# Patient Record
Sex: FEMALE | Race: WHITE | NOT HISPANIC OR LATINO | Employment: FULL TIME | URBAN - METROPOLITAN AREA
[De-identification: names, ages, dates, MRNs, and addresses within clinical notes are randomized per-mention and may not be internally consistent; named-entity substitution may affect disease eponyms.]

---

## 2023-04-01 DIAGNOSIS — B02.9 HERPES ZOSTER WITHOUT COMPLICATION: Primary | ICD-10-CM

## 2023-04-03 DIAGNOSIS — B02.9 HERPES ZOSTER WITHOUT COMPLICATION: ICD-10-CM

## 2023-04-03 RX ORDER — GABAPENTIN 100 MG/1
CAPSULE ORAL
Qty: 30 CAPSULE | Refills: 0 | Status: SHIPPED | OUTPATIENT
Start: 2023-04-03 | End: 2023-04-25 | Stop reason: ALTCHOICE

## 2023-04-03 RX ORDER — GABAPENTIN 100 MG/1
CAPSULE ORAL
Qty: 30 CAPSULE | Refills: 0 | Status: SHIPPED | OUTPATIENT
Start: 2023-04-03 | End: 2023-04-03

## 2023-04-22 ENCOUNTER — PATIENT MESSAGE (OUTPATIENT)
Dept: PRIMARY CARE | Facility: CLINIC | Age: 63
End: 2023-04-22
Payer: MEDICARE

## 2023-04-22 DIAGNOSIS — M43.10 SPONDYLOLISTHESIS, UNSPECIFIED SPINAL REGION: Primary | ICD-10-CM

## 2023-04-25 RX ORDER — GABAPENTIN 100 MG/1
200 CAPSULE ORAL NIGHTLY
Qty: 60 CAPSULE | Refills: 11 | Status: SHIPPED | OUTPATIENT
Start: 2023-04-25 | End: 2024-04-22

## 2023-05-31 PROBLEM — B02.9 SHINGLES: Status: ACTIVE | Noted: 2023-05-31

## 2023-05-31 PROBLEM — R51.9 HEADACHE: Status: ACTIVE | Noted: 2023-05-31

## 2023-05-31 PROBLEM — U07.1 COVID-19: Status: ACTIVE | Noted: 2023-05-31

## 2023-05-31 PROBLEM — H90.3 SENSORINEURAL HEARING LOSS, BILATERAL: Status: ACTIVE | Noted: 2023-05-31

## 2023-05-31 PROBLEM — R21 RASH: Status: ACTIVE | Noted: 2023-05-31

## 2023-05-31 PROBLEM — J01.90 ACUTE SINUSITIS: Status: ACTIVE | Noted: 2023-05-31

## 2023-05-31 PROBLEM — E78.5 HYPERLIPIDEMIA: Status: ACTIVE | Noted: 2023-05-31

## 2023-05-31 PROBLEM — I10 HYPERTENSION: Status: ACTIVE | Noted: 2023-05-31

## 2023-05-31 PROBLEM — E04.1 THYROID NODULE: Status: ACTIVE | Noted: 2023-05-31

## 2023-06-07 ASSESSMENT — PROMIS GLOBAL HEALTH SCALE
CARRYOUT_SOCIAL_ACTIVITIES: EXCELLENT
RATE_AVERAGE_PAIN: 1
CARRYOUT_PHYSICAL_ACTIVITIES: COMPLETELY
RATE_SOCIAL_SATISFACTION: EXCELLENT
EMOTIONAL_PROBLEMS: RARELY
RATE_MENTAL_HEALTH: VERY GOOD
RATE_GENERAL_HEALTH: VERY GOOD
RATE_QUALITY_OF_LIFE: EXCELLENT
RATE_PHYSICAL_HEALTH: VERY GOOD
RATE_AVERAGE_FATIGUE: MILD

## 2023-06-09 ENCOUNTER — OFFICE VISIT (OUTPATIENT)
Dept: PRIMARY CARE | Facility: CLINIC | Age: 63
End: 2023-06-09
Payer: MEDICARE

## 2023-06-09 VITALS
HEART RATE: 66 BPM | TEMPERATURE: 97.5 F | WEIGHT: 147 LBS | DIASTOLIC BLOOD PRESSURE: 70 MMHG | SYSTOLIC BLOOD PRESSURE: 136 MMHG | RESPIRATION RATE: 16 BRPM | OXYGEN SATURATION: 96 % | BODY MASS INDEX: 28.24 KG/M2

## 2023-06-09 DIAGNOSIS — E78.2 MIXED HYPERLIPIDEMIA: ICD-10-CM

## 2023-06-09 DIAGNOSIS — Z00.00 HEALTHCARE MAINTENANCE: Primary | ICD-10-CM

## 2023-06-09 PROBLEM — B02.9 SHINGLES: Status: RESOLVED | Noted: 2023-05-31 | Resolved: 2023-06-09

## 2023-06-09 PROBLEM — U07.1 COVID-19: Status: RESOLVED | Noted: 2023-05-31 | Resolved: 2023-06-09

## 2023-06-09 PROBLEM — R51.9 HEADACHE: Status: RESOLVED | Noted: 2023-05-31 | Resolved: 2023-06-09

## 2023-06-09 PROBLEM — J01.90 ACUTE SINUSITIS: Status: RESOLVED | Noted: 2023-05-31 | Resolved: 2023-06-09

## 2023-06-09 PROBLEM — R21 RASH: Status: RESOLVED | Noted: 2023-05-31 | Resolved: 2023-06-09

## 2023-06-09 PROCEDURE — 99397 PER PM REEVAL EST PAT 65+ YR: CPT | Performed by: INTERNAL MEDICINE

## 2023-06-09 PROCEDURE — 3078F DIAST BP <80 MM HG: CPT | Performed by: INTERNAL MEDICINE

## 2023-06-09 PROCEDURE — 93000 ELECTROCARDIOGRAM COMPLETE: CPT | Performed by: INTERNAL MEDICINE

## 2023-06-09 PROCEDURE — 1160F RVW MEDS BY RX/DR IN RCRD: CPT | Performed by: INTERNAL MEDICINE

## 2023-06-09 PROCEDURE — 1036F TOBACCO NON-USER: CPT | Performed by: INTERNAL MEDICINE

## 2023-06-09 PROCEDURE — 3075F SYST BP GE 130 - 139MM HG: CPT | Performed by: INTERNAL MEDICINE

## 2023-06-09 PROCEDURE — 1159F MED LIST DOCD IN RCRD: CPT | Performed by: INTERNAL MEDICINE

## 2023-06-09 RX ORDER — SIMVASTATIN 40 MG/1
40 TABLET, FILM COATED ORAL NIGHTLY
COMMUNITY
End: 2023-07-17

## 2023-06-09 RX ORDER — TRIAMTERENE AND HYDROCHLOROTHIAZIDE 37.5; 25 MG/1; MG/1
1 CAPSULE ORAL DAILY
COMMUNITY
End: 2023-12-08 | Stop reason: SDUPTHER

## 2023-06-09 RX ORDER — ALPRAZOLAM 0.25 MG/1
1 TABLET ORAL
COMMUNITY
Start: 2022-12-09

## 2023-06-09 ASSESSMENT — ENCOUNTER SYMPTOMS
SINUS PAIN: 1
DYSURIA: 0
CONSTIPATION: 0
SHORTNESS OF BREATH: 0
DIARRHEA: 0

## 2023-06-09 ASSESSMENT — PATIENT HEALTH QUESTIONNAIRE - PHQ9
SUM OF ALL RESPONSES TO PHQ9 QUESTIONS 1 AND 2: 0
2. FEELING DOWN, DEPRESSED OR HOPELESS: NOT AT ALL
1. LITTLE INTEREST OR PLEASURE IN DOING THINGS: NOT AT ALL

## 2023-06-09 NOTE — PROGRESS NOTES
Patient here for a physical     Subjective   Patient ID: Geri Christianson is a 63 y.o. female who presents for Annual Exam.    The patient reports a sinus headache on alternating sides, which she suspects is exacerbated by seasonal allergies.     The patient follows with  from Obstetrics/Gynecology for [facility].  She is up to date with her screening tests.    The patient requests a gluten panel. She denies any bowel problems.    The patient's mother recently suffered a myocardial infarction and her father had a previous history of CAD.  The patient's EKG was unremarkable compared   to previous studies.  She denies any dyspnea, chest pain, or chest pressure.    The patient denies any hearing impairment, vision changes, dysuria, urinary urgency, or other urinary symptoms.      Review of Systems   HENT:  Positive for sinus pain. Negative for hearing loss.    Eyes:  Negative for visual disturbance.   Respiratory:  Negative for shortness of breath.    Cardiovascular:  Negative for chest pain.   Gastrointestinal:  Negative for constipation and diarrhea.   Genitourinary:  Negative for dysuria and urgency.     Objective   Physical Exam  Constitutional:       Appearance: Normal appearance.   Cardiovascular:      Rate and Rhythm: Normal rate and regular rhythm.      Heart sounds: Normal heart sounds.   Pulmonary:      Effort: Pulmonary effort is normal.      Breath sounds: Normal breath sounds.   Abdominal:      General: Bowel sounds are normal.      Palpations: Abdomen is soft.      Tenderness: There is no abdominal tenderness.   Skin:     General: Skin is warm and dry.   Neurological:      General: No focal deficit present.      Mental Status: She is alert and oriented to person, place, and time. Mental status is at baseline.   Psychiatric:         Mood and Affect: Mood normal.         Behavior: Behavior normal.       Assessment/Plan   Problem List Items Addressed This Visit       Hyperlipidemia     Relevant Orders    CBC and Auto Differential    CT cardiac scoring wo IV contrast     Other Visit Diagnoses       Healthcare maintenance    -  Primary    Relevant Orders    CBC and Auto Differential    Comprehensive metabolic panel    Lipid panel    Tsh With Reflex To Free T4 If Abnormal    Celiac Panel            CPE Performed  -  Discussed healthy diet and regular exercise.    -  Physical exam overall unremarkable. Immunizations reviewed and updated accordingly. Healthy lifestyle choices discussed (tobacco avoidance, appropriate alcohol use, avoidance of illicit substances).   -  Patient is wearing seatbelt.   -  Screening lab work ordered as indicated.    -  Age appropriate screening tests reviewed with patient.        EKG unremarkable compared to previous.    IMPRESSIONS/PLAN :      Seasonal Allergies  - Advised the patient to try Xyzal as directed on the packaging over the counter.  Call the clinic if symptoms persist or worsen.    HLD   - Stable, continue on simvastatin 40mg QD.  Ordered CT Cardiac Score to establish cardiac risk given family history.    HTN   - /70 in office today, continue on triamterene-HCTZ 37.5-25mg QD.      Thyroid Nodule   - US thyroid 1/2021 showed stable, subcentimeter nodules of left gland. Stable per 11/2022 repeat.     Mammogram   - 11/25/2022 screening test found probably benign calcifications, and radiology recommended repeat in 6 months.  Benign per 6/2023 repeat.  Due in 11/2023 for bilateral screening.     Shingles   - Can continue to take Gabapentin 100 mg as two tablets at bedtime as needed.     Health Maintenance   - Routine labs ordered including CBC, CMP, and a lipid panel to be completed in the fasting state.  Added TSH, Celiac panel per patient request. Last colonoscopy performed 6/2021, due for repeat 2031.     Follow-up according to regular health maintenance, call sooner if needed.        Scribe Attestation  By signing my name below, I, Sadaf Barahona,  Scribe   attest that this documentation has been prepared under the direction and in the presence of Davon Salcedo DO.

## 2023-07-15 DIAGNOSIS — E78.2 MIXED HYPERLIPIDEMIA: Primary | ICD-10-CM

## 2023-07-17 RX ORDER — SIMVASTATIN 40 MG/1
TABLET, FILM COATED ORAL
Qty: 90 TABLET | Refills: 0 | Status: SHIPPED | OUTPATIENT
Start: 2023-07-17 | End: 2023-10-19

## 2023-08-11 ENCOUNTER — LAB (OUTPATIENT)
Dept: LAB | Facility: LAB | Age: 63
End: 2023-08-11
Payer: MEDICARE

## 2023-08-11 DIAGNOSIS — E78.2 MIXED HYPERLIPIDEMIA: ICD-10-CM

## 2023-08-11 DIAGNOSIS — Z00.00 HEALTHCARE MAINTENANCE: ICD-10-CM

## 2023-08-11 LAB
ALANINE AMINOTRANSFERASE (SGPT) (U/L) IN SER/PLAS: 12 U/L (ref 7–45)
ALBUMIN (G/DL) IN SER/PLAS: 3.9 G/DL (ref 3.4–5)
ALKALINE PHOSPHATASE (U/L) IN SER/PLAS: 71 U/L (ref 33–136)
ANION GAP IN SER/PLAS: 13 MMOL/L (ref 10–20)
ASPARTATE AMINOTRANSFERASE (SGOT) (U/L) IN SER/PLAS: 18 U/L (ref 9–39)
BASOPHILS (10*3/UL) IN BLOOD BY AUTOMATED COUNT: 0.04 X10E9/L (ref 0–0.1)
BASOPHILS/100 LEUKOCYTES IN BLOOD BY AUTOMATED COUNT: 0.7 % (ref 0–2)
BILIRUBIN TOTAL (MG/DL) IN SER/PLAS: 0.5 MG/DL (ref 0–1.2)
CALCIUM (MG/DL) IN SER/PLAS: 9.3 MG/DL (ref 8.6–10.3)
CARBON DIOXIDE, TOTAL (MMOL/L) IN SER/PLAS: 30 MMOL/L (ref 21–32)
CHLORIDE (MMOL/L) IN SER/PLAS: 102 MMOL/L (ref 98–107)
CHOLESTEROL (MG/DL) IN SER/PLAS: 145 MG/DL (ref 0–199)
CHOLESTEROL IN HDL (MG/DL) IN SER/PLAS: 44.1 MG/DL
CHOLESTEROL/HDL RATIO: 3.3
CREATININE (MG/DL) IN SER/PLAS: 0.84 MG/DL (ref 0.5–1.05)
DEAMIDATED GLIADIN PEPTIDE IGA: 3 U/ML (ref 0–14)
DEAMIDATED GLIADIN PEPTIDE IGG: <1 U/ML (ref 0–14)
EOSINOPHILS (10*3/UL) IN BLOOD BY AUTOMATED COUNT: 0.11 X10E9/L (ref 0–0.7)
EOSINOPHILS/100 LEUKOCYTES IN BLOOD BY AUTOMATED COUNT: 2 % (ref 0–6)
ERYTHROCYTE DISTRIBUTION WIDTH (RATIO) BY AUTOMATED COUNT: 13.3 % (ref 11.5–14.5)
ERYTHROCYTE MEAN CORPUSCULAR HEMOGLOBIN CONCENTRATION (G/DL) BY AUTOMATED: 33.2 G/DL (ref 32–36)
ERYTHROCYTE MEAN CORPUSCULAR VOLUME (FL) BY AUTOMATED COUNT: 92 FL (ref 80–100)
ERYTHROCYTES (10*6/UL) IN BLOOD BY AUTOMATED COUNT: 4.7 X10E12/L (ref 4–5.2)
GFR FEMALE: 77 ML/MIN/1.73M2
GLUCOSE (MG/DL) IN SER/PLAS: 84 MG/DL (ref 74–99)
HEMATOCRIT (%) IN BLOOD BY AUTOMATED COUNT: 43.4 % (ref 36–46)
HEMOGLOBIN (G/DL) IN BLOOD: 14.4 G/DL (ref 12–16)
IMMATURE GRANULOCYTES/100 LEUKOCYTES IN BLOOD BY AUTOMATED COUNT: 0.2 % (ref 0–0.9)
LDL: 86 MG/DL (ref 0–99)
LEUKOCYTES (10*3/UL) IN BLOOD BY AUTOMATED COUNT: 5.4 X10E9/L (ref 4.4–11.3)
LYMPHOCYTES (10*3/UL) IN BLOOD BY AUTOMATED COUNT: 2.09 X10E9/L (ref 1.2–4.8)
LYMPHOCYTES/100 LEUKOCYTES IN BLOOD BY AUTOMATED COUNT: 38.8 % (ref 13–44)
MONOCYTES (10*3/UL) IN BLOOD BY AUTOMATED COUNT: 0.47 X10E9/L (ref 0.1–1)
MONOCYTES/100 LEUKOCYTES IN BLOOD BY AUTOMATED COUNT: 8.7 % (ref 2–10)
NEUTROPHILS (10*3/UL) IN BLOOD BY AUTOMATED COUNT: 2.66 X10E9/L (ref 1.2–7.7)
NEUTROPHILS/100 LEUKOCYTES IN BLOOD BY AUTOMATED COUNT: 49.6 % (ref 40–80)
PLATELETS (10*3/UL) IN BLOOD AUTOMATED COUNT: 290 X10E9/L (ref 150–450)
POTASSIUM (MMOL/L) IN SER/PLAS: 3.9 MMOL/L (ref 3.5–5.3)
PROTEIN TOTAL: 6.6 G/DL (ref 6.4–8.2)
SODIUM (MMOL/L) IN SER/PLAS: 141 MMOL/L (ref 136–145)
THYROTROPIN (MIU/L) IN SER/PLAS BY DETECTION LIMIT <= 0.05 MIU/L: 1.02 MIU/L (ref 0.44–3.98)
TISSUE TRANSGLUTAMINASE IGG: <1 U/ML (ref 0–14)
TISSUE TRANSGLUTAMINASE, IGA: <1 U/ML (ref 0–14)
TRIGLYCERIDE (MG/DL) IN SER/PLAS: 75 MG/DL (ref 0–149)
UREA NITROGEN (MG/DL) IN SER/PLAS: 21 MG/DL (ref 6–23)
VLDL: 15 MG/DL (ref 0–40)

## 2023-08-11 PROCEDURE — 84443 ASSAY THYROID STIM HORMONE: CPT

## 2023-08-11 PROCEDURE — 85025 COMPLETE CBC W/AUTO DIFF WBC: CPT

## 2023-08-11 PROCEDURE — 80061 LIPID PANEL: CPT

## 2023-08-11 PROCEDURE — 36415 COLL VENOUS BLD VENIPUNCTURE: CPT

## 2023-08-11 PROCEDURE — 80053 COMPREHEN METABOLIC PANEL: CPT

## 2023-08-11 PROCEDURE — 83516 IMMUNOASSAY NONANTIBODY: CPT

## 2023-08-14 DIAGNOSIS — R93.1 ELEVATED CORONARY ARTERY CALCIUM SCORE: ICD-10-CM

## 2023-08-14 DIAGNOSIS — R93.89 ABNORMAL CT SCAN, CHEST: ICD-10-CM

## 2023-08-14 DIAGNOSIS — R59.9 ENLARGED LYMPH NODE: Primary | ICD-10-CM

## 2023-09-09 DIAGNOSIS — G43.909 MIGRAINE WITHOUT STATUS MIGRAINOSUS, NOT INTRACTABLE, UNSPECIFIED MIGRAINE TYPE: Primary | ICD-10-CM

## 2023-09-09 RX ORDER — SUMATRIPTAN SUCCINATE 100 MG/1
100 TABLET ORAL ONCE AS NEEDED
Qty: 69 TABLET | Refills: 0 | Status: SHIPPED | OUTPATIENT
Start: 2023-09-09 | End: 2023-12-08 | Stop reason: SINTOL

## 2023-09-15 ENCOUNTER — ANESTHESIA (OUTPATIENT)
Dept: INTERVENTIONAL RADIOLOGY/VASCULAR | Age: 63
End: 2023-09-15

## 2023-09-15 ENCOUNTER — HOSPITAL ENCOUNTER (INPATIENT)
Age: 63
LOS: 10 days | Discharge: HOME | DRG: 021 | End: 2023-09-25
Attending: EMERGENCY MEDICINE | Admitting: STUDENT IN AN ORGANIZED HEALTH CARE EDUCATION/TRAINING PROGRAM
Payer: MEDICARE

## 2023-09-15 ENCOUNTER — APPOINTMENT (OUTPATIENT)
Dept: INTERVENTIONAL RADIOLOGY/VASCULAR | Age: 63
DRG: 021 | End: 2023-09-15
Payer: MEDICARE

## 2023-09-15 ENCOUNTER — ANESTHESIA EVENT (OUTPATIENT)
Dept: INTERVENTIONAL RADIOLOGY/VASCULAR | Age: 63
End: 2023-09-15

## 2023-09-15 ENCOUNTER — APPOINTMENT (OUTPATIENT)
Dept: CT IMAGING | Age: 63
DRG: 021 | End: 2023-09-15
Payer: MEDICARE

## 2023-09-15 DIAGNOSIS — I60.01: ICD-10-CM

## 2023-09-15 DIAGNOSIS — G91.1 OBSTRUCTIVE HYDROCEPHALUS (HCC): ICD-10-CM

## 2023-09-15 DIAGNOSIS — I67.1 ANEURYSM OF INTRACRANIAL PORTION OF RIGHT INTERNAL CAROTID ARTERY: ICD-10-CM

## 2023-09-15 DIAGNOSIS — I60.9 SUBARACHNOID HEMORRHAGE (HCC): Primary | ICD-10-CM

## 2023-09-15 LAB
ABO + RH BLD: NORMAL
ACT BLD: 164 SEC (ref 79–149)
ALBUMIN SERPL-MCNC: 4 G/DL (ref 3.5–5.2)
ALBUMIN/GLOB SERPL: 1.3 {RATIO} (ref 1–2.5)
ALP SERPL-CCNC: 91 U/L (ref 35–104)
ALT SERPL-CCNC: 10 U/L (ref 5–33)
ANION GAP SERPL CALCULATED.3IONS-SCNC: 14 MMOL/L (ref 9–17)
ARM BAND NUMBER: NORMAL
AST SERPL-CCNC: 24 U/L
BASOPHILS # BLD: 0.03 K/UL (ref 0–0.2)
BASOPHILS NFR BLD: 0 % (ref 0–2)
BILIRUB SERPL-MCNC: 0.5 MG/DL (ref 0.3–1.2)
BLOOD BANK SAMPLE EXPIRATION: NORMAL
BLOOD GROUP ANTIBODIES SERPL: NEGATIVE
BUN SERPL-MCNC: 21 MG/DL (ref 8–23)
CALCIUM SERPL-MCNC: 8.7 MG/DL (ref 8.6–10.4)
CHLORIDE SERPL-SCNC: 99 MMOL/L (ref 98–107)
CO2 SERPL-SCNC: 22 MMOL/L (ref 20–31)
CREAT SERPL-MCNC: 0.6 MG/DL (ref 0.5–0.9)
EOSINOPHIL # BLD: <0.03 K/UL (ref 0–0.44)
EOSINOPHILS RELATIVE PERCENT: 0 % (ref 1–4)
ERYTHROCYTE [DISTWIDTH] IN BLOOD BY AUTOMATED COUNT: 13.7 % (ref 11.8–14.4)
GFR SERPL CREATININE-BSD FRML MDRD: >60 ML/MIN/1.73M2
GLUCOSE SERPL-MCNC: 139 MG/DL (ref 70–99)
HCT VFR BLD AUTO: 46 % (ref 36.3–47.1)
HGB BLD-MCNC: 15.2 G/DL (ref 11.9–15.1)
IMM GRANULOCYTES # BLD AUTO: 0.03 K/UL (ref 0–0.3)
IMM GRANULOCYTES NFR BLD: 0 %
INR PPP: 1
LYMPHOCYTES NFR BLD: 0.82 K/UL (ref 1.1–3.7)
LYMPHOCYTES RELATIVE PERCENT: 8 % (ref 24–43)
MCH RBC QN AUTO: 30.3 PG (ref 25.2–33.5)
MCHC RBC AUTO-ENTMCNC: 33 G/DL (ref 28.4–34.8)
MCV RBC AUTO: 91.6 FL (ref 82.6–102.9)
MONOCYTES NFR BLD: 0.17 K/UL (ref 0.1–1.2)
MONOCYTES NFR BLD: 2 % (ref 3–12)
NEUTROPHILS NFR BLD: 90 % (ref 36–65)
NEUTS SEG NFR BLD: 8.86 K/UL (ref 1.5–8.1)
NRBC BLD-RTO: 0 PER 100 WBC
PARTIAL THROMBOPLASTIN TIME: 24.5 SEC (ref 23–36.5)
PLATELET # BLD AUTO: 273 K/UL (ref 138–453)
PMV BLD AUTO: 11 FL (ref 8.1–13.5)
POTASSIUM SERPL-SCNC: 4.1 MMOL/L (ref 3.7–5.3)
PROT SERPL-MCNC: 7 G/DL (ref 6.4–8.3)
PROTHROMBIN TIME: 12.4 SEC (ref 11.7–14.9)
RBC # BLD AUTO: 5.02 M/UL (ref 3.95–5.11)
SODIUM SERPL-SCNC: 135 MMOL/L (ref 135–144)
WBC OTHER # BLD: 9.9 K/UL (ref 3.5–11.3)

## 2023-09-15 PROCEDURE — B31R1ZZ FLUOROSCOPY OF INTRACRANIAL ARTERIES USING LOW OSMOLAR CONTRAST: ICD-10-PCS | Performed by: PSYCHIATRY & NEUROLOGY

## 2023-09-15 PROCEDURE — 36224 PLACE CATH CAROTD ART: CPT

## 2023-09-15 PROCEDURE — 80053 COMPREHEN METABOLIC PANEL: CPT

## 2023-09-15 PROCEDURE — 75898 FOLLOW-UP ANGIOGRAPHY: CPT

## 2023-09-15 PROCEDURE — 36224 PLACE CATH CAROTD ART: CPT | Performed by: PSYCHIATRY & NEUROLOGY

## 2023-09-15 PROCEDURE — 75894 X-RAYS TRANSCATH THERAPY: CPT

## 2023-09-15 PROCEDURE — C1894 INTRO/SHEATH, NON-LASER: HCPCS

## 2023-09-15 PROCEDURE — 6360000002 HC RX W HCPCS: Performed by: STUDENT IN AN ORGANIZED HEALTH CARE EDUCATION/TRAINING PROGRAM

## 2023-09-15 PROCEDURE — 2500000003 HC RX 250 WO HCPCS: Performed by: SPECIALIST

## 2023-09-15 PROCEDURE — 6360000004 HC RX CONTRAST MEDICATION: Performed by: PSYCHIATRY & NEUROLOGY

## 2023-09-15 PROCEDURE — 86901 BLOOD TYPING SEROLOGIC RH(D): CPT

## 2023-09-15 PROCEDURE — 3700000001 HC ADD 15 MINUTES (ANESTHESIA)

## 2023-09-15 PROCEDURE — 96375 TX/PRO/DX INJ NEW DRUG ADDON: CPT

## 2023-09-15 PROCEDURE — 6370000000 HC RX 637 (ALT 250 FOR IP): Performed by: PSYCHIATRY & NEUROLOGY

## 2023-09-15 PROCEDURE — 6360000002 HC RX W HCPCS: Performed by: SPECIALIST

## 2023-09-15 PROCEDURE — 03VG3HZ RESTRICTION OF INTRACRANIAL ARTERY WITH INTRALUMINAL DEVICE, FLOW DIVERTER, PERCUTANEOUS APPROACH: ICD-10-PCS | Performed by: PSYCHIATRY & NEUROLOGY

## 2023-09-15 PROCEDURE — 3700000000 HC ANESTHESIA ATTENDED CARE

## 2023-09-15 PROCEDURE — 76377 3D RENDER W/INTRP POSTPROCES: CPT | Performed by: PSYCHIATRY & NEUROLOGY

## 2023-09-15 PROCEDURE — 36226 PLACE CATH VERTEBRAL ART: CPT

## 2023-09-15 PROCEDURE — 85730 THROMBOPLASTIN TIME PARTIAL: CPT

## 2023-09-15 PROCEDURE — 75898 FOLLOW-UP ANGIOGRAPHY: CPT | Performed by: PSYCHIATRY & NEUROLOGY

## 2023-09-15 PROCEDURE — 85610 PROTHROMBIN TIME: CPT

## 2023-09-15 PROCEDURE — 86850 RBC ANTIBODY SCREEN: CPT

## 2023-09-15 PROCEDURE — 70450 CT HEAD/BRAIN W/O DYE: CPT

## 2023-09-15 PROCEDURE — 99223 1ST HOSP IP/OBS HIGH 75: CPT | Performed by: PSYCHIATRY & NEUROLOGY

## 2023-09-15 PROCEDURE — 99285 EMERGENCY DEPT VISIT HI MDM: CPT

## 2023-09-15 PROCEDURE — 85347 COAGULATION TIME ACTIVATED: CPT

## 2023-09-15 PROCEDURE — 36226 PLACE CATH VERTEBRAL ART: CPT | Performed by: PSYCHIATRY & NEUROLOGY

## 2023-09-15 PROCEDURE — 75894 X-RAYS TRANSCATH THERAPY: CPT | Performed by: PSYCHIATRY & NEUROLOGY

## 2023-09-15 PROCEDURE — 61624 TCAT PERM OCCLS/EMBOLJ CNS: CPT | Performed by: PSYCHIATRY & NEUROLOGY

## 2023-09-15 PROCEDURE — 96374 THER/PROPH/DIAG INJ IV PUSH: CPT

## 2023-09-15 PROCEDURE — 86900 BLOOD TYPING SEROLOGIC ABO: CPT

## 2023-09-15 PROCEDURE — 2000000000 HC ICU R&B

## 2023-09-15 PROCEDURE — 93005 ELECTROCARDIOGRAM TRACING: CPT | Performed by: STUDENT IN AN ORGANIZED HEALTH CARE EDUCATION/TRAINING PROGRAM

## 2023-09-15 PROCEDURE — 70496 CT ANGIOGRAPHY HEAD: CPT

## 2023-09-15 PROCEDURE — 85025 COMPLETE CBC W/AUTO DIFF WBC: CPT

## 2023-09-15 PROCEDURE — 61624 TCAT PERM OCCLS/EMBOLJ CNS: CPT

## 2023-09-15 PROCEDURE — 2580000003 HC RX 258: Performed by: SPECIALIST

## 2023-09-15 RX ORDER — CLOPIDOGREL 300 MG/1
600 TABLET, FILM COATED ORAL ONCE
Status: COMPLETED | OUTPATIENT
Start: 2023-09-15 | End: 2023-09-15

## 2023-09-15 RX ORDER — PROPOFOL 10 MG/ML
INJECTION, EMULSION INTRAVENOUS PRN
Status: DISCONTINUED | OUTPATIENT
Start: 2023-09-15 | End: 2023-09-16 | Stop reason: SDUPTHER

## 2023-09-15 RX ORDER — HEPARIN SODIUM 1000 [USP'U]/ML
INJECTION, SOLUTION INTRAVENOUS; SUBCUTANEOUS PRN
Status: DISCONTINUED | OUTPATIENT
Start: 2023-09-15 | End: 2023-09-16 | Stop reason: SDUPTHER

## 2023-09-15 RX ORDER — SIMVASTATIN 40 MG
40 TABLET ORAL NIGHTLY
COMMUNITY

## 2023-09-15 RX ORDER — EPHEDRINE SULFATE 50 MG/ML
INJECTION, SOLUTION INTRAVENOUS PRN
Status: DISCONTINUED | OUTPATIENT
Start: 2023-09-15 | End: 2023-09-16 | Stop reason: SDUPTHER

## 2023-09-15 RX ORDER — LIDOCAINE HYDROCHLORIDE 10 MG/ML
INJECTION, SOLUTION EPIDURAL; INFILTRATION; INTRACAUDAL; PERINEURAL PRN
Status: DISCONTINUED | OUTPATIENT
Start: 2023-09-15 | End: 2023-09-16 | Stop reason: SDUPTHER

## 2023-09-15 RX ORDER — DEXAMETHASONE SODIUM PHOSPHATE 10 MG/ML
INJECTION, SOLUTION INTRAMUSCULAR; INTRAVENOUS PRN
Status: DISCONTINUED | OUTPATIENT
Start: 2023-09-15 | End: 2023-09-16 | Stop reason: SDUPTHER

## 2023-09-15 RX ORDER — PROCHLORPERAZINE EDISYLATE 5 MG/ML
5 INJECTION INTRAMUSCULAR; INTRAVENOUS ONCE
Status: COMPLETED | OUTPATIENT
Start: 2023-09-15 | End: 2023-09-15

## 2023-09-15 RX ORDER — ASPIRIN 325 MG
325 TABLET ORAL ONCE
Status: COMPLETED | OUTPATIENT
Start: 2023-09-15 | End: 2023-09-15

## 2023-09-15 RX ORDER — FENTANYL CITRATE 50 UG/ML
INJECTION, SOLUTION INTRAMUSCULAR; INTRAVENOUS PRN
Status: DISCONTINUED | OUTPATIENT
Start: 2023-09-15 | End: 2023-09-16 | Stop reason: SDUPTHER

## 2023-09-15 RX ORDER — LEVETIRACETAM 10 MG/ML
INJECTION INTRAVASCULAR PRN
Status: DISCONTINUED | OUTPATIENT
Start: 2023-09-15 | End: 2023-09-16 | Stop reason: SDUPTHER

## 2023-09-15 RX ORDER — PHENYLEPHRINE HCL IN 0.9% NACL 1 MG/10 ML
SYRINGE (ML) INTRAVENOUS PRN
Status: DISCONTINUED | OUTPATIENT
Start: 2023-09-15 | End: 2023-09-16 | Stop reason: SDUPTHER

## 2023-09-15 RX ORDER — DIPHENHYDRAMINE HYDROCHLORIDE 50 MG/ML
12.5 INJECTION INTRAMUSCULAR; INTRAVENOUS ONCE
Status: COMPLETED | OUTPATIENT
Start: 2023-09-15 | End: 2023-09-15

## 2023-09-15 RX ORDER — TRIAMTERENE AND HYDROCHLOROTHIAZIDE 37.5; 25 MG/1; MG/1
1 CAPSULE ORAL EVERY MORNING
COMMUNITY

## 2023-09-15 RX ORDER — GABAPENTIN 100 MG/1
200 CAPSULE ORAL NIGHTLY
COMMUNITY

## 2023-09-15 RX ORDER — SUMATRIPTAN 100 MG/1
100 TABLET, FILM COATED ORAL PRN
Status: ON HOLD | COMMUNITY
End: 2023-09-25 | Stop reason: HOSPADM

## 2023-09-15 RX ORDER — ROCURONIUM BROMIDE 10 MG/ML
INJECTION, SOLUTION INTRAVENOUS PRN
Status: DISCONTINUED | OUTPATIENT
Start: 2023-09-15 | End: 2023-09-16 | Stop reason: SDUPTHER

## 2023-09-15 RX ORDER — ONDANSETRON 2 MG/ML
INJECTION INTRAMUSCULAR; INTRAVENOUS PRN
Status: DISCONTINUED | OUTPATIENT
Start: 2023-09-15 | End: 2023-09-16 | Stop reason: SDUPTHER

## 2023-09-15 RX ORDER — LEVETIRACETAM 10 MG/ML
INJECTION INTRAVASCULAR
Status: COMPLETED
Start: 2023-09-15 | End: 2023-09-15

## 2023-09-15 RX ORDER — CEFAZOLIN SODIUM 1 G/3ML
INJECTION, POWDER, FOR SOLUTION INTRAMUSCULAR; INTRAVENOUS PRN
Status: DISCONTINUED | OUTPATIENT
Start: 2023-09-15 | End: 2023-09-16 | Stop reason: SDUPTHER

## 2023-09-15 RX ORDER — SODIUM CHLORIDE, SODIUM LACTATE, POTASSIUM CHLORIDE, CALCIUM CHLORIDE 600; 310; 30; 20 MG/100ML; MG/100ML; MG/100ML; MG/100ML
INJECTION, SOLUTION INTRAVENOUS CONTINUOUS PRN
Status: DISCONTINUED | OUTPATIENT
Start: 2023-09-15 | End: 2023-09-16 | Stop reason: SDUPTHER

## 2023-09-15 RX ADMIN — PROPOFOL 150 MG: 10 INJECTION, EMULSION INTRAVENOUS at 20:26

## 2023-09-15 RX ADMIN — HEPARIN SODIUM 2000 UNITS: 1000 INJECTION INTRAVENOUS; SUBCUTANEOUS at 23:46

## 2023-09-15 RX ADMIN — CEFAZOLIN 2 G: 1 INJECTION, POWDER, FOR SOLUTION INTRAMUSCULAR; INTRAVENOUS at 20:28

## 2023-09-15 RX ADMIN — Medication 200 MCG: at 21:09

## 2023-09-15 RX ADMIN — PROCHLORPERAZINE EDISYLATE 5 MG: 5 INJECTION INTRAMUSCULAR; INTRAVENOUS at 18:39

## 2023-09-15 RX ADMIN — ROCURONIUM BROMIDE 50 MG: 10 INJECTION, SOLUTION INTRAVENOUS at 20:26

## 2023-09-15 RX ADMIN — DIPHENHYDRAMINE HYDROCHLORIDE 12.5 MG: 50 INJECTION INTRAMUSCULAR; INTRAVENOUS at 18:39

## 2023-09-15 RX ADMIN — ASPIRIN 325 MG: 325 TABLET ORAL at 22:30

## 2023-09-15 RX ADMIN — Medication 100 MCG: at 22:22

## 2023-09-15 RX ADMIN — PHENYLEPHRINE HYDROCHLORIDE 25 MCG/MIN: 10 INJECTION INTRAVENOUS at 22:37

## 2023-09-15 RX ADMIN — EPHEDRINE SULFATE 10 MG: 50 INJECTION, SOLUTION INTRAVENOUS at 22:06

## 2023-09-15 RX ADMIN — Medication 100 MCG: at 21:01

## 2023-09-15 RX ADMIN — ONDANSETRON 4 MG: 2 INJECTION INTRAMUSCULAR; INTRAVENOUS at 23:55

## 2023-09-15 RX ADMIN — SODIUM CHLORIDE, POTASSIUM CHLORIDE, SODIUM LACTATE AND CALCIUM CHLORIDE: 600; 310; 30; 20 INJECTION, SOLUTION INTRAVENOUS at 20:17

## 2023-09-15 RX ADMIN — Medication 50 MCG: at 20:54

## 2023-09-15 RX ADMIN — EPHEDRINE SULFATE 10 MG: 50 INJECTION, SOLUTION INTRAVENOUS at 21:21

## 2023-09-15 RX ADMIN — LIDOCAINE HYDROCHLORIDE 50 MG: 10 INJECTION, SOLUTION EPIDURAL; INFILTRATION; INTRACAUDAL; PERINEURAL at 20:26

## 2023-09-15 RX ADMIN — LEVETIRACETAM 1000 MG: 10 INJECTION, SOLUTION INTRAVENOUS at 21:25

## 2023-09-15 RX ADMIN — FENTANYL CITRATE 100 MCG: 50 INJECTION, SOLUTION INTRAMUSCULAR; INTRAVENOUS at 20:26

## 2023-09-15 RX ADMIN — SODIUM CHLORIDE, POTASSIUM CHLORIDE, SODIUM LACTATE AND CALCIUM CHLORIDE: 600; 310; 30; 20 INJECTION, SOLUTION INTRAVENOUS at 22:55

## 2023-09-15 RX ADMIN — EPHEDRINE SULFATE 10 MG: 50 INJECTION, SOLUTION INTRAVENOUS at 21:13

## 2023-09-15 RX ADMIN — CLOPIDOGREL BISULFATE 600 MG: 75 TABLET ORAL at 23:39

## 2023-09-15 RX ADMIN — Medication 50 MCG: at 20:57

## 2023-09-15 RX ADMIN — SODIUM CHLORIDE, POTASSIUM CHLORIDE, SODIUM LACTATE AND CALCIUM CHLORIDE: 600; 310; 30; 20 INJECTION, SOLUTION INTRAVENOUS at 21:34

## 2023-09-15 RX ADMIN — Medication 100 MCG: at 22:11

## 2023-09-15 RX ADMIN — Medication 100 MCG: at 22:30

## 2023-09-15 RX ADMIN — EPHEDRINE SULFATE 10 MG: 50 INJECTION, SOLUTION INTRAVENOUS at 21:04

## 2023-09-15 RX ADMIN — DEXAMETHASONE SODIUM PHOSPHATE 10 MG: 10 INJECTION, SOLUTION INTRAMUSCULAR; INTRAVENOUS at 20:53

## 2023-09-15 RX ADMIN — Medication 100 MCG: at 21:51

## 2023-09-15 RX ADMIN — Medication 100 MCG: at 22:03

## 2023-09-15 RX ADMIN — IOPAMIDOL 90 ML: 755 INJECTION, SOLUTION INTRAVENOUS at 19:10

## 2023-09-15 RX ADMIN — ROCURONIUM BROMIDE 20 MG: 10 INJECTION, SOLUTION INTRAVENOUS at 22:11

## 2023-09-15 NOTE — H&P
le - no drift; leg holds 30 degree position for full 5 seconds  6b. Motor right le - no drift; leg holds 30 degree position for full 5 seconds  7. Limb Ataxia:  0 - absent  8. Sensory:  0 - normal; no sensory loss  9. Best Language:  0 - no aphasia, normal  10. Dysarthria:  0 - normal  11. Extinction and Inattention:  0 - no abnormality  TOTAL: 0    LABS AND IMAGING:     RECENT LABS:  CBC with Differential:    Lab Results   Component Value Date/Time    WBC 9.9 09/15/2023 06:41 PM    RBC 5.02 09/15/2023 06:41 PM    HGB 15.2 09/15/2023 06:41 PM    HCT 46.0 09/15/2023 06:41 PM     09/15/2023 06:41 PM    MCV 91.6 09/15/2023 06:41 PM    MCH 30.3 09/15/2023 06:41 PM    MCHC 33.0 09/15/2023 06:41 PM    RDW 13.7 09/15/2023 06:41 PM    LYMPHOPCT 8 09/15/2023 06:41 PM    MONOPCT 2 09/15/2023 06:41 PM    BASOPCT 0 09/15/2023 06:41 PM    MONOSABS 0.17 09/15/2023 06:41 PM    LYMPHSABS 0.82 09/15/2023 06:41 PM    EOSABS <0.03 09/15/2023 06:41 PM    BASOSABS 0.03 09/15/2023 06:41 PM     BMP:    Lab Results   Component Value Date/Time     09/15/2023 06:41 PM    K 4.1 09/15/2023 06:41 PM    CL 99 09/15/2023 06:41 PM    CO2 22 09/15/2023 06:41 PM    BUN 21 09/15/2023 06:41 PM    LABALBU 4.0 09/15/2023 06:41 PM    CREATININE 0.6 09/15/2023 06:41 PM    CALCIUM 8.7 09/15/2023 06:41 PM    LABGLOM >60 09/15/2023 06:41 PM    GLUCOSE 139 09/15/2023 06:41 PM       RADIOLOGY:   CT HEAD WO CONTRAST   Final Result   Subarachnoid hemorrhage, right greater than left      RECOMMENDATIONS:   The findings were sent to the Radiology Results [email or web address] at 7:45   pm on 9/15/2023 to be communicated to a licensed caregiver. CTA HEAD NECK W CONTRAST   Final Result   3-4 mm aneurysm right supraclinoid ICA. Subarachnoid hemorrhage, right   greater than left. RECOMMENDATIONS:   Discussed with Dr. Erlinda Nick at 7:52 p.m.          IR ANGIOGRAM CAROTID C EREBRAL BILATERAL    (Results Pending)

## 2023-09-15 NOTE — CONSULTS
Endovascular Neurosurgery Consult      Reason for evaluation: SAH    SUBJECTIVE:   History of Chief Complaint:      70year old woman with HLD, long hx of migraine, 1 week ago c/o bad headache and thought it was migraine, headache subsided. Today after 1pm, she c/o sudden onset worse headache in her life. Went to [facility] ED, CT head showed right hemisphere [facility]. Patient was transferred here for higher level of care. CT head showed diffuse right side SAH and CTA showed a right ICA supraclinoid aneurysm. Allergies  is allergic to epinephrine. Medications  Prior to Admission medications    Medication Sig Start Date End Date Taking? Authorizing Provider   gabapentin (NEURONTIN) 100 MG capsule Take 2 capsules by mouth at bedtime. Yes Historical Provider, MD   simvastatin (ZOCOR) 40 MG tablet Take 1 tablet by mouth nightly   Yes Historical Provider, MD   triamterene-hydroCHLOROthiazide (DYAZIDE) 37.5-25 MG per capsule Take 1 capsule by mouth every morning   Yes Historical Provider, MD   SUMAtriptan (IMITREX) 100 MG tablet Take 1 tablet by mouth as needed for Migraine   Yes Historical Provider, MD    Scheduled Meds:   levETIRAcetam in NaCl        niCARdipine in sodium chloride         Continuous Infusions:  PRN Meds:.levETIRAcetam in NaCl, niCARdipine in sodium chloride  Past Medical History   has a past medical history of Hyperlipidemia, Hypertension, and Migraines. Past Surgical History   has no past surgical history on file. Social History   reports that she has never smoked. She has never used smokeless tobacco.   reports that she does not currently use alcohol. reports no history of drug use. Family History  family history includes Heart Attack in her father and mother; Heart Disease in her father and mother.     Review of Systems:  CONSTITUTIONAL:  negative for fevers, chills, fatigue and malaise    EYES:  negative for double vision, blurred vision and photophobia     HEENT:  negative for
CREATININE 0.6 09/15/2023 06:41 PM    CALCIUM 8.7 09/15/2023 06:41 PM    LABGLOM >60 09/15/2023 06:41 PM    GLUCOSE 139 09/15/2023 06:41 PM       Radiology Review:    CT HEAD WO CONTRAST   Final Result   Subarachnoid hemorrhage, right greater than left      RECOMMENDATIONS:   The findings were sent to the [facility] [address] at 7:45   pm on 9/15/2023 to be communicated to a licensed caregiver. CTA HEAD NECK W CONTRAST   Final Result   3-4 mm aneurysm right supraclinoid ICA. Subarachnoid hemorrhage, right   greater than left. RECOMMENDATIONS:   Discussed with Dr. Andreina Land at 7:52 p.m. IR ANGIOGRAM CAROTID C EREBRAL BILATERAL    (Results Pending)          ASSESSMENT AND PLAN:       Patient Active Problem List   Diagnosis    Subarachnoid hemorrhage (HCC)         A/P:  This is a 70 y.o. female with subarachnoid hemorrhage, right supraclinoid ICA aneurysm  Patient care will be discussed with attending, will reevaluated patient along with attending.     -Initial plan was for placement of EVD following DSA however due to need for pipeline embolization and subsequent antiplatelet administration will hold off on EVD placement and follow patient clinically.  - HOB: 30 degrees   - Obtain CT head in AM   - Neuro checks per protocol  - Hold all antiplatelets and anticoagulants  - We recommend SBP < 140      Additional recommendations may follow    Please contact neurosurgery with any changes in patients neurologic status. Thank you for your consult.        Bev Alford, APRN - [facility]   9/15/2023  7:57 PM

## 2023-09-15 NOTE — ED NOTES
Both Neuro surgery and Neuro Critical care residents at bedside   is also at [place], RN  09/15/23 9721

## 2023-09-15 NOTE — ED NOTES
More Both  [birth year]  Subarachnoid hemorrhage  Labs okay  Dr azevedo touch base with neurointerventional    Accepted by Dr. Aide Fu, RN  09/15/23 6063 [facility]

## 2023-09-15 NOTE — ED PROVIDER NOTES
[address] ED  Emergency Department Encounter  Emergency Medicine Resident     Pt Name:Charity Altamirano  MRN: [id number]  [facility] 1960  Date of evaluation: 9/15/23  PCP:  No primary care provider on file. Note Started: 6:39 PM EDT      CHIEF COMPLAINT       Chief Complaint   Patient presents with    Headache       HISTORY OF PRESENT ILLNESS  (Location/Symptom, Timing/Onset, Context/Setting, Quality, Duration, Modifying Factors, Severity.)      Rakesh Valentin is a 70 y.o. female who presents with subarachnoid hemorrhage, transfer from [facility]. Patient states that she had a headache that was acute onset today. Patient was seen in the emergency department, she does not have a history of any aneurysms. Patient does have a history of hypertension hyperlipidemia and migraines. She is not on any blood thinners. Patient is photophobic at bedside, she is not complaining of any chest pain or shortness of breath no fevers or chills no vomiting, she does have some nausea. PAST MEDICAL / SURGICAL / SOCIAL / FAMILY HISTORY      has a past medical history of Hyperlipidemia, Hypertension, and Migraines. has no past surgical history on file.       Social History     Socioeconomic History    Marital status: Not on file     Spouse name: Not on file    Number of children: Not on file    Years of education: Not on file    Highest education level: Not on file   Occupational History    Not on file   Tobacco Use    Smoking status: Never    Smokeless tobacco: Never   Substance and Sexual Activity    Alcohol use: Not Currently    Drug use: Never    Sexual activity: Not Currently     Partners: Male   Other Topics Concern    Not on file   Social History Narrative    Not on file     Social Determinants of Health     Financial Resource Strain: Not on file   Food Insecurity: Not on file   Transportation Needs: Not on file   Physical Activity: Not on file   Stress: Not on file

## 2023-09-15 NOTE — ED TRIAGE NOTES
Patient transfer from [facility] was life flighted  here   100 mcg, fentanyl and 4 mg IV Zofran given while in transport   Vitals reported /44 Hr 86, SP 02 96% RA    Transferred for [facility] -spontaneous, without cerebral edema     She arrived to [facility] for C? O headache was driving on [place] [address], headache came on suddenly, reports no blurred vision today; pulled over took Imitrex, and called 911   but last Friday did have blurred vision to left eye with a halo while in Pilates

## 2023-09-15 NOTE — ED NOTES
The following labs were labeled with appropriate pt sticker and tubed to lab:     [x] Blue     [x] Lavender   [] on ice  [x] Green/yellow  [x] Green/black [] on ice  [] Roxana Rincon  [] on ice  [] Yellow  [] Red  [x] Pink  [x] Type/ Screen  [] ABG  [] VBG    [] COVID-19 swab    [] Rapid  [] PCR  [] Flu swab  [] Peds Viral Panel     [] Urine Sample  [] Fecal Sample  [] Pelvic Cultures  [] Blood Cultures  [] X 2  [] STREP Cultures       Elizabeth Khanna RN  09/15/23 3644

## 2023-09-16 ENCOUNTER — APPOINTMENT (OUTPATIENT)
Dept: CT IMAGING | Age: 63
DRG: 021 | End: 2023-09-16
Payer: MEDICARE

## 2023-09-16 PROBLEM — I60.01: Status: ACTIVE | Noted: 2023-09-16

## 2023-09-16 LAB
ALBUMIN SERPL-MCNC: 3.3 G/DL (ref 3.5–5.2)
ALBUMIN/GLOB SERPL: 1.6 {RATIO} (ref 1–2.5)
ALP SERPL-CCNC: 69 U/L (ref 35–104)
ALT SERPL-CCNC: 6 U/L (ref 5–33)
ANION GAP SERPL CALCULATED.3IONS-SCNC: 10 MMOL/L (ref 9–17)
AST SERPL-CCNC: 10 U/L
BASOPHILS # BLD: <0.03 K/UL (ref 0–0.2)
BASOPHILS NFR BLD: 0 % (ref 0–2)
BILIRUB SERPL-MCNC: 0.2 MG/DL (ref 0.3–1.2)
BILIRUB UR QL STRIP: NEGATIVE
BUN SERPL-MCNC: 13 MG/DL (ref 8–23)
CALCIUM SERPL-MCNC: 7.9 MG/DL (ref 8.6–10.4)
CHLORIDE SERPL-SCNC: 109 MMOL/L (ref 98–107)
CHOLEST SERPL-MCNC: 131 MG/DL
CHOLESTEROL/HDL RATIO: 2.8
CLARITY UR: CLEAR
CO2 SERPL-SCNC: 23 MMOL/L (ref 20–31)
COLOR UR: YELLOW
COMMENT: NORMAL
CREAT SERPL-MCNC: 0.6 MG/DL (ref 0.5–0.9)
EKG ATRIAL RATE: 76 BPM
EKG P AXIS: 47 DEGREES
EKG P-R INTERVAL: 146 MS
EKG Q-T INTERVAL: 454 MS
EKG QRS DURATION: 86 MS
EKG QTC CALCULATION (BAZETT): 510 MS
EKG R AXIS: 60 DEGREES
EKG T AXIS: 85 DEGREES
EKG VENTRICULAR RATE: 76 BPM
EOSINOPHIL # BLD: <0.03 K/UL (ref 0–0.44)
EOSINOPHILS RELATIVE PERCENT: 0 % (ref 1–4)
ERYTHROCYTE [DISTWIDTH] IN BLOOD BY AUTOMATED COUNT: 13.6 % (ref 11.8–14.4)
GFR SERPL CREATININE-BSD FRML MDRD: >60 ML/MIN/1.73M2
GLUCOSE SERPL-MCNC: 179 MG/DL (ref 70–99)
GLUCOSE UR STRIP-MCNC: NEGATIVE MG/DL
HCT VFR BLD AUTO: 37.8 % (ref 36.3–47.1)
HDLC SERPL-MCNC: 46 MG/DL
HGB BLD-MCNC: 12.6 G/DL (ref 11.9–15.1)
HGB UR QL STRIP.AUTO: NEGATIVE
IMM GRANULOCYTES # BLD AUTO: 0.06 K/UL (ref 0–0.3)
IMM GRANULOCYTES NFR BLD: 1 %
KETONES UR STRIP-MCNC: NEGATIVE MG/DL
LDLC SERPL CALC-MCNC: 72 MG/DL (ref 0–130)
LEUKOCYTE ESTERASE UR QL STRIP: NEGATIVE
LYMPHOCYTES NFR BLD: 0.97 K/UL (ref 1.1–3.7)
LYMPHOCYTES RELATIVE PERCENT: 9 % (ref 24–43)
MAGNESIUM SERPL-MCNC: 1.8 MG/DL (ref 1.6–2.6)
MCH RBC QN AUTO: 30.9 PG (ref 25.2–33.5)
MCHC RBC AUTO-ENTMCNC: 33.3 G/DL (ref 28.4–34.8)
MCV RBC AUTO: 92.6 FL (ref 82.6–102.9)
MONOCYTES NFR BLD: 0.22 K/UL (ref 0.1–1.2)
MONOCYTES NFR BLD: 2 % (ref 3–12)
NEUTROPHILS NFR BLD: 88 % (ref 36–65)
NEUTS SEG NFR BLD: 9.32 K/UL (ref 1.5–8.1)
NITRITE UR QL STRIP: NEGATIVE
NRBC BLD-RTO: 0 PER 100 WBC
PH UR STRIP: 6.5 [PH] (ref 5–8)
PLATELET # BLD AUTO: 251 K/UL (ref 138–453)
PMV BLD AUTO: 10.6 FL (ref 8.1–13.5)
POTASSIUM SERPL-SCNC: 3.3 MMOL/L (ref 3.7–5.3)
PROT SERPL-MCNC: 5.4 G/DL (ref 6.4–8.3)
PROT UR STRIP-MCNC: NEGATIVE MG/DL
RBC # BLD AUTO: 4.08 M/UL (ref 3.95–5.11)
SODIUM SERPL-SCNC: 142 MMOL/L (ref 135–144)
SP GR UR STRIP: 1.01 (ref 1–1.03)
TRIGL SERPL-MCNC: 65 MG/DL
TROPONIN I SERPL HS-MCNC: 12 NG/L (ref 0–14)
UROBILINOGEN UR STRIP-ACNC: NORMAL EU/DL (ref 0–1)
WBC OTHER # BLD: 10.6 K/UL (ref 3.5–11.3)

## 2023-09-16 PROCEDURE — 92523 SPEECH SOUND LANG COMPREHEN: CPT

## 2023-09-16 PROCEDURE — 85025 COMPLETE CBC W/AUTO DIFF WBC: CPT

## 2023-09-16 PROCEDURE — 2580000003 HC RX 258: Performed by: PSYCHIATRY & NEUROLOGY

## 2023-09-16 PROCEDURE — 2500000003 HC RX 250 WO HCPCS: Performed by: PSYCHIATRY & NEUROLOGY

## 2023-09-16 PROCEDURE — 84484 ASSAY OF TROPONIN QUANT: CPT

## 2023-09-16 PROCEDURE — 2580000003 HC RX 258

## 2023-09-16 PROCEDURE — 6360000002 HC RX W HCPCS

## 2023-09-16 PROCEDURE — 6360000002 HC RX W HCPCS: Performed by: SPECIALIST

## 2023-09-16 PROCEDURE — 6370000000 HC RX 637 (ALT 250 FOR IP)

## 2023-09-16 PROCEDURE — 99233 SBSQ HOSP IP/OBS HIGH 50: CPT | Performed by: PSYCHIATRY & NEUROLOGY

## 2023-09-16 PROCEDURE — 80061 LIPID PANEL: CPT

## 2023-09-16 PROCEDURE — 83036 HEMOGLOBIN GLYCOSYLATED A1C: CPT

## 2023-09-16 PROCEDURE — 80053 COMPREHEN METABOLIC PANEL: CPT

## 2023-09-16 PROCEDURE — C9399 UNCLASSIFIED DRUGS OR BIOLOG: HCPCS | Performed by: SPECIALIST

## 2023-09-16 PROCEDURE — 2580000003 HC RX 258: Performed by: STUDENT IN AN ORGANIZED HEALTH CARE EDUCATION/TRAINING PROGRAM

## 2023-09-16 PROCEDURE — 81003 URINALYSIS AUTO W/O SCOPE: CPT

## 2023-09-16 PROCEDURE — 2000000000 HC ICU R&B

## 2023-09-16 PROCEDURE — 70450 CT HEAD/BRAIN W/O DYE: CPT

## 2023-09-16 PROCEDURE — 99291 CRITICAL CARE FIRST HOUR: CPT | Performed by: STUDENT IN AN ORGANIZED HEALTH CARE EDUCATION/TRAINING PROGRAM

## 2023-09-16 PROCEDURE — 99223 1ST HOSP IP/OBS HIGH 75: CPT | Performed by: NEUROLOGICAL SURGERY

## 2023-09-16 PROCEDURE — 83735 ASSAY OF MAGNESIUM: CPT

## 2023-09-16 PROCEDURE — 6360000004 HC RX CONTRAST MEDICATION: Performed by: STUDENT IN AN ORGANIZED HEALTH CARE EDUCATION/TRAINING PROGRAM

## 2023-09-16 RX ORDER — SODIUM CHLORIDE 0.9 % (FLUSH) 0.9 %
5-40 SYRINGE (ML) INJECTION PRN
Status: DISCONTINUED | OUTPATIENT
Start: 2023-09-16 | End: 2023-09-25 | Stop reason: HOSPADM

## 2023-09-16 RX ORDER — SODIUM CHLORIDE 0.9 % (FLUSH) 0.9 %
5-40 SYRINGE (ML) INJECTION EVERY 12 HOURS SCHEDULED
Status: DISCONTINUED | OUTPATIENT
Start: 2023-09-16 | End: 2023-09-25 | Stop reason: HOSPADM

## 2023-09-16 RX ORDER — 0.9 % SODIUM CHLORIDE 0.9 %
250 INTRAVENOUS SOLUTION INTRAVENOUS ONCE
Status: COMPLETED | OUTPATIENT
Start: 2023-09-16 | End: 2023-09-16

## 2023-09-16 RX ORDER — LEVETIRACETAM 5 MG/ML
500 INJECTION INTRAVASCULAR EVERY 12 HOURS
Status: DISCONTINUED | OUTPATIENT
Start: 2023-09-16 | End: 2023-09-17

## 2023-09-16 RX ORDER — SODIUM CHLORIDE 9 MG/ML
INJECTION, SOLUTION INTRAVENOUS PRN
Status: DISCONTINUED | OUTPATIENT
Start: 2023-09-16 | End: 2023-09-25 | Stop reason: HOSPADM

## 2023-09-16 RX ORDER — ONDANSETRON 2 MG/ML
4 INJECTION INTRAMUSCULAR; INTRAVENOUS EVERY 6 HOURS PRN
Status: DISCONTINUED | OUTPATIENT
Start: 2023-09-16 | End: 2023-09-25 | Stop reason: HOSPADM

## 2023-09-16 RX ORDER — SODIUM CHLORIDE 9 MG/ML
INJECTION, SOLUTION INTRAVENOUS PRN
Status: DISCONTINUED | OUTPATIENT
Start: 2023-09-15 | End: 2023-09-25 | Stop reason: HOSPADM

## 2023-09-16 RX ORDER — CLOPIDOGREL BISULFATE 75 MG/1
75 TABLET ORAL DAILY
Status: DISCONTINUED | OUTPATIENT
Start: 2023-09-16 | End: 2023-09-25 | Stop reason: HOSPADM

## 2023-09-16 RX ORDER — MAGNESIUM SULFATE 1 G/100ML
1000 INJECTION INTRAVENOUS PRN
Status: DISCONTINUED | OUTPATIENT
Start: 2023-09-16 | End: 2023-09-25 | Stop reason: HOSPADM

## 2023-09-16 RX ORDER — ATORVASTATIN CALCIUM 40 MG/1
20 TABLET, FILM COATED ORAL DAILY
Status: DISCONTINUED | OUTPATIENT
Start: 2023-09-16 | End: 2023-09-25 | Stop reason: HOSPADM

## 2023-09-16 RX ORDER — ACETAMINOPHEN 325 MG/1
650 TABLET ORAL EVERY 4 HOURS PRN
Status: DISCONTINUED | OUTPATIENT
Start: 2023-09-16 | End: 2023-09-25 | Stop reason: HOSPADM

## 2023-09-16 RX ORDER — LABETALOL HYDROCHLORIDE 5 MG/ML
10 INJECTION, SOLUTION INTRAVENOUS
Status: DISCONTINUED | OUTPATIENT
Start: 2023-09-16 | End: 2023-09-18

## 2023-09-16 RX ORDER — SODIUM CHLORIDE 0.9 % (FLUSH) 0.9 %
5-40 SYRINGE (ML) INJECTION PRN
Status: DISCONTINUED | OUTPATIENT
Start: 2023-09-15 | End: 2023-09-25 | Stop reason: HOSPADM

## 2023-09-16 RX ORDER — IODIXANOL 270 MG/ML
112 INJECTION, SOLUTION INTRAVASCULAR
Status: COMPLETED | OUTPATIENT
Start: 2023-09-16 | End: 2023-09-16

## 2023-09-16 RX ORDER — GABAPENTIN 100 MG/1
200 CAPSULE ORAL NIGHTLY
Status: DISCONTINUED | OUTPATIENT
Start: 2023-09-16 | End: 2023-09-25 | Stop reason: HOSPADM

## 2023-09-16 RX ORDER — ACETAMINOPHEN 325 MG/1
650 TABLET ORAL EVERY 4 HOURS PRN
Status: DISCONTINUED | OUTPATIENT
Start: 2023-09-15 | End: 2023-09-16 | Stop reason: SDUPTHER

## 2023-09-16 RX ORDER — NIMODIPINE 30 MG/1
60 CAPSULE, LIQUID FILLED ORAL
Status: DISCONTINUED | OUTPATIENT
Start: 2023-09-16 | End: 2023-09-16

## 2023-09-16 RX ORDER — SODIUM CHLORIDE 9 MG/ML
INJECTION, SOLUTION INTRAVENOUS CONTINUOUS
Status: DISCONTINUED | OUTPATIENT
Start: 2023-09-16 | End: 2023-09-25 | Stop reason: HOSPADM

## 2023-09-16 RX ORDER — ONDANSETRON 4 MG/1
4 TABLET, ORALLY DISINTEGRATING ORAL EVERY 8 HOURS PRN
Status: DISCONTINUED | OUTPATIENT
Start: 2023-09-16 | End: 2023-09-25 | Stop reason: HOSPADM

## 2023-09-16 RX ORDER — POTASSIUM CHLORIDE 20 MEQ/1
40 TABLET, EXTENDED RELEASE ORAL PRN
Status: DISCONTINUED | OUTPATIENT
Start: 2023-09-16 | End: 2023-09-25 | Stop reason: HOSPADM

## 2023-09-16 RX ORDER — 0.9 % SODIUM CHLORIDE 0.9 %
500 INTRAVENOUS SOLUTION INTRAVENOUS ONCE
Status: COMPLETED | OUTPATIENT
Start: 2023-09-16 | End: 2023-09-16

## 2023-09-16 RX ORDER — ASPIRIN 81 MG/1
81 TABLET, CHEWABLE ORAL DAILY
Status: DISCONTINUED | OUTPATIENT
Start: 2023-09-16 | End: 2023-09-25 | Stop reason: HOSPADM

## 2023-09-16 RX ORDER — POTASSIUM CHLORIDE 7.45 MG/ML
10 INJECTION INTRAVENOUS PRN
Status: DISCONTINUED | OUTPATIENT
Start: 2023-09-16 | End: 2023-09-25 | Stop reason: HOSPADM

## 2023-09-16 RX ADMIN — Medication 60 MG: at 23:45

## 2023-09-16 RX ADMIN — Medication 60 MG: at 12:35

## 2023-09-16 RX ADMIN — SODIUM CHLORIDE: 9 INJECTION, SOLUTION INTRAVENOUS at 08:17

## 2023-09-16 RX ADMIN — SODIUM CHLORIDE 250 ML: 9 INJECTION, SOLUTION INTRAVENOUS at 14:49

## 2023-09-16 RX ADMIN — IODIXANOL 112 ML: 270 INJECTION, SOLUTION INTRAVASCULAR at 00:06

## 2023-09-16 RX ADMIN — CLOPIDOGREL BISULFATE 75 MG: 75 TABLET ORAL at 14:02

## 2023-09-16 RX ADMIN — Medication 60 MG: at 19:46

## 2023-09-16 RX ADMIN — SODIUM CHLORIDE, PRESERVATIVE FREE 10 ML: 5 INJECTION INTRAVENOUS at 19:47

## 2023-09-16 RX ADMIN — LEVETIRACETAM 500 MG: 5 INJECTION, SOLUTION INTRAVENOUS at 09:31

## 2023-09-16 RX ADMIN — GABAPENTIN 200 MG: 100 CAPSULE ORAL at 20:57

## 2023-09-16 RX ADMIN — SODIUM CHLORIDE, PRESERVATIVE FREE 10 ML: 5 INJECTION INTRAVENOUS at 08:54

## 2023-09-16 RX ADMIN — Medication 60 MG: at 06:16

## 2023-09-16 RX ADMIN — ACETAMINOPHEN 650 MG: 325 TABLET ORAL at 04:53

## 2023-09-16 RX ADMIN — ACETAMINOPHEN 650 MG: 325 TABLET ORAL at 17:13

## 2023-09-16 RX ADMIN — ACETAMINOPHEN 650 MG: 325 TABLET ORAL at 22:00

## 2023-09-16 RX ADMIN — SODIUM CHLORIDE 500 ML: 0.9 INJECTION, SOLUTION INTRAVENOUS at 06:49

## 2023-09-16 RX ADMIN — POTASSIUM BICARBONATE 40 MEQ: 782 TABLET, EFFERVESCENT ORAL at 09:22

## 2023-09-16 RX ADMIN — SUGAMMADEX 200 MG: 100 INJECTION, SOLUTION INTRAVENOUS at 00:00

## 2023-09-16 RX ADMIN — NICARDIPINE HYDROCHLORIDE 5 MG/HR: 0.1 INJECTION, SOLUTION INTRAVENOUS at 00:10

## 2023-09-16 RX ADMIN — Medication 60 MG: at 16:04

## 2023-09-16 RX ADMIN — ATORVASTATIN CALCIUM 20 MG: 40 TABLET, FILM COATED ORAL at 08:52

## 2023-09-16 RX ADMIN — SODIUM CHLORIDE: 9 INJECTION, SOLUTION INTRAVENOUS at 14:49

## 2023-09-16 RX ADMIN — LEVETIRACETAM 500 MG: 5 INJECTION, SOLUTION INTRAVENOUS at 21:38

## 2023-09-16 RX ADMIN — SODIUM CHLORIDE: 9 INJECTION, SOLUTION INTRAVENOUS at 04:49

## 2023-09-16 RX ADMIN — ASPIRIN 81 MG: 81 TABLET, CHEWABLE ORAL at 14:02

## 2023-09-16 ASSESSMENT — PAIN SCALES - GENERAL
PAINLEVEL_OUTOF10: 4
PAINLEVEL_OUTOF10: 2
PAINLEVEL_OUTOF10: 2
PAINLEVEL_OUTOF10: 3
PAINLEVEL_OUTOF10: 6
PAINLEVEL_OUTOF10: 2
PAINLEVEL_OUTOF10: 2
PAINLEVEL_OUTOF10: 4

## 2023-09-16 ASSESSMENT — PAIN DESCRIPTION - ORIENTATION
ORIENTATION: ANTERIOR;POSTERIOR;RIGHT
ORIENTATION: ANTERIOR;POSTERIOR

## 2023-09-16 ASSESSMENT — PAIN DESCRIPTION - DESCRIPTORS
DESCRIPTORS: POUNDING;THROBBING

## 2023-09-16 ASSESSMENT — PAIN DESCRIPTION - LOCATION
LOCATION: HEAD

## 2023-09-16 ASSESSMENT — PAIN DESCRIPTION - FREQUENCY
FREQUENCY: CONTINUOUS

## 2023-09-16 ASSESSMENT — PAIN - FUNCTIONAL ASSESSMENT
PAIN_FUNCTIONAL_ASSESSMENT: ACTIVITIES ARE NOT PREVENTED

## 2023-09-16 NOTE — SEDATION DOCUMENTATION
Post Procedure Transfer from IR Table  [x] Tubes and Lines intact    16 fr perez               7.5 OETube              Left A-Line    Post Procedure Neuro-Checks/NIHSS/Vitals  [x] Completed post procedure  [x] Completed bedside handoff  [x] Frequency ordered  [x] Verbal communication of frequency      Post Procedure Puncture Site Checks  [x] Completed post procedure  [x] Completed bedside handoff  [x] Frequency ordered  [x] Verbal communication of frequency    Post Procedure Pulse  Checks  [x] Completed post procedure  [x] Completed bedside handoff  [x] Frequency ordered  [x] Verbal communication of frequency    Order Set  [x] Post Neuro-Endo Procedure  [] Stroke  [] t-PA     B/P control  [x] Verbal Communication SBP 1-130  [x] Order in Care Path    Medication Review  [x] Given during procedure;  IV keppra 1gm (patient received 1 gm in ER), IV ancef 2gm, asa 323 NG, plavix 600mg NG, IV heparin 2000 units  [x] Current drips/meds/fluids; IV Cardene    [x] Physician Notified of All changes in Assessment  2005 patient transported to IR surgical suite. 2010 patient awake, oriented, moving all extremities, following commands, c/o severe headache, light and sound sensitive. Patient communicates appropriately,  pedal pulses palpable bilat feet. Patient has 16 fr perez intact, draining very clear almost colorless urine. 2049 right femoral access obtained   2150 Drs. Measuring aneurysm and consulting with NS.   2201 IV Cardene 10 mg placed in 4000 units heparin/ 1000 ml NS procedure bag (red) as per ordered by .   2230 asa 325 per NG given   2300 baseline ACT=163  2324 pipeline flow diverter deployed in right supraclinoid ICA  2339 plavix 600 mg NG given   2345 IV heparin 2000 units   2350 vascade closure device deployed, Dr. Shayla Chapman holding pressure. 0005 pt in process being extubated. Still under anesthesia, not awake or following commands at this time. Pedal pulses palpable bilat.     0015 pt extubated   0026

## 2023-09-16 NOTE — BRIEF OP NOTE
II    Pre-treatment: Rigth ICA terminus-communicating segment ruptured fusiform aneurysm              Post treatment: contrast stagnation seen in the aneurysm, Mahamed 2, with flow diverter stent well apposed to the wall            POST-PROCEDURAL EXAM :   Stable neurological Exam  Neurological exam performed and unchanged from initial H&P or consult    Closure:  right Vascade 6   F        POST-PROCEDURAL MONITORING : see orders  Disposition: Neuro ICU      Recommendations:  Back to Neuro ICU  Do not bend right leg for 3 hours. Groin checks per protocol. Peripheral pulse checks per protocol. SBP goal 100-140  Head CT at 5am  Continue aspirin 81mg daily (hold until AM CT head done)  Continue plavix 75mg daily (hold until AM CT head done)  Nimodipine 60mg q4hr  Follow up with Shaina Duke MD  2 weeks after discharge and Dr. Jacey Aguilera 3 months after discharge.         MD Cristal Hunt MD   Pager [phone]  Stroke, [id number] [facility]  [address] [place] [address]  Electronically signed 9/15/2023 at 11:58 PM

## 2023-09-16 NOTE — CARE COORDINATION
Transitional Planning  Called [phone], message left family member is from out of town and needing a room. Requested call back to see if there is any availability.

## 2023-09-16 NOTE — ANESTHESIA POSTPROCEDURE EVALUATION
Department of Anesthesiology  Postprocedure Note    Patient: Alyssia Drummond  MRN: [id number]  YOB: 1960  Date of evaluation: 9/16/2023      Procedure Summary     Date: 09/15/23 Room / Location: [address] [place] [address],[address] Special Procedures    Anesthesia Start: 2016 Anesthesia Stop: 09/16/23 0036    Procedure: IR ANGIOGRAM CAROTID CEREBRAL BILATERAL Diagnosis: [facility], DSA and emergent embo of aneurysm)    Scheduled Providers: Beverley Pallas, APRN - CRNA Responsible Provider: Praveen Connolly MD    Anesthesia Type: general ASA Status: 4 - Emergent          Anesthesia Type: No value filed.     Dread Phase I:      Dread Phase II:        Anesthesia Post Evaluation    Patient location during evaluation: ICU  Patient participation: complete - patient participated  Level of consciousness: awake and alert  Airway patency: patent  Nausea & Vomiting: no nausea and no vomiting  Complications: no  Cardiovascular status: blood pressure returned to baseline  Respiratory status: acceptable  Hydration status: stable  Pain management: adequate

## 2023-09-16 NOTE — CARE COORDINATION
Met with pt to assess for support and complete PHQ-9 screen. Pt's husb also present, with pt permission. Pt [place] in the [place] and pt was on her way to visit her mother in [place], [place]. She reports they have no children and no family in the [place] area. Stated their family is supportive, in addition to friends and neighbors. Pt also stated their  is a support system. Pt reports she has had some recent \"sadness\" and crying at night. Husb shared many stressors pt has been experiencing - her mother had recent heart attack in May but is doing well, father  a year ago from heart attack, pt concerned about her and her brother's risk for heart attack, work stressors and recently found out their  is leaving their parish,. Pt denies any SI. Stated when her father , her PCP provided her with meds for her depression but only took it twice as did not like the effects. No prior counseling but plans to follow up with her .  Bunny Yeni offered to locate counseling resources in their area and she declined. Pt advised to keep her PCP updated on how she is feeling and to cont utilizing her support systems. Patient Health Questionnaire-9 (PHQ-9)    Over the last 2 weeks, how often have you been bothered by any of the following problems? 1. Little Interest or pleasure in doing things? [x] Not at all  [] Several Days  [] More than half the day  []  Nearly every day    2. Feeling down, depressed or hopeless? [] Not at all  [] Several Days  [] More than half the day  [x]  Nearly every day    3. Trouble falling or staying asleep, or sleeping too much? [] Not at all  [x] Several Days  [] More than half the day  []  Nearly every day    4. Feeling tired or having little energy? [x] Not at all  [] Several Days  [] More than half the day  []  Nearly every day    5. Poor apettite or overeating? [] Not at all  [x] Several Days  [] More than half the day  []  Nearly every day    6.

## 2023-09-16 NOTE — ANESTHESIA PROCEDURE NOTES
Arterial Line:    An arterial line was placed using surface landmarks, in the pre-op for the following indication(s): continuous blood pressure monitoring. A 20 gauge (size), 1 and 3/4 inch (length), Arrow (type) catheter was placed, Seldinger technique used, into the left radial artery, secured by Tegaderm and tape. Anesthesia type: General    Events:  patient tolerated procedure well with no complications. 9/15/2023 8:35 PM9/15/2023 8:35 PM  Anesthesiologist: Nusrat Obrien MD  Resident/CRNA: AURELIO Jain - SHABANA  Performed: Resident/CRNA   Preanesthetic Checklist  Completed: patient identified, IV checked, risks and benefits discussed, surgical/procedural consents, equipment checked, pre-op evaluation, timeout performed, anesthesia consent given, oxygen available and monitors applied/VS acknowledged

## 2023-09-17 ENCOUNTER — APPOINTMENT (OUTPATIENT)
Dept: GENERAL RADIOLOGY | Age: 63
DRG: 021 | End: 2023-09-17
Payer: MEDICARE

## 2023-09-17 ENCOUNTER — TELEPHONE (OUTPATIENT)
Dept: PRIMARY CARE | Facility: CLINIC | Age: 63
End: 2023-09-17
Payer: MEDICARE

## 2023-09-17 ENCOUNTER — APPOINTMENT (OUTPATIENT)
Dept: CT IMAGING | Age: 63
DRG: 021 | End: 2023-09-17
Payer: MEDICARE

## 2023-09-17 PROBLEM — I61.5 INTRAVENTRICULAR HEMORRHAGE (HCC): Status: ACTIVE | Noted: 2023-09-17

## 2023-09-17 LAB
ANION GAP SERPL CALCULATED.3IONS-SCNC: 9 MMOL/L (ref 9–17)
BASOPHILS # BLD: 0.03 K/UL (ref 0–0.2)
BASOPHILS NFR BLD: 0 % (ref 0–2)
BUN SERPL-MCNC: 12 MG/DL (ref 8–23)
CALCIUM SERPL-MCNC: 8.2 MG/DL (ref 8.6–10.4)
CHLORIDE SERPL-SCNC: 112 MMOL/L (ref 98–107)
CO2 SERPL-SCNC: 24 MMOL/L (ref 20–31)
CREAT SERPL-MCNC: 0.7 MG/DL (ref 0.5–0.9)
EOSINOPHIL # BLD: 0.03 K/UL (ref 0–0.44)
EOSINOPHILS RELATIVE PERCENT: 0 % (ref 1–4)
ERYTHROCYTE [DISTWIDTH] IN BLOOD BY AUTOMATED COUNT: 14.4 % (ref 11.8–14.4)
EST. AVERAGE GLUCOSE BLD GHB EST-MCNC: 111 MG/DL
GFR SERPL CREATININE-BSD FRML MDRD: >60 ML/MIN/1.73M2
GLUCOSE SERPL-MCNC: 117 MG/DL (ref 70–99)
HBA1C MFR BLD: 5.5 % (ref 4–6)
HCT VFR BLD AUTO: 36.5 % (ref 36.3–47.1)
HGB BLD-MCNC: 11.8 G/DL (ref 11.9–15.1)
IMM GRANULOCYTES # BLD AUTO: 0.05 K/UL (ref 0–0.3)
IMM GRANULOCYTES NFR BLD: 0 %
INHIBITION AA TEG: 95.5 % (ref 0–11)
INHIBITION ADP TEG: 97.7 % (ref 0–17)
LYMPHOCYTES NFR BLD: 3.28 K/UL (ref 1.1–3.7)
LYMPHOCYTES RELATIVE PERCENT: 29 % (ref 24–43)
MA (MAX CLOT) TEG KAOLIN: 66 MM (ref 53–68)
MA(AA) TEG: 17 MM (ref 51–71)
MA(ACTIVATED) TEG: 14.7 MM (ref 2–19)
MA(ADP) TEG: 15.9 MM (ref 45–69)
MAGNESIUM SERPL-MCNC: 1.7 MG/DL (ref 1.6–2.6)
MCH RBC QN AUTO: 30.7 PG (ref 25.2–33.5)
MCHC RBC AUTO-ENTMCNC: 32.3 G/DL (ref 28.4–34.8)
MCV RBC AUTO: 95.1 FL (ref 82.6–102.9)
MONOCYTES NFR BLD: 0.76 K/UL (ref 0.1–1.2)
MONOCYTES NFR BLD: 7 % (ref 3–12)
NEUTROPHILS NFR BLD: 64 % (ref 36–65)
NEUTS SEG NFR BLD: 7.29 K/UL (ref 1.5–8.1)
NRBC BLD-RTO: 0 PER 100 WBC
PLATELET # BLD AUTO: 243 K/UL (ref 138–453)
PMV BLD AUTO: 10.8 FL (ref 8.1–13.5)
POTASSIUM SERPL-SCNC: 3.5 MMOL/L (ref 3.7–5.3)
RBC # BLD AUTO: 3.84 M/UL (ref 3.95–5.11)
SODIUM SERPL-SCNC: 145 MMOL/L (ref 135–144)
WBC OTHER # BLD: 11.4 K/UL (ref 3.5–11.3)

## 2023-09-17 PROCEDURE — 6370000000 HC RX 637 (ALT 250 FOR IP): Performed by: NURSE PRACTITIONER

## 2023-09-17 PROCEDURE — 85576 BLOOD PLATELET AGGREGATION: CPT

## 2023-09-17 PROCEDURE — 97161 PT EVAL LOW COMPLEX 20 MIN: CPT

## 2023-09-17 PROCEDURE — 97166 OT EVAL MOD COMPLEX 45 MIN: CPT

## 2023-09-17 PROCEDURE — 2000000000 HC ICU R&B

## 2023-09-17 PROCEDURE — 6370000000 HC RX 637 (ALT 250 FOR IP)

## 2023-09-17 PROCEDURE — 2580000003 HC RX 258

## 2023-09-17 PROCEDURE — 70450 CT HEAD/BRAIN W/O DYE: CPT

## 2023-09-17 PROCEDURE — 71045 X-RAY EXAM CHEST 1 VIEW: CPT

## 2023-09-17 PROCEDURE — 97116 GAIT TRAINING THERAPY: CPT

## 2023-09-17 PROCEDURE — 97535 SELF CARE MNGMENT TRAINING: CPT

## 2023-09-17 PROCEDURE — 99291 CRITICAL CARE FIRST HOUR: CPT | Performed by: STUDENT IN AN ORGANIZED HEALTH CARE EDUCATION/TRAINING PROGRAM

## 2023-09-17 PROCEDURE — 6360000002 HC RX W HCPCS: Performed by: NURSE PRACTITIONER

## 2023-09-17 PROCEDURE — 2580000003 HC RX 258: Performed by: PSYCHIATRY & NEUROLOGY

## 2023-09-17 PROCEDURE — 99233 SBSQ HOSP IP/OBS HIGH 50: CPT | Performed by: PSYCHIATRY & NEUROLOGY

## 2023-09-17 PROCEDURE — 36415 COLL VENOUS BLD VENIPUNCTURE: CPT

## 2023-09-17 PROCEDURE — 99232 SBSQ HOSP IP/OBS MODERATE 35: CPT | Performed by: NEUROLOGICAL SURGERY

## 2023-09-17 PROCEDURE — 83735 ASSAY OF MAGNESIUM: CPT

## 2023-09-17 PROCEDURE — 85025 COMPLETE CBC W/AUTO DIFF WBC: CPT

## 2023-09-17 PROCEDURE — 6360000002 HC RX W HCPCS

## 2023-09-17 PROCEDURE — 97530 THERAPEUTIC ACTIVITIES: CPT

## 2023-09-17 PROCEDURE — 80048 BASIC METABOLIC PNL TOTAL CA: CPT

## 2023-09-17 RX ORDER — ENOXAPARIN SODIUM 100 MG/ML
40 INJECTION SUBCUTANEOUS DAILY
Status: DISCONTINUED | OUTPATIENT
Start: 2023-09-17 | End: 2023-09-25 | Stop reason: HOSPADM

## 2023-09-17 RX ORDER — POTASSIUM CHLORIDE 20 MEQ/1
40 TABLET, EXTENDED RELEASE ORAL ONCE
Status: COMPLETED | OUTPATIENT
Start: 2023-09-17 | End: 2023-09-17

## 2023-09-17 RX ORDER — MAGNESIUM SULFATE IN WATER 40 MG/ML
2000 INJECTION, SOLUTION INTRAVENOUS ONCE
Status: COMPLETED | OUTPATIENT
Start: 2023-09-17 | End: 2023-09-17

## 2023-09-17 RX ORDER — LEVETIRACETAM 500 MG/1
500 TABLET ORAL 2 TIMES DAILY
Status: COMPLETED | OUTPATIENT
Start: 2023-09-17 | End: 2023-09-22

## 2023-09-17 RX ADMIN — Medication 60 MG: at 08:09

## 2023-09-17 RX ADMIN — ASPIRIN 81 MG: 81 TABLET, CHEWABLE ORAL at 08:10

## 2023-09-17 RX ADMIN — POTASSIUM CHLORIDE 40 MEQ: 1500 TABLET, EXTENDED RELEASE ORAL at 10:42

## 2023-09-17 RX ADMIN — SODIUM CHLORIDE, PRESERVATIVE FREE 10 ML: 5 INJECTION INTRAVENOUS at 21:57

## 2023-09-17 RX ADMIN — SODIUM CHLORIDE: 9 INJECTION, SOLUTION INTRAVENOUS at 08:54

## 2023-09-17 RX ADMIN — LEVETIRACETAM 500 MG: 500 TABLET, FILM COATED ORAL at 08:15

## 2023-09-17 RX ADMIN — Medication 60 MG: at 11:57

## 2023-09-17 RX ADMIN — SODIUM CHLORIDE, PRESERVATIVE FREE 10 ML: 5 INJECTION INTRAVENOUS at 08:15

## 2023-09-17 RX ADMIN — ENOXAPARIN SODIUM 40 MG: 100 INJECTION SUBCUTANEOUS at 10:42

## 2023-09-17 RX ADMIN — GABAPENTIN 200 MG: 100 CAPSULE ORAL at 21:57

## 2023-09-17 RX ADMIN — ACETAMINOPHEN 650 MG: 325 TABLET ORAL at 04:39

## 2023-09-17 RX ADMIN — Medication 60 MG: at 04:35

## 2023-09-17 RX ADMIN — POTASSIUM BICARBONATE 40 MEQ: 782 TABLET, EFFERVESCENT ORAL at 08:09

## 2023-09-17 RX ADMIN — SODIUM CHLORIDE: 9 INJECTION, SOLUTION INTRAVENOUS at 22:04

## 2023-09-17 RX ADMIN — Medication 60 MG: at 19:42

## 2023-09-17 RX ADMIN — ACETAMINOPHEN 650 MG: 325 TABLET ORAL at 08:10

## 2023-09-17 RX ADMIN — CLOPIDOGREL BISULFATE 75 MG: 75 TABLET ORAL at 08:09

## 2023-09-17 RX ADMIN — SODIUM CHLORIDE: 9 INJECTION, SOLUTION INTRAVENOUS at 09:07

## 2023-09-17 RX ADMIN — SODIUM CHLORIDE, PRESERVATIVE FREE 10 ML: 5 INJECTION INTRAVENOUS at 08:16

## 2023-09-17 RX ADMIN — LEVETIRACETAM 500 MG: 500 TABLET, FILM COATED ORAL at 21:57

## 2023-09-17 RX ADMIN — Medication 60 MG: at 16:22

## 2023-09-17 RX ADMIN — MAGNESIUM SULFATE HEPTAHYDRATE 2000 MG: 40 INJECTION, SOLUTION INTRAVENOUS at 08:56

## 2023-09-17 RX ADMIN — ACETAMINOPHEN 650 MG: 325 TABLET ORAL at 19:42

## 2023-09-17 RX ADMIN — ATORVASTATIN CALCIUM 20 MG: 40 TABLET, FILM COATED ORAL at 08:09

## 2023-09-17 RX ADMIN — LABETALOL HYDROCHLORIDE 10 MG: 5 INJECTION, SOLUTION INTRAVENOUS at 18:49

## 2023-09-17 ASSESSMENT — PAIN SCALES - GENERAL
PAINLEVEL_OUTOF10: 2
PAINLEVEL_OUTOF10: 4
PAINLEVEL_OUTOF10: 2
PAINLEVEL_OUTOF10: 1
PAINLEVEL_OUTOF10: 5
PAINLEVEL_OUTOF10: 2
PAINLEVEL_OUTOF10: 2

## 2023-09-17 ASSESSMENT — PAIN - FUNCTIONAL ASSESSMENT
PAIN_FUNCTIONAL_ASSESSMENT: ACTIVITIES ARE NOT PREVENTED

## 2023-09-17 ASSESSMENT — PAIN DESCRIPTION - LOCATION
LOCATION: HEAD

## 2023-09-17 ASSESSMENT — PAIN DESCRIPTION - DESCRIPTORS
DESCRIPTORS: POUNDING;THROBBING
DESCRIPTORS: THROBBING
DESCRIPTORS: THROBBING

## 2023-09-17 ASSESSMENT — PAIN DESCRIPTION - ORIENTATION
ORIENTATION: LEFT;ANTERIOR;POSTERIOR
ORIENTATION: RIGHT;ANTERIOR;POSTERIOR
ORIENTATION: ANTERIOR;POSTERIOR;RIGHT
ORIENTATION: ANTERIOR;POSTERIOR;RIGHT
ORIENTATION: RIGHT;ANTERIOR;POSTERIOR

## 2023-09-17 NOTE — CARE COORDINATION
Case Management Assessment  Initial Evaluation    Date/Time of Evaluation: 9/17/2023 3:01 PM  Assessment Completed by: Trinity Cordova RN    If patient is discharged prior to next notation, then this note serves as note for discharge by case management. Patient Name: [facility]                   YOB: 1960  Diagnosis: Subarachnoid hemorrhage [facility]) [I60.9]                   Date / Time: 9/15/2023  6:11 PM    Patient Admission Status: Inpatient   Readmission Risk (Low < 19, Mod (19-27), High > 27): Readmission Risk Score: 10.9    Current PCP: No primary care provider on file. PCP verified by CM? (P) Yes (Dr. Zeny Mcguire)    Chart Reviewed: Yes      History Provided by: (P) Patient  Patient Orientation: (P) Alert and Oriented, Person, Place, Situation    Patient Cognition: (P) Alert    Hospitalization in the last 30 days (Readmission):  No    If yes, Readmission Assessment in CM Navigator will be completed.     Advance Directives:      Code Status: Full Code   Patient's Primary Decision Maker is: (P) Legal Next of Kin      Discharge Planning:    Patient lives with: (P) Spouse/Significant Other Type of Home: (P) House  Primary Care Giver: (P) Self  Patient Support Systems include: (P) Spouse/Significant Other, Family Members, Friends/Neighbors   Current Financial resources: (P) Other (Comment) ([facility])  Current community resources: (P) None  Current services prior to admission: (P) None            Current DME:              Type of Home Care services:  (P) None    ADLS  Prior functional level: (P) Independent in ADLs/IADLs  Current functional level: (P) Assistance with the following:, Toileting, Mobility    PT AM-PAC: 22 /24  OT AM-PAC: 21 /24    Family can provide assistance at DC: (P) Yes  Would you like Case Management to discuss the discharge plan with any other family members/significant others, and if so, who? (P) No  Plans to Return to Present Housing: (P) Yes  Other Identified

## 2023-09-17 NOTE — TELEPHONE ENCOUNTER
Spouse called - they are in [place]  Pt with severe headache - went to [facility]  Has an intracranial bleed  Seeking advice on what hospital in [place]  I am not familiar with any - rec they go to the biggest tertiary care center in [place] or if need be can see if we can arrange transport to   He will call back if needed

## 2023-09-18 ENCOUNTER — APPOINTMENT (OUTPATIENT)
Dept: VASCULAR LAB | Age: 63
DRG: 021 | End: 2023-09-18
Payer: MEDICARE

## 2023-09-18 LAB
ANION GAP SERPL CALCULATED.3IONS-SCNC: 10 MMOL/L (ref 9–17)
BASOPHILS # BLD: 0.04 K/UL (ref 0–0.2)
BASOPHILS NFR BLD: 0 % (ref 0–2)
BUN SERPL-MCNC: 12 MG/DL (ref 8–23)
CALCIUM SERPL-MCNC: 7.9 MG/DL (ref 8.6–10.4)
CHLORIDE SERPL-SCNC: 110 MMOL/L (ref 98–107)
CO2 SERPL-SCNC: 22 MMOL/L (ref 20–31)
CREAT SERPL-MCNC: 0.6 MG/DL (ref 0.5–0.9)
EOSINOPHIL # BLD: 0.06 K/UL (ref 0–0.44)
EOSINOPHILS RELATIVE PERCENT: 1 % (ref 1–4)
ERYTHROCYTE [DISTWIDTH] IN BLOOD BY AUTOMATED COUNT: 14.6 % (ref 11.8–14.4)
GFR SERPL CREATININE-BSD FRML MDRD: >60 ML/MIN/1.73M2
GLUCOSE SERPL-MCNC: 108 MG/DL (ref 70–99)
HCT VFR BLD AUTO: 35.7 % (ref 36.3–47.1)
HGB BLD-MCNC: 11.4 G/DL (ref 11.9–15.1)
IMM GRANULOCYTES # BLD AUTO: <0.03 K/UL (ref 0–0.3)
IMM GRANULOCYTES NFR BLD: 0 %
LYMPHOCYTES NFR BLD: 2.58 K/UL (ref 1.1–3.7)
LYMPHOCYTES RELATIVE PERCENT: 27 % (ref 24–43)
MAGNESIUM SERPL-MCNC: 1.9 MG/DL (ref 1.6–2.6)
MCH RBC QN AUTO: 30.4 PG (ref 25.2–33.5)
MCHC RBC AUTO-ENTMCNC: 31.9 G/DL (ref 28.4–34.8)
MCV RBC AUTO: 95.2 FL (ref 82.6–102.9)
MONOCYTES NFR BLD: 0.78 K/UL (ref 0.1–1.2)
MONOCYTES NFR BLD: 8 % (ref 3–12)
NEUTROPHILS NFR BLD: 64 % (ref 36–65)
NEUTS SEG NFR BLD: 6.13 K/UL (ref 1.5–8.1)
NRBC BLD-RTO: 0 PER 100 WBC
PLATELET # BLD AUTO: 223 K/UL (ref 138–453)
PMV BLD AUTO: 11 FL (ref 8.1–13.5)
POTASSIUM SERPL-SCNC: 3.9 MMOL/L (ref 3.7–5.3)
RBC # BLD AUTO: 3.75 M/UL (ref 3.95–5.11)
RBC # BLD: ABNORMAL 10*6/UL
SODIUM SERPL-SCNC: 142 MMOL/L (ref 135–144)
WBC OTHER # BLD: 9.6 K/UL (ref 3.5–11.3)

## 2023-09-18 PROCEDURE — 2000000000 HC ICU R&B

## 2023-09-18 PROCEDURE — 6370000000 HC RX 637 (ALT 250 FOR IP)

## 2023-09-18 PROCEDURE — 93886 INTRACRANIAL COMPLETE STUDY: CPT

## 2023-09-18 PROCEDURE — 99233 SBSQ HOSP IP/OBS HIGH 50: CPT | Performed by: PSYCHIATRY & NEUROLOGY

## 2023-09-18 PROCEDURE — 99232 SBSQ HOSP IP/OBS MODERATE 35: CPT | Performed by: PSYCHIATRY & NEUROLOGY

## 2023-09-18 PROCEDURE — 2580000003 HC RX 258: Performed by: PSYCHIATRY & NEUROLOGY

## 2023-09-18 PROCEDURE — 85025 COMPLETE CBC W/AUTO DIFF WBC: CPT

## 2023-09-18 PROCEDURE — 83735 ASSAY OF MAGNESIUM: CPT

## 2023-09-18 PROCEDURE — 80048 BASIC METABOLIC PNL TOTAL CA: CPT

## 2023-09-18 PROCEDURE — 6370000000 HC RX 637 (ALT 250 FOR IP): Performed by: NURSE PRACTITIONER

## 2023-09-18 PROCEDURE — 2580000003 HC RX 258

## 2023-09-18 PROCEDURE — 6360000002 HC RX W HCPCS: Performed by: NURSE PRACTITIONER

## 2023-09-18 PROCEDURE — 6360000002 HC RX W HCPCS

## 2023-09-18 RX ORDER — LABETALOL HYDROCHLORIDE 5 MG/ML
10 INJECTION, SOLUTION INTRAVENOUS
Status: DISCONTINUED | OUTPATIENT
Start: 2023-09-18 | End: 2023-09-25 | Stop reason: HOSPADM

## 2023-09-18 RX ORDER — NIMODIPINE 30 MG/1
60 CAPSULE, LIQUID FILLED ORAL
Status: DISCONTINUED | OUTPATIENT
Start: 2023-09-18 | End: 2023-09-25 | Stop reason: HOSPADM

## 2023-09-18 RX ORDER — CALCIUM GLUCONATE 20 MG/ML
2000 INJECTION, SOLUTION INTRAVENOUS ONCE
Status: COMPLETED | OUTPATIENT
Start: 2023-09-18 | End: 2023-09-18

## 2023-09-18 RX ORDER — SENNA AND DOCUSATE SODIUM 50; 8.6 MG/1; MG/1
2 TABLET, FILM COATED ORAL DAILY
Status: DISCONTINUED | OUTPATIENT
Start: 2023-09-18 | End: 2023-09-25 | Stop reason: HOSPADM

## 2023-09-18 RX ORDER — MAGNESIUM SULFATE 1 G/100ML
1000 INJECTION INTRAVENOUS ONCE
Status: COMPLETED | OUTPATIENT
Start: 2023-09-18 | End: 2023-09-18

## 2023-09-18 RX ADMIN — CALCIUM GLUCONATE 2000 MG: 20 INJECTION, SOLUTION INTRAVENOUS at 08:23

## 2023-09-18 RX ADMIN — SODIUM CHLORIDE, PRESERVATIVE FREE 10 ML: 5 INJECTION INTRAVENOUS at 08:45

## 2023-09-18 RX ADMIN — ASPIRIN 81 MG: 81 TABLET, CHEWABLE ORAL at 08:06

## 2023-09-18 RX ADMIN — MAGNESIUM SULFATE HEPTAHYDRATE 1000 MG: 1 INJECTION, SOLUTION INTRAVENOUS at 08:43

## 2023-09-18 RX ADMIN — Medication 60 MG: at 00:13

## 2023-09-18 RX ADMIN — GABAPENTIN 200 MG: 100 CAPSULE ORAL at 19:58

## 2023-09-18 RX ADMIN — SODIUM CHLORIDE, PRESERVATIVE FREE 10 ML: 5 INJECTION INTRAVENOUS at 19:58

## 2023-09-18 RX ADMIN — SODIUM CHLORIDE: 9 INJECTION, SOLUTION INTRAVENOUS at 13:32

## 2023-09-18 RX ADMIN — ACETAMINOPHEN 650 MG: 325 TABLET ORAL at 21:50

## 2023-09-18 RX ADMIN — Medication 60 MG: at 12:05

## 2023-09-18 RX ADMIN — LABETALOL HYDROCHLORIDE 10 MG: 5 INJECTION, SOLUTION INTRAVENOUS at 09:34

## 2023-09-18 RX ADMIN — LEVETIRACETAM 500 MG: 500 TABLET, FILM COATED ORAL at 19:58

## 2023-09-18 RX ADMIN — Medication 60 MG: at 08:06

## 2023-09-18 RX ADMIN — LABETALOL HYDROCHLORIDE 10 MG: 5 INJECTION, SOLUTION INTRAVENOUS at 04:18

## 2023-09-18 RX ADMIN — SODIUM CHLORIDE: 9 INJECTION, SOLUTION INTRAVENOUS at 08:41

## 2023-09-18 RX ADMIN — NIMODIPINE 60 MG: 30 CAPSULE, LIQUID FILLED ORAL at 19:58

## 2023-09-18 RX ADMIN — CLOPIDOGREL BISULFATE 75 MG: 75 TABLET ORAL at 08:07

## 2023-09-18 RX ADMIN — ENOXAPARIN SODIUM 40 MG: 100 INJECTION SUBCUTANEOUS at 08:06

## 2023-09-18 RX ADMIN — ACETAMINOPHEN 650 MG: 325 TABLET ORAL at 08:50

## 2023-09-18 RX ADMIN — ACETAMINOPHEN 650 MG: 325 TABLET ORAL at 03:51

## 2023-09-18 RX ADMIN — NIMODIPINE 60 MG: 30 CAPSULE, LIQUID FILLED ORAL at 16:14

## 2023-09-18 RX ADMIN — LEVETIRACETAM 500 MG: 500 TABLET, FILM COATED ORAL at 08:06

## 2023-09-18 RX ADMIN — Medication 60 MG: at 03:36

## 2023-09-18 RX ADMIN — ATORVASTATIN CALCIUM 20 MG: 40 TABLET, FILM COATED ORAL at 08:07

## 2023-09-18 RX ADMIN — SENNOSIDES AND DOCUSATE SODIUM 2 TABLET: 50; 8.6 TABLET ORAL at 16:15

## 2023-09-18 ASSESSMENT — PAIN SCALES - GENERAL
PAINLEVEL_OUTOF10: 4
PAINLEVEL_OUTOF10: 4
PAINLEVEL_OUTOF10: 2
PAINLEVEL_OUTOF10: 2
PAINLEVEL_OUTOF10: 3
PAINLEVEL_OUTOF10: 2
PAINLEVEL_OUTOF10: 1

## 2023-09-18 ASSESSMENT — PAIN DESCRIPTION - LOCATION
LOCATION: HEAD

## 2023-09-18 ASSESSMENT — PAIN DESCRIPTION - DESCRIPTORS
DESCRIPTORS: HEAVINESS;POUNDING
DESCRIPTORS: POUNDING
DESCRIPTORS: POUNDING

## 2023-09-18 ASSESSMENT — PAIN DESCRIPTION - ORIENTATION
ORIENTATION: ANTERIOR;POSTERIOR

## 2023-09-18 ASSESSMENT — PAIN - FUNCTIONAL ASSESSMENT
PAIN_FUNCTIONAL_ASSESSMENT: ACTIVITIES ARE NOT PREVENTED
PAIN_FUNCTIONAL_ASSESSMENT: ACTIVITIES ARE NOT PREVENTED

## 2023-09-19 ENCOUNTER — APPOINTMENT (OUTPATIENT)
Age: 63
DRG: 021 | End: 2023-09-19
Payer: MEDICARE

## 2023-09-19 ENCOUNTER — TELEPHONE (OUTPATIENT)
Dept: PRIMARY CARE | Facility: CLINIC | Age: 63
End: 2023-09-19
Payer: MEDICARE

## 2023-09-19 ENCOUNTER — APPOINTMENT (OUTPATIENT)
Dept: VASCULAR LAB | Age: 63
DRG: 021 | End: 2023-09-19
Payer: MEDICARE

## 2023-09-19 LAB
ANION GAP SERPL CALCULATED.3IONS-SCNC: 12 MMOL/L (ref 9–17)
BASOPHILS # BLD: 0.05 K/UL (ref 0–0.2)
BASOPHILS NFR BLD: 0 % (ref 0–2)
BUN SERPL-MCNC: 11 MG/DL (ref 8–23)
CA-I BLD-SCNC: 1.15 MMOL/L (ref 1.13–1.33)
CALCIUM SERPL-MCNC: 8.4 MG/DL (ref 8.6–10.4)
CHLORIDE SERPL-SCNC: 108 MMOL/L (ref 98–107)
CO2 SERPL-SCNC: 21 MMOL/L (ref 20–31)
CREAT SERPL-MCNC: 0.7 MG/DL (ref 0.5–0.9)
ECHO BSA: 1.73 M2
ECHO BSA: 1.73 M2
EOSINOPHIL # BLD: 0.19 K/UL (ref 0–0.44)
EOSINOPHILS RELATIVE PERCENT: 2 % (ref 1–4)
ERYTHROCYTE [DISTWIDTH] IN BLOOD BY AUTOMATED COUNT: 14.5 % (ref 11.8–14.4)
GFR SERPL CREATININE-BSD FRML MDRD: >60 ML/MIN/1.73M2
GLUCOSE SERPL-MCNC: 121 MG/DL (ref 70–99)
HCT VFR BLD AUTO: 40.7 % (ref 36.3–47.1)
HGB BLD-MCNC: 12.9 G/DL (ref 11.9–15.1)
IMM GRANULOCYTES # BLD AUTO: 0.03 K/UL (ref 0–0.3)
IMM GRANULOCYTES NFR BLD: 0 %
LYMPHOCYTES NFR BLD: 3.03 K/UL (ref 1.1–3.7)
LYMPHOCYTES RELATIVE PERCENT: 27 % (ref 24–43)
MAGNESIUM SERPL-MCNC: 1.9 MG/DL (ref 1.6–2.6)
MCH RBC QN AUTO: 30.4 PG (ref 25.2–33.5)
MCHC RBC AUTO-ENTMCNC: 31.7 G/DL (ref 28.4–34.8)
MCV RBC AUTO: 95.8 FL (ref 82.6–102.9)
MONOCYTES NFR BLD: 0.86 K/UL (ref 0.1–1.2)
MONOCYTES NFR BLD: 8 % (ref 3–12)
NEUTROPHILS NFR BLD: 63 % (ref 36–65)
NEUTS SEG NFR BLD: 6.97 K/UL (ref 1.5–8.1)
NRBC BLD-RTO: 0 PER 100 WBC
PLATELET # BLD AUTO: 239 K/UL (ref 138–453)
PMV BLD AUTO: 11.2 FL (ref 8.1–13.5)
POTASSIUM SERPL-SCNC: 4 MMOL/L (ref 3.7–5.3)
RBC # BLD AUTO: 4.25 M/UL (ref 3.95–5.11)
RBC # BLD: ABNORMAL 10*6/UL
REASON FOR REJECTION: NORMAL
SODIUM SERPL-SCNC: 141 MMOL/L (ref 135–144)
SPECIMEN SOURCE: NORMAL
VAS BASILAR MEAN VEL MANUAL: 35.8 CM/S
VAS BASILAR MEAN VEL MANUAL: 52.4 CM/S
VAS LEFT ACA MEAN VEL MANUAL: 42 CM/S
VAS LEFT DIST MCA MEAN VEL MANUAL: 62 CM/S
VAS LEFT DIST MCA MEAN VEL MANUAL: 81.6 CM/S
VAS LEFT DISTAL ICA MEAN VEL MANUAL: 21.9 CM/S
VAS LEFT DISTAL ICA MEAN VEL MANUAL: 29.3 CM/S
VAS LEFT LINDEGAARD RATIO MANUAL: 3.13
VAS LEFT LINDEGAARD RATIO MANUAL: 3.65
VAS LEFT MCA BIFURCATION MEAN VEL MANUAL: 77.8 CM/S
VAS LEFT MCA MEAN VEL MANUAL: 72.8 CM/S
VAS LEFT MID ACA MEAN VEL MANUAL: 77 CM/S
VAS LEFT MID MCA MEAN VEL MANUAL: 79.3 CM/S
VAS LEFT MID MCA MEAN VEL MANUAL: 91.6 CM/S
VAS LEFT PCA MEAN VEL MANUAL: 30.8 CM/S
VAS LEFT PROX ACA MEAN VEL MANUAL: 72.8 CM/S
VAS LEFT PROX MCA MEAN VEL MANUAL: 80.1 CM/S
VAS LEFT PROX MCA MEAN VEL MANUAL: 82 CM/S
VAS LEFT SIPHON MEAN VEL MANUAL: 34.6 CM/S
VAS LEFT SIPHON MEAN VEL MANUAL: 38.1 CM/S
VAS LEFT TERMINAL ICA MEAN VEL MANUAL: 50 CM/S
VAS LEFT VERTEBRAL MEAN VEL MANUAL: 26.9 CM/S
VAS LEFT VERTEBRAL MEAN VEL MANUAL: 33.5 CM/S
VAS RIGHT ACA MEAN VEL MANUAL: 66.6 CM/S
VAS RIGHT DIST MCA MEAN VEL MANUAL: 61.2 CM/S
VAS RIGHT DIST MCA MEAN VEL MANUAL: 66.2 CM/S
VAS RIGHT DISTAL ICA MEAN VEL MANUAL: 19.6 CM/S
VAS RIGHT DISTAL ICA MEAN VEL MANUAL: 21.6 CM/S
VAS RIGHT LINDEGAARD RATIO MANUAL: 3
VAS RIGHT LINDEGAARD RATIO MANUAL: 3.9
VAS RIGHT MCA BIFURCATION MEAN VEL MANUAL: 50 CM/S
VAS RIGHT MCA MEAN VEL MANUAL: 53.5 CM/S
VAS RIGHT MID ACA MEAN VEL MANUAL: 46.6 CM/S
VAS RIGHT MID ACA MEAN VEL MANUAL: 62 CM/S
VAS RIGHT MID MCA MEAN VEL MANUAL: 63.1 CM/S
VAS RIGHT MID MCA MEAN VEL MANUAL: 75.8 CM/S
VAS RIGHT PCA MEAN VEL MANUAL: 26.2 CM/S
VAS RIGHT PCA MEAN VEL MANUAL: 33.5 CM/S
VAS RIGHT PROX ACA MEAN VEL MANUAL: 47.7 CM/S
VAS RIGHT PROX ACA MEAN VEL MANUAL: 67.8 CM/S
VAS RIGHT PROX MCA MEAN VEL MANUAL: 63.5 CM/S
VAS RIGHT PROX MCA MEAN VEL MANUAL: 64.3 CM/S
VAS RIGHT SIPHON MEAN VEL MANUAL: 21.2 CM/S
VAS RIGHT SIPHON MEAN VEL MANUAL: 50 CM/S
VAS RIGHT TERMINAL ICA MEAN VEL MANUAL: 42.7 CM/S
VAS RIGHT TERMINAL ICA MEAN VEL MANUAL: 50 CM/S
VAS RIGHT VERTEBRAL MEAN VEL MANUAL: 28.1 CM/S
VAS RIGHT VERTEBRAL MEAN VEL MANUAL: 40.8 CM/S
WBC OTHER # BLD: 11.1 K/UL (ref 3.5–11.3)
ZZ NTE CLEAN UP: ORDERED TEST: NORMAL

## 2023-09-19 PROCEDURE — 2580000003 HC RX 258

## 2023-09-19 PROCEDURE — 82330 ASSAY OF CALCIUM: CPT

## 2023-09-19 PROCEDURE — 85025 COMPLETE CBC W/AUTO DIFF WBC: CPT

## 2023-09-19 PROCEDURE — 97116 GAIT TRAINING THERAPY: CPT

## 2023-09-19 PROCEDURE — 93886 INTRACRANIAL COMPLETE STUDY: CPT | Performed by: PSYCHIATRY & NEUROLOGY

## 2023-09-19 PROCEDURE — 99232 SBSQ HOSP IP/OBS MODERATE 35: CPT | Performed by: PSYCHIATRY & NEUROLOGY

## 2023-09-19 PROCEDURE — 2000000000 HC ICU R&B

## 2023-09-19 PROCEDURE — 6370000000 HC RX 637 (ALT 250 FOR IP)

## 2023-09-19 PROCEDURE — 99233 SBSQ HOSP IP/OBS HIGH 50: CPT | Performed by: PSYCHIATRY & NEUROLOGY

## 2023-09-19 PROCEDURE — 97110 THERAPEUTIC EXERCISES: CPT

## 2023-09-19 PROCEDURE — 80048 BASIC METABOLIC PNL TOTAL CA: CPT

## 2023-09-19 PROCEDURE — 6360000002 HC RX W HCPCS: Performed by: NURSE PRACTITIONER

## 2023-09-19 PROCEDURE — 93886 INTRACRANIAL COMPLETE STUDY: CPT

## 2023-09-19 PROCEDURE — 6370000000 HC RX 637 (ALT 250 FOR IP): Performed by: NURSE PRACTITIONER

## 2023-09-19 PROCEDURE — 83735 ASSAY OF MAGNESIUM: CPT

## 2023-09-19 PROCEDURE — APPNB60 APP NON BILLABLE TIME 46-60 MINS: Performed by: NURSE PRACTITIONER

## 2023-09-19 PROCEDURE — 2580000003 HC RX 258: Performed by: PSYCHIATRY & NEUROLOGY

## 2023-09-19 RX ORDER — POLYETHYLENE GLYCOL 3350 17 G/17G
17 POWDER, FOR SOLUTION ORAL DAILY PRN
Status: DISCONTINUED | OUTPATIENT
Start: 2023-09-19 | End: 2023-09-25 | Stop reason: HOSPADM

## 2023-09-19 RX ORDER — MAGNESIUM SULFATE IN WATER 40 MG/ML
2000 INJECTION, SOLUTION INTRAVENOUS ONCE
Status: COMPLETED | OUTPATIENT
Start: 2023-09-19 | End: 2023-09-19

## 2023-09-19 RX ADMIN — SODIUM CHLORIDE: 9 INJECTION, SOLUTION INTRAVENOUS at 16:18

## 2023-09-19 RX ADMIN — ASPIRIN 81 MG: 81 TABLET, CHEWABLE ORAL at 07:44

## 2023-09-19 RX ADMIN — LEVETIRACETAM 500 MG: 500 TABLET, FILM COATED ORAL at 07:43

## 2023-09-19 RX ADMIN — NIMODIPINE 60 MG: 30 CAPSULE, LIQUID FILLED ORAL at 05:02

## 2023-09-19 RX ADMIN — NIMODIPINE 60 MG: 30 CAPSULE, LIQUID FILLED ORAL at 19:52

## 2023-09-19 RX ADMIN — LEVETIRACETAM 500 MG: 500 TABLET, FILM COATED ORAL at 21:05

## 2023-09-19 RX ADMIN — POLYETHYLENE GLYCOL 3350 17 G: 17 POWDER, FOR SOLUTION ORAL at 15:59

## 2023-09-19 RX ADMIN — ATORVASTATIN CALCIUM 20 MG: 40 TABLET, FILM COATED ORAL at 07:43

## 2023-09-19 RX ADMIN — SODIUM CHLORIDE, PRESERVATIVE FREE 10 ML: 5 INJECTION INTRAVENOUS at 07:46

## 2023-09-19 RX ADMIN — SODIUM CHLORIDE, PRESERVATIVE FREE 10 ML: 5 INJECTION INTRAVENOUS at 07:58

## 2023-09-19 RX ADMIN — NIMODIPINE 60 MG: 30 CAPSULE, LIQUID FILLED ORAL at 00:11

## 2023-09-19 RX ADMIN — ACETAMINOPHEN 650 MG: 325 TABLET ORAL at 05:02

## 2023-09-19 RX ADMIN — CLOPIDOGREL BISULFATE 75 MG: 75 TABLET ORAL at 07:43

## 2023-09-19 RX ADMIN — ENOXAPARIN SODIUM 40 MG: 100 INJECTION SUBCUTANEOUS at 07:43

## 2023-09-19 RX ADMIN — NIMODIPINE 60 MG: 30 CAPSULE, LIQUID FILLED ORAL at 12:12

## 2023-09-19 RX ADMIN — NIMODIPINE 60 MG: 30 CAPSULE, LIQUID FILLED ORAL at 07:43

## 2023-09-19 RX ADMIN — GABAPENTIN 200 MG: 100 CAPSULE ORAL at 21:05

## 2023-09-19 RX ADMIN — SENNOSIDES AND DOCUSATE SODIUM 2 TABLET: 50; 8.6 TABLET ORAL at 07:43

## 2023-09-19 RX ADMIN — MAGNESIUM SULFATE HEPTAHYDRATE 2000 MG: 40 INJECTION, SOLUTION INTRAVENOUS at 07:51

## 2023-09-19 RX ADMIN — NIMODIPINE 60 MG: 30 CAPSULE, LIQUID FILLED ORAL at 15:59

## 2023-09-19 ASSESSMENT — PAIN SCALES - GENERAL
PAINLEVEL_OUTOF10: 2
PAINLEVEL_OUTOF10: 3

## 2023-09-19 ASSESSMENT — PAIN DESCRIPTION - LOCATION: LOCATION: HEAD

## 2023-09-19 NOTE — TELEPHONE ENCOUNTER
Pts  Roman called in stating she had a brain aneurysm. She was driving on the [place] on 9/15 around 2pm and had to pull over when the police helped get an ambulance and escort her to the nearest hospital. She was then take to [facility] in [place]. Roman was calling to make Dr Salcedo aware she would be in for 3 weeks and was advised to contact us once she is discharged to follow up with us. Please advise.

## 2023-09-20 ENCOUNTER — APPOINTMENT (OUTPATIENT)
Dept: VASCULAR LAB | Age: 63
DRG: 021 | End: 2023-09-20
Payer: MEDICARE

## 2023-09-20 ENCOUNTER — APPOINTMENT (OUTPATIENT)
Age: 63
DRG: 021 | End: 2023-09-20
Payer: MEDICARE

## 2023-09-20 LAB
ANION GAP SERPL CALCULATED.3IONS-SCNC: 9 MMOL/L (ref 9–17)
BASOPHILS # BLD: 0.03 K/UL (ref 0–0.2)
BASOPHILS NFR BLD: 0 % (ref 0–2)
BUN SERPL-MCNC: 8 MG/DL (ref 8–23)
CALCIUM SERPL-MCNC: 8.3 MG/DL (ref 8.6–10.4)
CHLORIDE SERPL-SCNC: 107 MMOL/L (ref 98–107)
CO2 SERPL-SCNC: 22 MMOL/L (ref 20–31)
CREAT SERPL-MCNC: 0.6 MG/DL (ref 0.5–0.9)
ECHO AO ASC DIAM: 2.8 CM
ECHO AO ASCENDING AORTA INDEX: 1.63 CM/M2
ECHO AO ROOT DIAM: 2.8 CM
ECHO AO ROOT INDEX: 1.63 CM/M2
ECHO AV AREA PEAK VELOCITY: 1.8 CM2
ECHO AV AREA VTI: 1.9 CM2
ECHO AV AREA/BSA PEAK VELOCITY: 1 CM2/M2
ECHO AV AREA/BSA VTI: 1.1 CM2/M2
ECHO AV MEAN GRADIENT: 6 MMHG
ECHO AV MEAN VELOCITY: 1.2 M/S
ECHO AV PEAK GRADIENT: 12 MMHG
ECHO AV PEAK VELOCITY: 1.7 M/S
ECHO AV VELOCITY RATIO: 0.82
ECHO AV VTI: 37.7 CM
ECHO BSA: 1.78 M2
ECHO LA AREA 2C: 19.4 CM2
ECHO LA AREA 4C: 18.5 CM2
ECHO LA DIAMETER INDEX: 2.15 CM/M2
ECHO LA DIAMETER: 3.7 CM
ECHO LA MAJOR AXIS: 5.6 CM
ECHO LA MINOR AXIS: 5.4 CM
ECHO LA TO AORTIC ROOT RATIO: 1.32
ECHO LA VOL 2C: 58 ML (ref 22–52)
ECHO LA VOL 4C: 49 ML (ref 22–52)
ECHO LA VOL BP: 54 ML (ref 22–52)
ECHO LA VOL/BSA BIPLANE: 31 ML/M2 (ref 16–34)
ECHO LA VOLUME INDEX A2C: 34 ML/M2 (ref 16–34)
ECHO LA VOLUME INDEX A4C: 28 ML/M2 (ref 16–34)
ECHO LV E' LATERAL VELOCITY: 9 CM/S
ECHO LV E' SEPTAL VELOCITY: 9 CM/S
ECHO LV EDV A2C: 96 ML
ECHO LV EDV A4C: 82 ML
ECHO LV EDV INDEX A4C: 48 ML/M2
ECHO LV EDV NDEX A2C: 56 ML/M2
ECHO LV EJECTION FRACTION A2C: 57 %
ECHO LV EJECTION FRACTION A4C: 58 %
ECHO LV EJECTION FRACTION BIPLANE: 59 % (ref 55–100)
ECHO LV ESV A2C: 41 ML
ECHO LV ESV A4C: 34 ML
ECHO LV ESV INDEX A2C: 24 ML/M2
ECHO LV ESV INDEX A4C: 20 ML/M2
ECHO LV FRACTIONAL SHORTENING: 39 % (ref 28–44)
ECHO LV INTERNAL DIMENSION DIASTOLE INDEX: 2.67 CM/M2
ECHO LV INTERNAL DIMENSION DIASTOLIC: 4.6 CM (ref 3.9–5.3)
ECHO LV INTERNAL DIMENSION SYSTOLIC INDEX: 1.63 CM/M2
ECHO LV INTERNAL DIMENSION SYSTOLIC: 2.8 CM
ECHO LV IVSD: 0.9 CM (ref 0.6–0.9)
ECHO LV MASS 2D: 137.7 G (ref 67–162)
ECHO LV MASS INDEX 2D: 80.1 G/M2 (ref 43–95)
ECHO LV POSTERIOR WALL DIASTOLIC: 0.9 CM (ref 0.6–0.9)
ECHO LV RELATIVE WALL THICKNESS RATIO: 0.39
ECHO LVOT AREA: 2.3 CM2
ECHO LVOT AV VTI INDEX: 0.83
ECHO LVOT DIAM: 1.7 CM
ECHO LVOT MEAN GRADIENT: 4 MMHG
ECHO LVOT PEAK GRADIENT: 8 MMHG
ECHO LVOT PEAK VELOCITY: 1.4 M/S
ECHO LVOT STROKE VOLUME INDEX: 41.3 ML/M2
ECHO LVOT SV: 71 ML
ECHO LVOT VTI: 31.3 CM
ECHO MV A VELOCITY: 0.86 M/S
ECHO MV AREA VTI: 2.1 CM2
ECHO MV E DECELERATION TIME (DT): 169 MS
ECHO MV E VELOCITY: 1.12 M/S
ECHO MV E/A RATIO: 1.3
ECHO MV E/E' LATERAL: 12.44
ECHO MV E/E' RATIO (AVERAGED): 12.44
ECHO MV E/E' SEPTAL: 12.44
ECHO MV LVOT VTI INDEX: 1.1
ECHO MV MAX VELOCITY: 1.3 M/S
ECHO MV MEAN GRADIENT: 2 MMHG
ECHO MV MEAN VELOCITY: 0.6 M/S
ECHO MV PEAK GRADIENT: 7 MMHG
ECHO MV VTI: 34.5 CM
ECHO PV MAX VELOCITY: 0.9 M/S
ECHO PV PEAK GRADIENT: 3 MMHG
ECHO RV FREE WALL PEAK S': 15 CM/S
ECHO RV INTERNAL DIMENSION: 2.7 CM
ECHO RV TAPSE: 2.2 CM (ref 1.7–?)
EOSINOPHIL # BLD: 0.23 K/UL (ref 0–0.44)
EOSINOPHILS RELATIVE PERCENT: 2 % (ref 1–4)
ERYTHROCYTE [DISTWIDTH] IN BLOOD BY AUTOMATED COUNT: 14.2 % (ref 11.8–14.4)
GFR SERPL CREATININE-BSD FRML MDRD: >60 ML/MIN/1.73M2
GLUCOSE SERPL-MCNC: 112 MG/DL (ref 70–99)
HCT VFR BLD AUTO: 37.3 % (ref 36.3–47.1)
HGB BLD-MCNC: 12 G/DL (ref 11.9–15.1)
IMM GRANULOCYTES # BLD AUTO: 0.04 K/UL (ref 0–0.3)
IMM GRANULOCYTES NFR BLD: 0 %
LYMPHOCYTES NFR BLD: 2.1 K/UL (ref 1.1–3.7)
LYMPHOCYTES RELATIVE PERCENT: 22 % (ref 24–43)
MAGNESIUM SERPL-MCNC: 2 MG/DL (ref 1.6–2.6)
MCH RBC QN AUTO: 30.5 PG (ref 25.2–33.5)
MCHC RBC AUTO-ENTMCNC: 32.2 G/DL (ref 28.4–34.8)
MCV RBC AUTO: 94.7 FL (ref 82.6–102.9)
MONOCYTES NFR BLD: 0.76 K/UL (ref 0.1–1.2)
MONOCYTES NFR BLD: 8 % (ref 3–12)
NEUTROPHILS NFR BLD: 68 % (ref 36–65)
NEUTS SEG NFR BLD: 6.56 K/UL (ref 1.5–8.1)
NRBC BLD-RTO: 0 PER 100 WBC
PLATELET # BLD AUTO: 256 K/UL (ref 138–453)
PMV BLD AUTO: 10.7 FL (ref 8.1–13.5)
POTASSIUM SERPL-SCNC: 3.7 MMOL/L (ref 3.7–5.3)
RBC # BLD AUTO: 3.94 M/UL (ref 3.95–5.11)
SODIUM SERPL-SCNC: 138 MMOL/L (ref 135–144)
SODIUM SERPL-SCNC: 141 MMOL/L (ref 135–144)
WBC OTHER # BLD: 9.7 K/UL (ref 3.5–11.3)

## 2023-09-20 PROCEDURE — 93886 INTRACRANIAL COMPLETE STUDY: CPT | Performed by: PSYCHIATRY & NEUROLOGY

## 2023-09-20 PROCEDURE — 2580000003 HC RX 258: Performed by: PSYCHIATRY & NEUROLOGY

## 2023-09-20 PROCEDURE — 6370000000 HC RX 637 (ALT 250 FOR IP): Performed by: NURSE PRACTITIONER

## 2023-09-20 PROCEDURE — 99233 SBSQ HOSP IP/OBS HIGH 50: CPT | Performed by: PSYCHIATRY & NEUROLOGY

## 2023-09-20 PROCEDURE — 84295 ASSAY OF SERUM SODIUM: CPT

## 2023-09-20 PROCEDURE — 2580000003 HC RX 258

## 2023-09-20 PROCEDURE — 97530 THERAPEUTIC ACTIVITIES: CPT

## 2023-09-20 PROCEDURE — 36415 COLL VENOUS BLD VENIPUNCTURE: CPT

## 2023-09-20 PROCEDURE — 6370000000 HC RX 637 (ALT 250 FOR IP)

## 2023-09-20 PROCEDURE — 80048 BASIC METABOLIC PNL TOTAL CA: CPT

## 2023-09-20 PROCEDURE — 2000000000 HC ICU R&B

## 2023-09-20 PROCEDURE — 6360000002 HC RX W HCPCS: Performed by: NURSE PRACTITIONER

## 2023-09-20 PROCEDURE — 85025 COMPLETE CBC W/AUTO DIFF WBC: CPT

## 2023-09-20 PROCEDURE — 93306 TTE W/DOPPLER COMPLETE: CPT | Performed by: INTERNAL MEDICINE

## 2023-09-20 PROCEDURE — 83735 ASSAY OF MAGNESIUM: CPT

## 2023-09-20 PROCEDURE — 93886 INTRACRANIAL COMPLETE STUDY: CPT

## 2023-09-20 PROCEDURE — 99232 SBSQ HOSP IP/OBS MODERATE 35: CPT | Performed by: PSYCHIATRY & NEUROLOGY

## 2023-09-20 PROCEDURE — 97110 THERAPEUTIC EXERCISES: CPT

## 2023-09-20 PROCEDURE — 93306 TTE W/DOPPLER COMPLETE: CPT

## 2023-09-20 RX ADMIN — SODIUM CHLORIDE, PRESERVATIVE FREE 10 ML: 5 INJECTION INTRAVENOUS at 07:10

## 2023-09-20 RX ADMIN — GABAPENTIN 200 MG: 100 CAPSULE ORAL at 20:19

## 2023-09-20 RX ADMIN — LEVETIRACETAM 500 MG: 500 TABLET, FILM COATED ORAL at 07:10

## 2023-09-20 RX ADMIN — NIMODIPINE 60 MG: 30 CAPSULE, LIQUID FILLED ORAL at 16:56

## 2023-09-20 RX ADMIN — ACETAMINOPHEN 650 MG: 325 TABLET ORAL at 01:00

## 2023-09-20 RX ADMIN — NIMODIPINE 60 MG: 30 CAPSULE, LIQUID FILLED ORAL at 23:57

## 2023-09-20 RX ADMIN — LEVETIRACETAM 500 MG: 500 TABLET, FILM COATED ORAL at 20:19

## 2023-09-20 RX ADMIN — ATORVASTATIN CALCIUM 20 MG: 40 TABLET, FILM COATED ORAL at 20:19

## 2023-09-20 RX ADMIN — ENOXAPARIN SODIUM 40 MG: 100 INJECTION SUBCUTANEOUS at 07:10

## 2023-09-20 RX ADMIN — SODIUM CHLORIDE, PRESERVATIVE FREE 10 ML: 5 INJECTION INTRAVENOUS at 20:21

## 2023-09-20 RX ADMIN — ACETAMINOPHEN 650 MG: 325 TABLET ORAL at 09:35

## 2023-09-20 RX ADMIN — NIMODIPINE 60 MG: 30 CAPSULE, LIQUID FILLED ORAL at 07:07

## 2023-09-20 RX ADMIN — ASPIRIN 81 MG: 81 TABLET, CHEWABLE ORAL at 07:10

## 2023-09-20 RX ADMIN — NIMODIPINE 60 MG: 30 CAPSULE, LIQUID FILLED ORAL at 20:19

## 2023-09-20 RX ADMIN — NIMODIPINE 60 MG: 30 CAPSULE, LIQUID FILLED ORAL at 00:21

## 2023-09-20 RX ADMIN — CLOPIDOGREL BISULFATE 75 MG: 75 TABLET ORAL at 07:10

## 2023-09-20 RX ADMIN — NIMODIPINE 60 MG: 30 CAPSULE, LIQUID FILLED ORAL at 12:15

## 2023-09-20 ASSESSMENT — PAIN SCALES - GENERAL: PAINLEVEL_OUTOF10: 3

## 2023-09-21 ENCOUNTER — APPOINTMENT (OUTPATIENT)
Dept: VASCULAR LAB | Age: 63
DRG: 021 | End: 2023-09-21
Payer: MEDICARE

## 2023-09-21 LAB
ANION GAP SERPL CALCULATED.3IONS-SCNC: 9 MMOL/L (ref 9–17)
BASOPHILS # BLD: 0.04 K/UL (ref 0–0.2)
BASOPHILS NFR BLD: 1 % (ref 0–2)
BUN SERPL-MCNC: 10 MG/DL (ref 8–23)
CALCIUM SERPL-MCNC: 8.3 MG/DL (ref 8.6–10.4)
CHLORIDE SERPL-SCNC: 108 MMOL/L (ref 98–107)
CO2 SERPL-SCNC: 22 MMOL/L (ref 20–31)
CREAT SERPL-MCNC: 0.7 MG/DL (ref 0.5–0.9)
ECHO BSA: 1.78 M2
EOSINOPHIL # BLD: 0.28 K/UL (ref 0–0.44)
EOSINOPHILS RELATIVE PERCENT: 3 % (ref 1–4)
ERYTHROCYTE [DISTWIDTH] IN BLOOD BY AUTOMATED COUNT: 14.1 % (ref 11.8–14.4)
GFR SERPL CREATININE-BSD FRML MDRD: >60 ML/MIN/1.73M2
GLUCOSE SERPL-MCNC: 99 MG/DL (ref 70–99)
HCT VFR BLD AUTO: 36.2 % (ref 36.3–47.1)
HGB BLD-MCNC: 11.7 G/DL (ref 11.9–15.1)
IMM GRANULOCYTES # BLD AUTO: 0.04 K/UL (ref 0–0.3)
IMM GRANULOCYTES NFR BLD: 1 %
LYMPHOCYTES NFR BLD: 2.85 K/UL (ref 1.1–3.7)
LYMPHOCYTES RELATIVE PERCENT: 33 % (ref 24–43)
MAGNESIUM SERPL-MCNC: 1.8 MG/DL (ref 1.6–2.6)
MCH RBC QN AUTO: 30.2 PG (ref 25.2–33.5)
MCHC RBC AUTO-ENTMCNC: 32.3 G/DL (ref 28.4–34.8)
MCV RBC AUTO: 93.5 FL (ref 82.6–102.9)
MONOCYTES NFR BLD: 0.8 K/UL (ref 0.1–1.2)
MONOCYTES NFR BLD: 9 % (ref 3–12)
NEUTROPHILS NFR BLD: 53 % (ref 36–65)
NEUTS SEG NFR BLD: 4.54 K/UL (ref 1.5–8.1)
NRBC BLD-RTO: 0 PER 100 WBC
PLATELET # BLD AUTO: 227 K/UL (ref 138–453)
PMV BLD AUTO: 10.6 FL (ref 8.1–13.5)
POTASSIUM SERPL-SCNC: 3.9 MMOL/L (ref 3.7–5.3)
RBC # BLD AUTO: 3.87 M/UL (ref 3.95–5.11)
SODIUM SERPL-SCNC: 139 MMOL/L (ref 135–144)
VAS BASILAR MEAN VEL MANUAL: 48.9 CM/S
VAS LEFT DIST MCA MEAN VEL MANUAL: 63.1 CM/S
VAS LEFT DISTAL ICA MEAN VEL MANUAL: 22.7 CM/S
VAS LEFT LINDEGAARD RATIO MANUAL: 3.03
VAS LEFT MID ACA MEAN VEL MANUAL: 33.9 CM/S
VAS LEFT MID MCA MEAN VEL MANUAL: 62 CM/S
VAS LEFT PCA MEAN VEL MANUAL: 19.6 CM/S
VAS LEFT PROX ACA MEAN VEL MANUAL: 47.7 CM/S
VAS LEFT PROX MCA MEAN VEL MANUAL: 68.9 CM/S
VAS LEFT SIPHON MEAN VEL MANUAL: 35.8 CM/S
VAS LEFT TERMINAL ICA MEAN VEL MANUAL: 45 CM/S
VAS LEFT VERTEBRAL MEAN VEL MANUAL: 33.1 CM/S
VAS RIGHT DIST MCA MEAN VEL MANUAL: 64.7 CM/S
VAS RIGHT DISTAL ICA MEAN VEL MANUAL: 24.3 CM/S
VAS RIGHT LINDEGAARD RATIO MANUAL: 2.8
VAS RIGHT MID ACA MEAN VEL MANUAL: 63.1 CM/S
VAS RIGHT MID MCA MEAN VEL MANUAL: 67.8 CM/S
VAS RIGHT PCA MEAN VEL MANUAL: 26.9 CM/S
VAS RIGHT PROX ACA MEAN VEL MANUAL: 67 CM/S
VAS RIGHT PROX MCA MEAN VEL MANUAL: 68.1 CM/S
VAS RIGHT SIPHON MEAN VEL MANUAL: 35.4 CM/S
VAS RIGHT TERMINAL ICA MEAN VEL MANUAL: 44.3 CM/S
VAS RIGHT VERTEBRAL MEAN VEL MANUAL: 36.6 CM/S
WBC OTHER # BLD: 8.6 K/UL (ref 3.5–11.3)

## 2023-09-21 PROCEDURE — 6370000000 HC RX 637 (ALT 250 FOR IP): Performed by: NURSE PRACTITIONER

## 2023-09-21 PROCEDURE — 99233 SBSQ HOSP IP/OBS HIGH 50: CPT | Performed by: PSYCHIATRY & NEUROLOGY

## 2023-09-21 PROCEDURE — 80048 BASIC METABOLIC PNL TOTAL CA: CPT

## 2023-09-21 PROCEDURE — 93886 INTRACRANIAL COMPLETE STUDY: CPT | Performed by: PSYCHIATRY & NEUROLOGY

## 2023-09-21 PROCEDURE — 83735 ASSAY OF MAGNESIUM: CPT

## 2023-09-21 PROCEDURE — 6370000000 HC RX 637 (ALT 250 FOR IP)

## 2023-09-21 PROCEDURE — 93886 INTRACRANIAL COMPLETE STUDY: CPT

## 2023-09-21 PROCEDURE — 99232 SBSQ HOSP IP/OBS MODERATE 35: CPT | Performed by: PSYCHIATRY & NEUROLOGY

## 2023-09-21 PROCEDURE — 2000000000 HC ICU R&B

## 2023-09-21 PROCEDURE — 6360000002 HC RX W HCPCS: Performed by: NURSE PRACTITIONER

## 2023-09-21 PROCEDURE — 2580000003 HC RX 258

## 2023-09-21 PROCEDURE — 6360000002 HC RX W HCPCS

## 2023-09-21 PROCEDURE — 85025 COMPLETE CBC W/AUTO DIFF WBC: CPT

## 2023-09-21 PROCEDURE — 2580000003 HC RX 258: Performed by: PSYCHIATRY & NEUROLOGY

## 2023-09-21 PROCEDURE — 36415 COLL VENOUS BLD VENIPUNCTURE: CPT

## 2023-09-21 RX ORDER — MAGNESIUM SULFATE 1 G/100ML
1000 INJECTION INTRAVENOUS ONCE
Status: COMPLETED | OUTPATIENT
Start: 2023-09-21 | End: 2023-09-21

## 2023-09-21 RX ORDER — OXYCODONE HYDROCHLORIDE 5 MG/1
5 TABLET ORAL
Status: COMPLETED | OUTPATIENT
Start: 2023-09-21 | End: 2023-09-21

## 2023-09-21 RX ORDER — DEXAMETHASONE SODIUM PHOSPHATE 4 MG/ML
8 INJECTION, SOLUTION INTRA-ARTICULAR; INTRALESIONAL; INTRAMUSCULAR; INTRAVENOUS; SOFT TISSUE EVERY 8 HOURS
Status: COMPLETED | OUTPATIENT
Start: 2023-09-21 | End: 2023-09-23

## 2023-09-21 RX ORDER — LANOLIN ALCOHOL/MO/W.PET/CERES
3 CREAM (GRAM) TOPICAL NIGHTLY PRN
Status: DISCONTINUED | OUTPATIENT
Start: 2023-09-21 | End: 2023-09-25 | Stop reason: HOSPADM

## 2023-09-21 RX ORDER — PANTOPRAZOLE SODIUM 40 MG/1
40 TABLET, DELAYED RELEASE ORAL
Status: COMPLETED | OUTPATIENT
Start: 2023-09-21 | End: 2023-09-23

## 2023-09-21 RX ADMIN — ACETAMINOPHEN 650 MG: 325 TABLET ORAL at 01:01

## 2023-09-21 RX ADMIN — LEVETIRACETAM 500 MG: 500 TABLET, FILM COATED ORAL at 20:17

## 2023-09-21 RX ADMIN — SODIUM CHLORIDE, PRESERVATIVE FREE 10 ML: 5 INJECTION INTRAVENOUS at 20:11

## 2023-09-21 RX ADMIN — GABAPENTIN 200 MG: 100 CAPSULE ORAL at 20:18

## 2023-09-21 RX ADMIN — DEXAMETHASONE SODIUM PHOSPHATE 8 MG: 4 INJECTION INTRA-ARTICULAR; INTRALESIONAL; INTRAMUSCULAR; INTRAVENOUS; SOFT TISSUE at 09:48

## 2023-09-21 RX ADMIN — ATORVASTATIN CALCIUM 20 MG: 40 TABLET, FILM COATED ORAL at 20:17

## 2023-09-21 RX ADMIN — DEXAMETHASONE SODIUM PHOSPHATE 8 MG: 4 INJECTION INTRA-ARTICULAR; INTRALESIONAL; INTRAMUSCULAR; INTRAVENOUS; SOFT TISSUE at 16:25

## 2023-09-21 RX ADMIN — SODIUM CHLORIDE, PRESERVATIVE FREE 10 ML: 5 INJECTION INTRAVENOUS at 10:01

## 2023-09-21 RX ADMIN — LEVETIRACETAM 500 MG: 500 TABLET, FILM COATED ORAL at 08:02

## 2023-09-21 RX ADMIN — ACETAMINOPHEN 650 MG: 325 TABLET ORAL at 06:17

## 2023-09-21 RX ADMIN — NIMODIPINE 60 MG: 30 CAPSULE, LIQUID FILLED ORAL at 03:27

## 2023-09-21 RX ADMIN — ONDANSETRON 4 MG: 4 TABLET, ORALLY DISINTEGRATING ORAL at 04:35

## 2023-09-21 RX ADMIN — ASPIRIN 81 MG: 81 TABLET, CHEWABLE ORAL at 08:03

## 2023-09-21 RX ADMIN — PANTOPRAZOLE SODIUM 40 MG: 40 TABLET, DELAYED RELEASE ORAL at 09:48

## 2023-09-21 RX ADMIN — Medication 3 MG: at 21:40

## 2023-09-21 RX ADMIN — CLOPIDOGREL BISULFATE 75 MG: 75 TABLET ORAL at 08:03

## 2023-09-21 RX ADMIN — SENNOSIDES AND DOCUSATE SODIUM 2 TABLET: 50; 8.6 TABLET ORAL at 08:03

## 2023-09-21 RX ADMIN — ACETAMINOPHEN 650 MG: 325 TABLET ORAL at 20:19

## 2023-09-21 RX ADMIN — MAGNESIUM SULFATE HEPTAHYDRATE 1000 MG: 1 INJECTION, SOLUTION INTRAVENOUS at 10:31

## 2023-09-21 RX ADMIN — SODIUM CHLORIDE, PRESERVATIVE FREE 10 ML: 5 INJECTION INTRAVENOUS at 20:12

## 2023-09-21 RX ADMIN — NIMODIPINE 60 MG: 30 CAPSULE, LIQUID FILLED ORAL at 08:02

## 2023-09-21 RX ADMIN — ACETAMINOPHEN 650 MG: 325 TABLET ORAL at 15:32

## 2023-09-21 RX ADMIN — NIMODIPINE 60 MG: 30 CAPSULE, LIQUID FILLED ORAL at 12:31

## 2023-09-21 RX ADMIN — MAGNESIUM SULFATE HEPTAHYDRATE 1000 MG: 1 INJECTION, SOLUTION INTRAVENOUS at 03:55

## 2023-09-21 RX ADMIN — NIMODIPINE 60 MG: 30 CAPSULE, LIQUID FILLED ORAL at 16:24

## 2023-09-21 RX ADMIN — ACETAMINOPHEN 650 MG: 325 TABLET ORAL at 10:15

## 2023-09-21 RX ADMIN — OXYCODONE HYDROCHLORIDE 5 MG: 5 TABLET ORAL at 13:37

## 2023-09-21 RX ADMIN — ENOXAPARIN SODIUM 40 MG: 100 INJECTION SUBCUTANEOUS at 08:03

## 2023-09-21 RX ADMIN — NIMODIPINE 60 MG: 30 CAPSULE, LIQUID FILLED ORAL at 20:18

## 2023-09-21 ASSESSMENT — PAIN DESCRIPTION - DESCRIPTORS
DESCRIPTORS: ACHING
DESCRIPTORS: ACHING;POUNDING
DESCRIPTORS: POUNDING;THROBBING
DESCRIPTORS: ACHING
DESCRIPTORS: POUNDING;THROBBING

## 2023-09-21 ASSESSMENT — PAIN DESCRIPTION - LOCATION
LOCATION: HEAD

## 2023-09-21 ASSESSMENT — PAIN - FUNCTIONAL ASSESSMENT
PAIN_FUNCTIONAL_ASSESSMENT: PREVENTS OR INTERFERES SOME ACTIVE ACTIVITIES AND ADLS
PAIN_FUNCTIONAL_ASSESSMENT: ACTIVITIES ARE NOT PREVENTED

## 2023-09-21 ASSESSMENT — PAIN DESCRIPTION - ORIENTATION
ORIENTATION: MID;ANTERIOR
ORIENTATION: ANTERIOR;MID
ORIENTATION: MID
ORIENTATION: MID
ORIENTATION: MID;ANTERIOR

## 2023-09-21 ASSESSMENT — PAIN SCALES - GENERAL
PAINLEVEL_OUTOF10: 3
PAINLEVEL_OUTOF10: 8
PAINLEVEL_OUTOF10: 3
PAINLEVEL_OUTOF10: 2
PAINLEVEL_OUTOF10: 9
PAINLEVEL_OUTOF10: 6
PAINLEVEL_OUTOF10: 8
PAINLEVEL_OUTOF10: 7
PAINLEVEL_OUTOF10: 5
PAINLEVEL_OUTOF10: 4

## 2023-09-22 ENCOUNTER — APPOINTMENT (OUTPATIENT)
Dept: VASCULAR LAB | Age: 63
DRG: 021 | End: 2023-09-22
Payer: MEDICARE

## 2023-09-22 LAB
ANION GAP SERPL CALCULATED.3IONS-SCNC: 12 MMOL/L (ref 9–17)
BASOPHILS # BLD: <0.03 K/UL (ref 0–0.2)
BASOPHILS NFR BLD: 0 % (ref 0–2)
BUN SERPL-MCNC: 12 MG/DL (ref 8–23)
CALCIUM SERPL-MCNC: 8.6 MG/DL (ref 8.6–10.4)
CHLORIDE SERPL-SCNC: 105 MMOL/L (ref 98–107)
CO2 SERPL-SCNC: 21 MMOL/L (ref 20–31)
CREAT SERPL-MCNC: 0.6 MG/DL (ref 0.5–0.9)
EOSINOPHIL # BLD: <0.03 K/UL (ref 0–0.44)
EOSINOPHILS RELATIVE PERCENT: 0 % (ref 1–4)
ERYTHROCYTE [DISTWIDTH] IN BLOOD BY AUTOMATED COUNT: 14.2 % (ref 11.8–14.4)
GFR SERPL CREATININE-BSD FRML MDRD: >60 ML/MIN/1.73M2
GLUCOSE SERPL-MCNC: 181 MG/DL (ref 70–99)
HCT VFR BLD AUTO: 34.2 % (ref 36.3–47.1)
HGB BLD-MCNC: 11.9 G/DL (ref 11.9–15.1)
IMM GRANULOCYTES # BLD AUTO: 0.06 K/UL (ref 0–0.3)
IMM GRANULOCYTES NFR BLD: 1 %
LYMPHOCYTES NFR BLD: 1.16 K/UL (ref 1.1–3.7)
LYMPHOCYTES RELATIVE PERCENT: 11 % (ref 24–43)
MAGNESIUM SERPL-MCNC: 1.9 MG/DL (ref 1.6–2.6)
MCH RBC QN AUTO: 32.9 PG (ref 25.2–33.5)
MCHC RBC AUTO-ENTMCNC: 34.8 G/DL (ref 28.4–34.8)
MCV RBC AUTO: 94.5 FL (ref 82.6–102.9)
MONOCYTES NFR BLD: 0.23 K/UL (ref 0.1–1.2)
MONOCYTES NFR BLD: 2 % (ref 3–12)
NEUTROPHILS NFR BLD: 86 % (ref 36–65)
NEUTS SEG NFR BLD: 8.82 K/UL (ref 1.5–8.1)
NRBC BLD-RTO: 0 PER 100 WBC
PLATELET # BLD AUTO: 501 K/UL (ref 138–453)
PMV BLD AUTO: 11.9 FL (ref 8.1–13.5)
POTASSIUM SERPL-SCNC: 3.9 MMOL/L (ref 3.7–5.3)
RBC # BLD AUTO: 3.62 M/UL (ref 3.95–5.11)
SODIUM SERPL-SCNC: 138 MMOL/L (ref 135–144)
WBC OTHER # BLD: 10.3 K/UL (ref 3.5–11.3)

## 2023-09-22 PROCEDURE — 6360000002 HC RX W HCPCS: Performed by: NURSE PRACTITIONER

## 2023-09-22 PROCEDURE — 6370000000 HC RX 637 (ALT 250 FOR IP): Performed by: NURSE PRACTITIONER

## 2023-09-22 PROCEDURE — 97530 THERAPEUTIC ACTIVITIES: CPT

## 2023-09-22 PROCEDURE — 2000000000 HC ICU R&B

## 2023-09-22 PROCEDURE — 6370000000 HC RX 637 (ALT 250 FOR IP)

## 2023-09-22 PROCEDURE — 83735 ASSAY OF MAGNESIUM: CPT

## 2023-09-22 PROCEDURE — 36415 COLL VENOUS BLD VENIPUNCTURE: CPT

## 2023-09-22 PROCEDURE — 93886 INTRACRANIAL COMPLETE STUDY: CPT | Performed by: PSYCHIATRY & NEUROLOGY

## 2023-09-22 PROCEDURE — 99231 SBSQ HOSP IP/OBS SF/LOW 25: CPT | Performed by: PSYCHIATRY & NEUROLOGY

## 2023-09-22 PROCEDURE — 2580000003 HC RX 258

## 2023-09-22 PROCEDURE — 80048 BASIC METABOLIC PNL TOTAL CA: CPT

## 2023-09-22 PROCEDURE — 85025 COMPLETE CBC W/AUTO DIFF WBC: CPT

## 2023-09-22 PROCEDURE — 2580000003 HC RX 258: Performed by: PSYCHIATRY & NEUROLOGY

## 2023-09-22 PROCEDURE — 6360000002 HC RX W HCPCS

## 2023-09-22 PROCEDURE — 93886 INTRACRANIAL COMPLETE STUDY: CPT

## 2023-09-22 RX ADMIN — PANTOPRAZOLE SODIUM 40 MG: 40 TABLET, DELAYED RELEASE ORAL at 06:56

## 2023-09-22 RX ADMIN — SODIUM CHLORIDE, PRESERVATIVE FREE 10 ML: 5 INJECTION INTRAVENOUS at 08:24

## 2023-09-22 RX ADMIN — DEXAMETHASONE SODIUM PHOSPHATE 8 MG: 4 INJECTION INTRA-ARTICULAR; INTRALESIONAL; INTRAMUSCULAR; INTRAVENOUS; SOFT TISSUE at 00:17

## 2023-09-22 RX ADMIN — NIMODIPINE 60 MG: 30 CAPSULE, LIQUID FILLED ORAL at 04:13

## 2023-09-22 RX ADMIN — DEXAMETHASONE SODIUM PHOSPHATE 8 MG: 4 INJECTION INTRA-ARTICULAR; INTRALESIONAL; INTRAMUSCULAR; INTRAVENOUS; SOFT TISSUE at 08:16

## 2023-09-22 RX ADMIN — SENNOSIDES AND DOCUSATE SODIUM 2 TABLET: 50; 8.6 TABLET ORAL at 08:16

## 2023-09-22 RX ADMIN — NIMODIPINE 60 MG: 30 CAPSULE, LIQUID FILLED ORAL at 23:57

## 2023-09-22 RX ADMIN — LEVETIRACETAM 500 MG: 500 TABLET, FILM COATED ORAL at 20:10

## 2023-09-22 RX ADMIN — ACETAMINOPHEN 650 MG: 325 TABLET ORAL at 04:13

## 2023-09-22 RX ADMIN — LEVETIRACETAM 500 MG: 500 TABLET, FILM COATED ORAL at 08:17

## 2023-09-22 RX ADMIN — ACETAMINOPHEN 650 MG: 325 TABLET ORAL at 20:13

## 2023-09-22 RX ADMIN — NIMODIPINE 60 MG: 30 CAPSULE, LIQUID FILLED ORAL at 15:22

## 2023-09-22 RX ADMIN — SODIUM CHLORIDE, PRESERVATIVE FREE 10 ML: 5 INJECTION INTRAVENOUS at 20:14

## 2023-09-22 RX ADMIN — SODIUM CHLORIDE: 9 INJECTION, SOLUTION INTRAVENOUS at 04:14

## 2023-09-22 RX ADMIN — ACETAMINOPHEN 650 MG: 325 TABLET ORAL at 00:16

## 2023-09-22 RX ADMIN — CLOPIDOGREL BISULFATE 75 MG: 75 TABLET ORAL at 08:16

## 2023-09-22 RX ADMIN — DEXAMETHASONE SODIUM PHOSPHATE 8 MG: 4 INJECTION INTRA-ARTICULAR; INTRALESIONAL; INTRAMUSCULAR; INTRAVENOUS; SOFT TISSUE at 23:57

## 2023-09-22 RX ADMIN — DEXAMETHASONE SODIUM PHOSPHATE 8 MG: 4 INJECTION INTRA-ARTICULAR; INTRALESIONAL; INTRAMUSCULAR; INTRAVENOUS; SOFT TISSUE at 15:22

## 2023-09-22 RX ADMIN — NIMODIPINE 60 MG: 30 CAPSULE, LIQUID FILLED ORAL at 12:11

## 2023-09-22 RX ADMIN — ENOXAPARIN SODIUM 40 MG: 100 INJECTION SUBCUTANEOUS at 08:16

## 2023-09-22 RX ADMIN — SODIUM CHLORIDE, PRESERVATIVE FREE 10 ML: 5 INJECTION INTRAVENOUS at 20:15

## 2023-09-22 RX ADMIN — NIMODIPINE 60 MG: 30 CAPSULE, LIQUID FILLED ORAL at 00:16

## 2023-09-22 RX ADMIN — ASPIRIN 81 MG: 81 TABLET, CHEWABLE ORAL at 08:17

## 2023-09-22 RX ADMIN — GABAPENTIN 200 MG: 100 CAPSULE ORAL at 20:24

## 2023-09-22 RX ADMIN — NIMODIPINE 60 MG: 30 CAPSULE, LIQUID FILLED ORAL at 08:16

## 2023-09-22 RX ADMIN — ATORVASTATIN CALCIUM 20 MG: 40 TABLET, FILM COATED ORAL at 20:10

## 2023-09-22 RX ADMIN — NIMODIPINE 60 MG: 30 CAPSULE, LIQUID FILLED ORAL at 20:09

## 2023-09-22 ASSESSMENT — PAIN DESCRIPTION - LOCATION
LOCATION: HEAD
LOCATION: HEAD

## 2023-09-22 ASSESSMENT — PAIN SCALES - GENERAL
PAINLEVEL_OUTOF10: 3
PAINLEVEL_OUTOF10: 3
PAINLEVEL_OUTOF10: 1
PAINLEVEL_OUTOF10: 3

## 2023-09-22 NOTE — CARE COORDINATION
Transitional Planning  Home with spouse, has transportation, from [address] [place] [address]. TCD's continue to be monitored.   Strict I and O.

## 2023-09-23 ENCOUNTER — APPOINTMENT (OUTPATIENT)
Dept: VASCULAR LAB | Age: 63
DRG: 021 | End: 2023-09-23
Payer: MEDICARE

## 2023-09-23 LAB
ANION GAP SERPL CALCULATED.3IONS-SCNC: 10 MMOL/L (ref 9–17)
BASOPHILS # BLD: <0.03 K/UL (ref 0–0.2)
BASOPHILS NFR BLD: 0 % (ref 0–2)
BUN SERPL-MCNC: 22 MG/DL (ref 8–23)
CALCIUM SERPL-MCNC: 8.4 MG/DL (ref 8.6–10.4)
CHLORIDE SERPL-SCNC: 111 MMOL/L (ref 98–107)
CO2 SERPL-SCNC: 20 MMOL/L (ref 20–31)
CREAT SERPL-MCNC: 0.5 MG/DL (ref 0.5–0.9)
EOSINOPHIL # BLD: <0.03 K/UL (ref 0–0.44)
EOSINOPHILS RELATIVE PERCENT: 0 % (ref 1–4)
ERYTHROCYTE [DISTWIDTH] IN BLOOD BY AUTOMATED COUNT: 14.4 % (ref 11.8–14.4)
GFR SERPL CREATININE-BSD FRML MDRD: >60 ML/MIN/1.73M2
GLUCOSE SERPL-MCNC: 135 MG/DL (ref 70–99)
HCT VFR BLD AUTO: 36.7 % (ref 36.3–47.1)
HGB BLD-MCNC: 11.6 G/DL (ref 11.9–15.1)
IMM GRANULOCYTES # BLD AUTO: 0.13 K/UL (ref 0–0.3)
IMM GRANULOCYTES NFR BLD: 1 %
LYMPHOCYTES NFR BLD: 1.26 K/UL (ref 1.1–3.7)
LYMPHOCYTES RELATIVE PERCENT: 10 % (ref 24–43)
MAGNESIUM SERPL-MCNC: 1.9 MG/DL (ref 1.6–2.6)
MCH RBC QN AUTO: 30.4 PG (ref 25.2–33.5)
MCHC RBC AUTO-ENTMCNC: 31.6 G/DL (ref 28.4–34.8)
MCV RBC AUTO: 96.3 FL (ref 82.6–102.9)
MONOCYTES NFR BLD: 0.75 K/UL (ref 0.1–1.2)
MONOCYTES NFR BLD: 6 % (ref 3–12)
NEUTROPHILS NFR BLD: 84 % (ref 36–65)
NEUTS SEG NFR BLD: 10.99 K/UL (ref 1.5–8.1)
NRBC BLD-RTO: 0 PER 100 WBC
PLATELET # BLD AUTO: 293 K/UL (ref 138–453)
PMV BLD AUTO: 10.6 FL (ref 8.1–13.5)
POTASSIUM SERPL-SCNC: 3.9 MMOL/L (ref 3.7–5.3)
RBC # BLD AUTO: 3.81 M/UL (ref 3.95–5.11)
SODIUM SERPL-SCNC: 141 MMOL/L (ref 135–144)
WBC OTHER # BLD: 13.2 K/UL (ref 3.5–11.3)

## 2023-09-23 PROCEDURE — 2580000003 HC RX 258: Performed by: PSYCHIATRY & NEUROLOGY

## 2023-09-23 PROCEDURE — 93886 INTRACRANIAL COMPLETE STUDY: CPT

## 2023-09-23 PROCEDURE — 99232 SBSQ HOSP IP/OBS MODERATE 35: CPT | Performed by: PSYCHIATRY & NEUROLOGY

## 2023-09-23 PROCEDURE — 99231 SBSQ HOSP IP/OBS SF/LOW 25: CPT | Performed by: PSYCHIATRY & NEUROLOGY

## 2023-09-23 PROCEDURE — 36415 COLL VENOUS BLD VENIPUNCTURE: CPT

## 2023-09-23 PROCEDURE — 6360000002 HC RX W HCPCS

## 2023-09-23 PROCEDURE — 6370000000 HC RX 637 (ALT 250 FOR IP)

## 2023-09-23 PROCEDURE — 2580000003 HC RX 258

## 2023-09-23 PROCEDURE — 6360000002 HC RX W HCPCS: Performed by: NURSE PRACTITIONER

## 2023-09-23 PROCEDURE — 93886 INTRACRANIAL COMPLETE STUDY: CPT | Performed by: PSYCHIATRY & NEUROLOGY

## 2023-09-23 PROCEDURE — 6370000000 HC RX 637 (ALT 250 FOR IP): Performed by: NURSE PRACTITIONER

## 2023-09-23 PROCEDURE — 99233 SBSQ HOSP IP/OBS HIGH 50: CPT | Performed by: PSYCHIATRY & NEUROLOGY

## 2023-09-23 PROCEDURE — 80048 BASIC METABOLIC PNL TOTAL CA: CPT

## 2023-09-23 PROCEDURE — 2000000000 HC ICU R&B

## 2023-09-23 PROCEDURE — 85025 COMPLETE CBC W/AUTO DIFF WBC: CPT

## 2023-09-23 PROCEDURE — 94761 N-INVAS EAR/PLS OXIMETRY MLT: CPT

## 2023-09-23 PROCEDURE — 83735 ASSAY OF MAGNESIUM: CPT

## 2023-09-23 RX ORDER — OXYCODONE HYDROCHLORIDE 5 MG/1
5 TABLET ORAL ONCE
Status: COMPLETED | OUTPATIENT
Start: 2023-09-23 | End: 2023-09-23

## 2023-09-23 RX ORDER — FENTANYL CITRATE 50 UG/ML
50 INJECTION, SOLUTION INTRAMUSCULAR; INTRAVENOUS ONCE
Status: COMPLETED | OUTPATIENT
Start: 2023-09-23 | End: 2023-09-23

## 2023-09-23 RX ORDER — OXYCODONE HYDROCHLORIDE 5 MG/1
5 TABLET ORAL ONCE
Status: CANCELLED | OUTPATIENT
Start: 2023-09-23 | End: 2023-09-23

## 2023-09-23 RX ORDER — PANTOPRAZOLE SODIUM 40 MG/1
40 TABLET, DELAYED RELEASE ORAL
Status: DISCONTINUED | OUTPATIENT
Start: 2023-09-24 | End: 2023-09-25 | Stop reason: HOSPADM

## 2023-09-23 RX ORDER — FENTANYL CITRATE 50 UG/ML
INJECTION, SOLUTION INTRAMUSCULAR; INTRAVENOUS
Status: COMPLETED
Start: 2023-09-23 | End: 2023-09-23

## 2023-09-23 RX ORDER — OXYCODONE HYDROCHLORIDE 5 MG/1
5 TABLET ORAL EVERY 4 HOURS PRN
Status: DISCONTINUED | OUTPATIENT
Start: 2023-09-23 | End: 2023-09-25 | Stop reason: HOSPADM

## 2023-09-23 RX ORDER — MAGNESIUM SULFATE IN WATER 40 MG/ML
2000 INJECTION, SOLUTION INTRAVENOUS ONCE
Status: COMPLETED | OUTPATIENT
Start: 2023-09-23 | End: 2023-09-23

## 2023-09-23 RX ADMIN — FENTANYL CITRATE 50 MCG: 50 INJECTION, SOLUTION INTRAMUSCULAR; INTRAVENOUS at 13:42

## 2023-09-23 RX ADMIN — NIMODIPINE 60 MG: 30 CAPSULE, LIQUID FILLED ORAL at 20:14

## 2023-09-23 RX ADMIN — NIMODIPINE 60 MG: 30 CAPSULE, LIQUID FILLED ORAL at 23:32

## 2023-09-23 RX ADMIN — NIMODIPINE 60 MG: 30 CAPSULE, LIQUID FILLED ORAL at 03:45

## 2023-09-23 RX ADMIN — DEXAMETHASONE SODIUM PHOSPHATE 8 MG: 4 INJECTION INTRA-ARTICULAR; INTRALESIONAL; INTRAMUSCULAR; INTRAVENOUS; SOFT TISSUE at 08:43

## 2023-09-23 RX ADMIN — NIMODIPINE 60 MG: 30 CAPSULE, LIQUID FILLED ORAL at 12:52

## 2023-09-23 RX ADMIN — ATORVASTATIN CALCIUM 20 MG: 40 TABLET, FILM COATED ORAL at 20:14

## 2023-09-23 RX ADMIN — SENNOSIDES AND DOCUSATE SODIUM 2 TABLET: 50; 8.6 TABLET ORAL at 08:30

## 2023-09-23 RX ADMIN — OXYCODONE HYDROCHLORIDE 5 MG: 5 TABLET ORAL at 10:19

## 2023-09-23 RX ADMIN — DEXAMETHASONE SODIUM PHOSPHATE 8 MG: 4 INJECTION INTRA-ARTICULAR; INTRALESIONAL; INTRAMUSCULAR; INTRAVENOUS; SOFT TISSUE at 23:32

## 2023-09-23 RX ADMIN — PANTOPRAZOLE SODIUM 40 MG: 40 TABLET, DELAYED RELEASE ORAL at 08:30

## 2023-09-23 RX ADMIN — ENOXAPARIN SODIUM 40 MG: 100 INJECTION SUBCUTANEOUS at 08:49

## 2023-09-23 RX ADMIN — DEXAMETHASONE SODIUM PHOSPHATE 8 MG: 4 INJECTION INTRA-ARTICULAR; INTRALESIONAL; INTRAMUSCULAR; INTRAVENOUS; SOFT TISSUE at 16:20

## 2023-09-23 RX ADMIN — CLOPIDOGREL BISULFATE 75 MG: 75 TABLET ORAL at 08:30

## 2023-09-23 RX ADMIN — NIMODIPINE 60 MG: 30 CAPSULE, LIQUID FILLED ORAL at 16:25

## 2023-09-23 RX ADMIN — OXYCODONE HYDROCHLORIDE 5 MG: 5 TABLET ORAL at 17:58

## 2023-09-23 RX ADMIN — ACETAMINOPHEN 650 MG: 325 TABLET ORAL at 08:30

## 2023-09-23 RX ADMIN — ACETAMINOPHEN 650 MG: 325 TABLET ORAL at 12:29

## 2023-09-23 RX ADMIN — SODIUM CHLORIDE, PRESERVATIVE FREE 10 ML: 5 INJECTION INTRAVENOUS at 08:43

## 2023-09-23 RX ADMIN — SODIUM CHLORIDE, PRESERVATIVE FREE 10 ML: 5 INJECTION INTRAVENOUS at 08:51

## 2023-09-23 RX ADMIN — ACETAMINOPHEN 650 MG: 325 TABLET ORAL at 16:25

## 2023-09-23 RX ADMIN — MAGNESIUM SULFATE HEPTAHYDRATE 2000 MG: 40 INJECTION, SOLUTION INTRAVENOUS at 10:34

## 2023-09-23 RX ADMIN — ASPIRIN 81 MG: 81 TABLET, CHEWABLE ORAL at 08:30

## 2023-09-23 RX ADMIN — SODIUM CHLORIDE, PRESERVATIVE FREE 10 ML: 5 INJECTION INTRAVENOUS at 20:17

## 2023-09-23 RX ADMIN — NIMODIPINE 60 MG: 30 CAPSULE, LIQUID FILLED ORAL at 08:30

## 2023-09-23 RX ADMIN — GABAPENTIN 200 MG: 100 CAPSULE ORAL at 20:14

## 2023-09-23 ASSESSMENT — PAIN DESCRIPTION - ORIENTATION: ORIENTATION: POSTERIOR;ANTERIOR

## 2023-09-23 ASSESSMENT — PAIN DESCRIPTION - LOCATION: LOCATION: HEAD

## 2023-09-23 ASSESSMENT — PAIN SCALES - GENERAL
PAINLEVEL_OUTOF10: 7
PAINLEVEL_OUTOF10: 7
PAINLEVEL_OUTOF10: 6
PAINLEVEL_OUTOF10: 7
PAINLEVEL_OUTOF10: 5
PAINLEVEL_OUTOF10: 7

## 2023-09-23 ASSESSMENT — PAIN DESCRIPTION - DESCRIPTORS: DESCRIPTORS: TEARING

## 2023-09-24 ENCOUNTER — APPOINTMENT (OUTPATIENT)
Dept: VASCULAR LAB | Age: 63
DRG: 021 | End: 2023-09-24
Payer: MEDICARE

## 2023-09-24 LAB
ANION GAP SERPL CALCULATED.3IONS-SCNC: 9 MMOL/L (ref 9–17)
BASOPHILS # BLD: <0.03 K/UL (ref 0–0.2)
BASOPHILS NFR BLD: 0 % (ref 0–2)
BUN SERPL-MCNC: 17 MG/DL (ref 8–23)
CALCIUM SERPL-MCNC: 8.1 MG/DL (ref 8.6–10.4)
CHLORIDE SERPL-SCNC: 109 MMOL/L (ref 98–107)
CO2 SERPL-SCNC: 23 MMOL/L (ref 20–31)
CREAT SERPL-MCNC: 0.6 MG/DL (ref 0.5–0.9)
ECHO BSA: 1.78 M2
EOSINOPHIL # BLD: <0.03 K/UL (ref 0–0.44)
EOSINOPHILS RELATIVE PERCENT: 0 % (ref 1–4)
ERYTHROCYTE [DISTWIDTH] IN BLOOD BY AUTOMATED COUNT: 14.5 % (ref 11.8–14.4)
GFR SERPL CREATININE-BSD FRML MDRD: >60 ML/MIN/1.73M2
GLUCOSE SERPL-MCNC: 125 MG/DL (ref 70–99)
HCT VFR BLD AUTO: 35.2 % (ref 36.3–47.1)
HGB BLD-MCNC: 11.6 G/DL (ref 11.9–15.1)
IMM GRANULOCYTES # BLD AUTO: 0.2 K/UL (ref 0–0.3)
IMM GRANULOCYTES NFR BLD: 2 %
LYMPHOCYTES NFR BLD: 1.23 K/UL (ref 1.1–3.7)
LYMPHOCYTES RELATIVE PERCENT: 10 % (ref 24–43)
MAGNESIUM SERPL-MCNC: 2 MG/DL (ref 1.6–2.6)
MCH RBC QN AUTO: 30.5 PG (ref 25.2–33.5)
MCHC RBC AUTO-ENTMCNC: 33 G/DL (ref 28.4–34.8)
MCV RBC AUTO: 92.6 FL (ref 82.6–102.9)
MONOCYTES NFR BLD: 0.64 K/UL (ref 0.1–1.2)
MONOCYTES NFR BLD: 5 % (ref 3–12)
NEUTROPHILS NFR BLD: 83 % (ref 36–65)
NEUTS SEG NFR BLD: 9.72 K/UL (ref 1.5–8.1)
NRBC BLD-RTO: 0 PER 100 WBC
PLATELET # BLD AUTO: 332 K/UL (ref 138–453)
PMV BLD AUTO: 10 FL (ref 8.1–13.5)
POTASSIUM SERPL-SCNC: 3.8 MMOL/L (ref 3.7–5.3)
RBC # BLD AUTO: 3.8 M/UL (ref 3.95–5.11)
RBC # BLD: ABNORMAL 10*6/UL
SODIUM SERPL-SCNC: 141 MMOL/L (ref 135–144)
VAS BASILAR MEAN VEL MANUAL: 26.9 CM/S
VAS BASILAR MEAN VEL MANUAL: 28.9 CM/S
VAS BASILAR MEAN VEL MANUAL: 34.6 CM/S
VAS BASILAR MEAN VEL MANUAL: 35.4 CM/S
VAS LEFT ACA MEAN VEL MANUAL: 21.6 CM/S
VAS LEFT DIST MCA MEAN VEL MANUAL: 59.3 CM/S
VAS LEFT DIST MCA MEAN VEL MANUAL: 60.8 CM/S
VAS LEFT DIST MCA MEAN VEL MANUAL: 62 CM/S
VAS LEFT DIST MCA MEAN VEL MANUAL: 67 CM/S
VAS LEFT DISTAL ICA MEAN VEL MANUAL: 20.4 CM/S
VAS LEFT DISTAL ICA MEAN VEL MANUAL: 21.9 CM/S
VAS LEFT DISTAL ICA MEAN VEL MANUAL: 24.6 CM/S
VAS LEFT LINDEGAARD RATIO MANUAL: 2.72
VAS LEFT LINDEGAARD RATIO MANUAL: 2.98
VAS LEFT LINDEGAARD RATIO MANUAL: 3.02
VAS LEFT LINDEGAARD RATIO MANUAL: 3.06
VAS LEFT MCA MEAN VEL MANUAL: 54.3 CM/S
VAS LEFT MID ACA MEAN VEL MANUAL: 24.3 CM/S
VAS LEFT MID ACA MEAN VEL MANUAL: 39.7 CM/S
VAS LEFT MID ACA MEAN VEL MANUAL: 62 CM/S
VAS LEFT MID MCA MEAN VEL MANUAL: 57.4 CM/S
VAS LEFT MID MCA MEAN VEL MANUAL: 57.8 CM/S
VAS LEFT MID MCA MEAN VEL MANUAL: 63.9 CM/S
VAS LEFT MID MCA MEAN VEL MANUAL: 66.2 CM/S
VAS LEFT PCA MEAN VEL MANUAL: 10.8 CM/S
VAS LEFT PCA MEAN VEL MANUAL: 20 CM/S
VAS LEFT PCA MEAN VEL MANUAL: 26.9 CM/S
VAS LEFT PROX ACA MEAN VEL MANUAL: 21.9 CM/S
VAS LEFT PROX ACA MEAN VEL MANUAL: 32 CM/S
VAS LEFT PROX ACA MEAN VEL MANUAL: 58.5 CM/S
VAS LEFT PROX ACA MEAN VEL MANUAL: 62 CM/S
VAS LEFT PROX MCA MEAN VEL MANUAL: 57.4 CM/S
VAS LEFT PROX MCA MEAN VEL MANUAL: 58.1 CM/S
VAS LEFT PROX MCA MEAN VEL MANUAL: 59.3 CM/S
VAS LEFT PROX MCA MEAN VEL MANUAL: 62.4 CM/S
VAS LEFT SIPHON MEAN VEL MANUAL: 29.3 CM/S
VAS LEFT SIPHON MEAN VEL MANUAL: 30.8 CM/S
VAS LEFT SIPHON MEAN VEL MANUAL: 31.6 CM/S
VAS LEFT SIPHON MEAN VEL MANUAL: 34.3 CM/S
VAS LEFT TERMINAL ICA MEAN VEL MANUAL: 31.6 CM/S
VAS LEFT TERMINAL ICA MEAN VEL MANUAL: 36.2 CM/S
VAS LEFT TERMINAL ICA MEAN VEL MANUAL: 38.5 CM/S
VAS LEFT TERMINAL ICA MEAN VEL MANUAL: 44.3 CM/S
VAS LEFT VERTEBRAL MEAN VEL MANUAL: 22.7 CM/S
VAS LEFT VERTEBRAL MEAN VEL MANUAL: 27.3 CM/S
VAS LEFT VERTEBRAL MEAN VEL MANUAL: 29.6 CM/S
VAS LEFT VERTEBRAL MEAN VEL MANUAL: 33.5 CM/S
VAS RIGHT ACA MEAN VEL MANUAL: 61.6 CM/S
VAS RIGHT DIST MCA MEAN VEL MANUAL: 70.5 CM/S
VAS RIGHT DIST MCA MEAN VEL MANUAL: 75.1 CM/S
VAS RIGHT DIST MCA MEAN VEL MANUAL: 78.2 CM/S
VAS RIGHT DIST MCA MEAN VEL MANUAL: 87 CM/S
VAS RIGHT DISTAL ICA MEAN VEL MANUAL: 21.6 CM/S
VAS RIGHT DISTAL ICA MEAN VEL MANUAL: 21.9 CM/S
VAS RIGHT DISTAL ICA MEAN VEL MANUAL: 23.1 CM/S
VAS RIGHT DISTAL ICA MEAN VEL MANUAL: 25 CM/S
VAS RIGHT LINDEGAARD RATIO MANUAL: 3
VAS RIGHT LINDEGAARD RATIO MANUAL: 3.3
VAS RIGHT LINDEGAARD RATIO MANUAL: 3.7
VAS RIGHT LINDEGAARD RATIO MANUAL: 4
VAS RIGHT MCA MEAN VEL MANUAL: 63.5 CM/S
VAS RIGHT MID ACA MEAN VEL MANUAL: 58.1 CM/S
VAS RIGHT MID ACA MEAN VEL MANUAL: 60.4 CM/S
VAS RIGHT MID ACA MEAN VEL MANUAL: 65.8 CM/S
VAS RIGHT MID ACA MEAN VEL MANUAL: 73.5 CM/S
VAS RIGHT MID MCA MEAN VEL MANUAL: 68.1 CM/S
VAS RIGHT MID MCA MEAN VEL MANUAL: 74.7 CM/S
VAS RIGHT MID MCA MEAN VEL MANUAL: 76.6 CM/S
VAS RIGHT MID MCA MEAN VEL MANUAL: 85.9 CM/S
VAS RIGHT PCA MEAN VEL MANUAL: 13.9 CM/S
VAS RIGHT PCA MEAN VEL MANUAL: 20 CM/S
VAS RIGHT PCA MEAN VEL MANUAL: 20 CM/S
VAS RIGHT PCA MEAN VEL MANUAL: 29.3 CM/S
VAS RIGHT PROX ACA MEAN VEL MANUAL: 68.1 CM/S
VAS RIGHT PROX ACA MEAN VEL MANUAL: 70.5 CM/S
VAS RIGHT PROX ACA MEAN VEL MANUAL: 71.6 CM/S
VAS RIGHT PROX ACA MEAN VEL MANUAL: 77.4 CM/S
VAS RIGHT PROX MCA MEAN VEL MANUAL: 61.2 CM/S
VAS RIGHT PROX MCA MEAN VEL MANUAL: 75.5 CM/S
VAS RIGHT PROX MCA MEAN VEL MANUAL: 83.2 CM/S
VAS RIGHT PROX MCA MEAN VEL MANUAL: 83.9 CM/S
VAS RIGHT SIPHON MEAN VEL MANUAL: 24.3 CM/S
VAS RIGHT SIPHON MEAN VEL MANUAL: 30.4 CM/S
VAS RIGHT SIPHON MEAN VEL MANUAL: 35.8 CM/S
VAS RIGHT SIPHON MEAN VEL MANUAL: 52.4 CM/S
VAS RIGHT TERMINAL ICA MEAN VEL MANUAL: 30 CM/S
VAS RIGHT TERMINAL ICA MEAN VEL MANUAL: 35.8 CM/S
VAS RIGHT TERMINAL ICA MEAN VEL MANUAL: 44.7 CM/S
VAS RIGHT TERMINAL ICA MEAN VEL MANUAL: 49.3 CM/S
VAS RIGHT VERTEBRAL MEAN VEL MANUAL: 27.7 CM/S
VAS RIGHT VERTEBRAL MEAN VEL MANUAL: 30.4 CM/S
VAS RIGHT VERTEBRAL MEAN VEL MANUAL: 35.4 CM/S
VAS RIGHT VERTEBRAL MEAN VEL MANUAL: 44.3 CM/S
WBC OTHER # BLD: 11.8 K/UL (ref 3.5–11.3)

## 2023-09-24 PROCEDURE — 83735 ASSAY OF MAGNESIUM: CPT

## 2023-09-24 PROCEDURE — 80048 BASIC METABOLIC PNL TOTAL CA: CPT

## 2023-09-24 PROCEDURE — 93886 INTRACRANIAL COMPLETE STUDY: CPT

## 2023-09-24 PROCEDURE — 93886 INTRACRANIAL COMPLETE STUDY: CPT | Performed by: PSYCHIATRY & NEUROLOGY

## 2023-09-24 PROCEDURE — 6370000000 HC RX 637 (ALT 250 FOR IP)

## 2023-09-24 PROCEDURE — 2000000000 HC ICU R&B

## 2023-09-24 PROCEDURE — 36415 COLL VENOUS BLD VENIPUNCTURE: CPT

## 2023-09-24 PROCEDURE — 2580000003 HC RX 258: Performed by: PSYCHIATRY & NEUROLOGY

## 2023-09-24 PROCEDURE — 6360000002 HC RX W HCPCS: Performed by: NURSE PRACTITIONER

## 2023-09-24 PROCEDURE — 6370000000 HC RX 637 (ALT 250 FOR IP): Performed by: NURSE PRACTITIONER

## 2023-09-24 PROCEDURE — 2580000003 HC RX 258

## 2023-09-24 PROCEDURE — 99233 SBSQ HOSP IP/OBS HIGH 50: CPT | Performed by: PSYCHIATRY & NEUROLOGY

## 2023-09-24 PROCEDURE — 85025 COMPLETE CBC W/AUTO DIFF WBC: CPT

## 2023-09-24 PROCEDURE — 99231 SBSQ HOSP IP/OBS SF/LOW 25: CPT | Performed by: PSYCHIATRY & NEUROLOGY

## 2023-09-24 RX ADMIN — SODIUM CHLORIDE, PRESERVATIVE FREE 10 ML: 5 INJECTION INTRAVENOUS at 19:49

## 2023-09-24 RX ADMIN — NIMODIPINE 60 MG: 30 CAPSULE, LIQUID FILLED ORAL at 23:59

## 2023-09-24 RX ADMIN — ATORVASTATIN CALCIUM 20 MG: 40 TABLET, FILM COATED ORAL at 19:50

## 2023-09-24 RX ADMIN — SENNOSIDES AND DOCUSATE SODIUM 2 TABLET: 50; 8.6 TABLET ORAL at 08:19

## 2023-09-24 RX ADMIN — CLOPIDOGREL BISULFATE 75 MG: 75 TABLET ORAL at 08:19

## 2023-09-24 RX ADMIN — ACETAMINOPHEN 650 MG: 325 TABLET ORAL at 11:43

## 2023-09-24 RX ADMIN — NIMODIPINE 60 MG: 30 CAPSULE, LIQUID FILLED ORAL at 08:18

## 2023-09-24 RX ADMIN — NIMODIPINE 60 MG: 30 CAPSULE, LIQUID FILLED ORAL at 19:50

## 2023-09-24 RX ADMIN — ENOXAPARIN SODIUM 40 MG: 100 INJECTION SUBCUTANEOUS at 08:19

## 2023-09-24 RX ADMIN — ACETAMINOPHEN 650 MG: 325 TABLET ORAL at 20:32

## 2023-09-24 RX ADMIN — ACETAMINOPHEN 650 MG: 325 TABLET ORAL at 03:41

## 2023-09-24 RX ADMIN — NIMODIPINE 60 MG: 30 CAPSULE, LIQUID FILLED ORAL at 16:51

## 2023-09-24 RX ADMIN — NIMODIPINE 60 MG: 30 CAPSULE, LIQUID FILLED ORAL at 11:43

## 2023-09-24 RX ADMIN — NIMODIPINE 60 MG: 30 CAPSULE, LIQUID FILLED ORAL at 03:41

## 2023-09-24 RX ADMIN — GABAPENTIN 200 MG: 100 CAPSULE ORAL at 19:50

## 2023-09-24 RX ADMIN — ASPIRIN 81 MG: 81 TABLET, CHEWABLE ORAL at 08:19

## 2023-09-24 RX ADMIN — PANTOPRAZOLE SODIUM 40 MG: 40 TABLET, DELAYED RELEASE ORAL at 08:18

## 2023-09-24 ASSESSMENT — PAIN SCALES - GENERAL
PAINLEVEL_OUTOF10: 1
PAINLEVEL_OUTOF10: 0
PAINLEVEL_OUTOF10: 0
PAINLEVEL_OUTOF10: 1
PAINLEVEL_OUTOF10: 0
PAINLEVEL_OUTOF10: 1

## 2023-09-24 ASSESSMENT — PAIN DESCRIPTION - LOCATION: LOCATION: HEAD

## 2023-09-25 ENCOUNTER — APPOINTMENT (OUTPATIENT)
Dept: VASCULAR LAB | Age: 63
DRG: 021 | End: 2023-09-25
Payer: MEDICARE

## 2023-09-25 VITALS
HEIGHT: 60 IN | DIASTOLIC BLOOD PRESSURE: 71 MMHG | RESPIRATION RATE: 19 BRPM | SYSTOLIC BLOOD PRESSURE: 116 MMHG | OXYGEN SATURATION: 94 % | HEART RATE: 83 BPM | TEMPERATURE: 98.4 F | WEIGHT: 165 LBS | BODY MASS INDEX: 32.39 KG/M2

## 2023-09-25 LAB
ANION GAP SERPL CALCULATED.3IONS-SCNC: 11 MMOL/L (ref 9–17)
BASOPHILS # BLD: 0.03 K/UL (ref 0–0.2)
BASOPHILS NFR BLD: 0 % (ref 0–2)
BUN SERPL-MCNC: 21 MG/DL (ref 8–23)
CALCIUM SERPL-MCNC: 7.9 MG/DL (ref 8.6–10.4)
CHLORIDE SERPL-SCNC: 107 MMOL/L (ref 98–107)
CO2 SERPL-SCNC: 20 MMOL/L (ref 20–31)
CREAT SERPL-MCNC: 0.6 MG/DL (ref 0.5–0.9)
EOSINOPHIL # BLD: 0.1 K/UL (ref 0–0.44)
EOSINOPHILS RELATIVE PERCENT: 1 % (ref 1–4)
ERYTHROCYTE [DISTWIDTH] IN BLOOD BY AUTOMATED COUNT: 14.8 % (ref 11.8–14.4)
GFR SERPL CREATININE-BSD FRML MDRD: >60 ML/MIN/1.73M2
GLUCOSE SERPL-MCNC: 86 MG/DL (ref 70–99)
HCT VFR BLD AUTO: 38.1 % (ref 36.3–47.1)
HGB BLD-MCNC: 12 G/DL (ref 11.9–15.1)
IMM GRANULOCYTES # BLD AUTO: 0.15 K/UL (ref 0–0.3)
IMM GRANULOCYTES NFR BLD: 1 %
LYMPHOCYTES NFR BLD: 4.44 K/UL (ref 1.1–3.7)
LYMPHOCYTES RELATIVE PERCENT: 40 % (ref 24–43)
MAGNESIUM SERPL-MCNC: 2.1 MG/DL (ref 1.6–2.6)
MCH RBC QN AUTO: 30.5 PG (ref 25.2–33.5)
MCHC RBC AUTO-ENTMCNC: 31.5 G/DL (ref 28.4–34.8)
MCV RBC AUTO: 96.9 FL (ref 82.6–102.9)
MONOCYTES NFR BLD: 0.95 K/UL (ref 0.1–1.2)
MONOCYTES NFR BLD: 9 % (ref 3–12)
NEUTROPHILS NFR BLD: 49 % (ref 36–65)
NEUTS SEG NFR BLD: 5.32 K/UL (ref 1.5–8.1)
NRBC BLD-RTO: 0 PER 100 WBC
PLATELET # BLD AUTO: 332 K/UL (ref 138–453)
PMV BLD AUTO: 10.1 FL (ref 8.1–13.5)
POTASSIUM SERPL-SCNC: 3.7 MMOL/L (ref 3.7–5.3)
RBC # BLD AUTO: 3.93 M/UL (ref 3.95–5.11)
RBC # BLD: ABNORMAL 10*6/UL
SODIUM SERPL-SCNC: 138 MMOL/L (ref 135–144)
WBC OTHER # BLD: 11 K/UL (ref 3.5–11.3)

## 2023-09-25 PROCEDURE — 83735 ASSAY OF MAGNESIUM: CPT

## 2023-09-25 PROCEDURE — 99233 SBSQ HOSP IP/OBS HIGH 50: CPT | Performed by: PSYCHIATRY & NEUROLOGY

## 2023-09-25 PROCEDURE — 6370000000 HC RX 637 (ALT 250 FOR IP)

## 2023-09-25 PROCEDURE — 93886 INTRACRANIAL COMPLETE STUDY: CPT | Performed by: PSYCHIATRY & NEUROLOGY

## 2023-09-25 PROCEDURE — 93886 INTRACRANIAL COMPLETE STUDY: CPT

## 2023-09-25 PROCEDURE — 85025 COMPLETE CBC W/AUTO DIFF WBC: CPT

## 2023-09-25 PROCEDURE — 36415 COLL VENOUS BLD VENIPUNCTURE: CPT

## 2023-09-25 PROCEDURE — 6360000002 HC RX W HCPCS: Performed by: NURSE PRACTITIONER

## 2023-09-25 PROCEDURE — 80048 BASIC METABOLIC PNL TOTAL CA: CPT

## 2023-09-25 RX ORDER — CLOPIDOGREL BISULFATE 75 MG/1
75 TABLET ORAL DAILY
Qty: 30 TABLET | Refills: 3 | Status: SHIPPED | OUTPATIENT
Start: 2023-09-26

## 2023-09-25 RX ORDER — NIMODIPINE 30 MG/1
60 CAPSULE, LIQUID FILLED ORAL EVERY 4 HOURS
Qty: 120 CAPSULE | Refills: 0 | Status: SHIPPED | OUTPATIENT
Start: 2023-09-25 | End: 2023-10-05

## 2023-09-25 RX ORDER — NIMODIPINE 30 MG/1
60 CAPSULE, LIQUID FILLED ORAL EVERY 4 HOURS
Qty: 120 CAPSULE | Refills: 0 | Status: SHIPPED | OUTPATIENT
Start: 2023-09-25 | End: 2023-09-25 | Stop reason: SDUPTHER

## 2023-09-25 RX ORDER — ASPIRIN 81 MG/1
81 TABLET, CHEWABLE ORAL DAILY
Qty: 30 TABLET | Refills: 3 | Status: SHIPPED | OUTPATIENT
Start: 2023-09-26

## 2023-09-25 RX ADMIN — PANTOPRAZOLE SODIUM 40 MG: 40 TABLET, DELAYED RELEASE ORAL at 06:40

## 2023-09-25 RX ADMIN — ENOXAPARIN SODIUM 40 MG: 100 INJECTION SUBCUTANEOUS at 08:28

## 2023-09-25 RX ADMIN — NIMODIPINE 60 MG: 30 CAPSULE, LIQUID FILLED ORAL at 12:52

## 2023-09-25 RX ADMIN — NIMODIPINE 60 MG: 30 CAPSULE, LIQUID FILLED ORAL at 08:29

## 2023-09-25 RX ADMIN — NIMODIPINE 60 MG: 30 CAPSULE, LIQUID FILLED ORAL at 03:50

## 2023-09-25 RX ADMIN — ACETAMINOPHEN 650 MG: 325 TABLET ORAL at 06:40

## 2023-09-25 RX ADMIN — ASPIRIN 81 MG: 81 TABLET, CHEWABLE ORAL at 08:28

## 2023-09-25 RX ADMIN — CLOPIDOGREL BISULFATE 75 MG: 75 TABLET ORAL at 08:28

## 2023-09-25 ASSESSMENT — PAIN SCALES - GENERAL
PAINLEVEL_OUTOF10: 1
PAINLEVEL_OUTOF10: 3

## 2023-09-25 ASSESSMENT — PAIN DESCRIPTION - LOCATION: LOCATION: HEAD

## 2023-09-25 NOTE — DISCHARGE SUMMARY
Medications:        Medication List        START taking these medications      aspirin 81 MG chewable tablet  Take 1 tablet by mouth daily  Start taking on: September 26, 2023     clopidogrel 75 MG tablet  Commonly known as: PLAVIX  Take 1 tablet by mouth daily  Start taking on: September 26, 2023     niMODipine 30 MG capsule  Commonly known as: NIMOTOP  Take 2 capsules by mouth every 4 hours for 10 days            CONTINUE taking these medications      gabapentin 100 MG capsule  Commonly known as: NEURONTIN     simvastatin 40 MG tablet  Commonly known as: ZOCOR     triamterene-hydroCHLOROthiazide 37.5-25 MG per capsule  Commonly known as: DYAZIDE            STOP taking these medications      SUMAtriptan 100 MG tablet  Commonly known as: IMITREX               Where to Get Your Medications        These medications were sent to [address] [place] [address]      Phone: [phone]   aspirin 81 MG chewable tablet  clopidogrel 75 MG tablet  niMODipine 30 MG capsule       Diet: ADULT DIET; Regular diet as tolerated  Activity: as tolerated. Resume activities after 1 week of discharge with gradual return to activities  Follow-up:  endovascular neurology in 2 weeks and 3 months.   Time Spent for discharge: > 30 minutes    Manjinder Das MD  Neuro Critical Care  9/25/2023, 1:24 PM

## 2023-09-25 NOTE — CARE COORDINATION
Transitional Planning  Plan to return home with spouse, has transportation. Possible dc today is [facility] is [facility].      Discharge  [facility] [address]  Clinical Case Management Department  Written by: Genna Francisco RN    Patient Name: Kaylee Macias  Attending Provider: No att. providers found  Admit Date: 9/15/2023  6:11 PM  MRN: [id number]  Account: [de-identified]                     : 1960  Discharge Date: 2023      Disposition: home    Genna Francisco RN

## 2023-09-25 NOTE — PLAN OF CARE
Problem: Discharge Planning  Goal: Discharge to home or other facility with appropriate resources  9/16/2023 1622 by Tim Crooks, RN  Outcome: Progressing  Flowsheets  Taken 9/16/2023 1600  Discharge to home or other facility with appropriate resources:   Identify barriers to discharge with patient and caregiver   Arrange for needed discharge resources and transportation as appropriate   Identify discharge learning needs (meds, wound care, etc)   Refer to discharge planning if patient needs post-hospital services based on physician order or complex needs related to functional status, cognitive ability or social support system  Taken 9/16/2023 1200  Discharge to home or other facility with appropriate resources:   Identify barriers to discharge with patient and caregiver   Arrange for needed discharge resources and transportation as appropriate   Identify discharge learning needs (meds, wound care, etc)   Refer to discharge planning if patient needs post-hospital services based on physician order or complex needs related to functional status, cognitive ability or social support system  Taken 9/16/2023 0800  Discharge to home or other facility with appropriate resources:   Identify barriers to discharge with patient and caregiver   Arrange for needed discharge resources and transportation as appropriate   Identify discharge learning needs (meds, wound care, etc)   Refer to discharge planning if patient needs post-hospital services based on physician order or complex needs related to functional status, cognitive ability or social support system  9/16/2023 0529 by Justo Castillo, RN  Outcome: Progressing     Problem: Skin/Tissue Integrity  Goal: Absence of new skin breakdown  Description: 1. Monitor for areas of redness and/or skin breakdown  2. Assess vascular access sites hourly  3. Every 4-6 hours minimum:  Change oxygen saturation probe site  4.   Every 4-6 hours:  If on nasal continuous positive airway pressure,
Problem: Discharge Planning  Goal: Discharge to home or other facility with appropriate resources  9/17/2023 2114 by Guzman Fields RN  Outcome: Progressing  9/17/2023 1733 by Jodie Frausto RN  Outcome: Progressing  Flowsheets  Taken 9/17/2023 1600  Discharge to home or other facility with appropriate resources:   Identify barriers to discharge with patient and caregiver   Arrange for needed discharge resources and transportation as appropriate   Identify discharge learning needs (meds, wound care, etc)   Refer to discharge planning if patient needs post-hospital services based on physician order or complex needs related to functional status, cognitive ability or social support system  Taken 9/17/2023 1200  Discharge to home or other facility with appropriate resources:   Identify barriers to discharge with patient and caregiver   Arrange for needed discharge resources and transportation as appropriate   Identify discharge learning needs (meds, wound care, etc)   Refer to discharge planning if patient needs post-hospital services based on physician order or complex needs related to functional status, cognitive ability or social support system  Taken 9/17/2023 0800  Discharge to home or other facility with appropriate resources:   Identify barriers to discharge with patient and caregiver   Arrange for needed discharge resources and transportation as appropriate   Identify discharge learning needs (meds, wound care, etc)   Refer to discharge planning if patient needs post-hospital services based on physician order or complex needs related to functional status, cognitive ability or social support system     Problem: Skin/Tissue Integrity  Goal: Absence of new skin breakdown  Description: 1. Monitor for areas of redness and/or skin breakdown  2. Assess vascular access sites hourly  3. Every 4-6 hours minimum:  Change oxygen saturation probe site  4.   Every 4-6 hours:  If on nasal continuous positive airway pressure,
Problem: Discharge Planning  Goal: Discharge to home or other facility with appropriate resources  9/18/2023 2056 by Virginia Tucker RN  Outcome: Progressing  9/18/2023 1901 by Huber Lenz RN  Outcome: Progressing  9/18/2023 1833 by Huber Lenz RN  Outcome: Progressing  Flowsheets  Taken 9/18/2023 1600  Discharge to home or other facility with appropriate resources:   Identify barriers to discharge with patient and caregiver   Arrange for needed discharge resources and transportation as appropriate   Identify discharge learning needs (meds, wound care, etc)   Refer to discharge planning if patient needs post-hospital services based on physician order or complex needs related to functional status, cognitive ability or social support system  Taken 9/18/2023 1200  Discharge to home or other facility with appropriate resources:   Identify barriers to discharge with patient and caregiver   Arrange for needed discharge resources and transportation as appropriate   Identify discharge learning needs (meds, wound care, etc)   Refer to discharge planning if patient needs post-hospital services based on physician order or complex needs related to functional status, cognitive ability or social support system  Taken 9/18/2023 0800  Discharge to home or other facility with appropriate resources:   Identify barriers to discharge with patient and caregiver   Arrange for needed discharge resources and transportation as appropriate   Identify discharge learning needs (meds, wound care, etc)   Refer to discharge planning if patient needs post-hospital services based on physician order or complex needs related to functional status, cognitive ability or social support system     Problem: Skin/Tissue Integrity  Goal: Absence of new skin breakdown  Description: 1. Monitor for areas of redness and/or skin breakdown  2. Assess vascular access sites hourly  3. Every 4-6 hours minimum:  Change oxygen saturation probe site  4.   Every 4-6
Problem: Discharge Planning  Goal: Discharge to home or other facility with appropriate resources  9/20/2023 1826 by Bassam Oreilly RN  Outcome: Progressing  Flowsheets (Taken 9/20/2023 0800)  Discharge to home or other facility with appropriate resources:   Refer to discharge planning if patient needs post-hospital services based on physician order or complex needs related to functional status, cognitive ability or social support system   Identify barriers to discharge with patient and caregiver  9/20/2023 0608 by David Tang RN  Outcome: Progressing     Problem: Skin/Tissue Integrity  Goal: Absence of new skin breakdown  Description: 1. Monitor for areas of redness and/or skin breakdown  2. Assess vascular access sites hourly  3. Every 4-6 hours minimum:  Change oxygen saturation probe site  4. Every 4-6 hours:  If on nasal continuous positive airway pressure, respiratory therapy assess nares and determine need for appliance change or resting period.   9/20/2023 1826 by Bassam Oreilly RN  Outcome: Progressing  9/20/2023 8933 by David Tang RN  Outcome: Progressing     Problem: Safety - Adult  Goal: Free from fall injury  9/20/2023 1826 by Bassam Oreilly RN  Outcome: Progressing  9/20/2023 0608 by David Tang RN  Outcome: Progressing     Problem: ABCDS Injury Assessment  Goal: Absence of physical injury  9/20/2023 1826 by Bassam Oreilly RN  Outcome: Progressing  9/20/2023 0608 by David Tang RN  Outcome: Progressing     Problem: Pain  Goal: Verbalizes/displays adequate comfort level or baseline comfort level  9/20/2023 1826 by Bassam Oreilly RN  Outcome: Progressing  9/20/2023 0608 by David Tang RN  Outcome: Progressing
Problem: Discharge Planning  Goal: Discharge to home or other facility with appropriate resources  9/21/2023 0443 by Tilda Severe, RN  Outcome: Progressing  9/20/2023 1826 by Juliana Castañeda RN  Outcome: Progressing  Flowsheets (Taken 9/20/2023 0800)  Discharge to home or other facility with appropriate resources:   Refer to discharge planning if patient needs post-hospital services based on physician order or complex needs related to functional status, cognitive ability or social support system   Identify barriers to discharge with patient and caregiver     Problem: Skin/Tissue Integrity  Goal: Absence of new skin breakdown  Description: 1. Monitor for areas of redness and/or skin breakdown  2. Assess vascular access sites hourly  3. Every 4-6 hours minimum:  Change oxygen saturation probe site  4. Every 4-6 hours:  If on nasal continuous positive airway pressure, respiratory therapy assess nares and determine need for appliance change or resting period.   9/21/2023 0443 by Tilda Severe, RN  Outcome: Progressing  9/20/2023 1826 by Juliana Castañeda RN  Outcome: Progressing     Problem: Safety - Adult  Goal: Free from fall injury  9/21/2023 0443 by Tilda Severe, RN  Outcome: Progressing  9/20/2023 1826 by Juliana Castañeda RN  Outcome: Progressing     Problem: ABCDS Injury Assessment  Goal: Absence of physical injury  9/21/2023 0443 by Tilda Severe, RN  Outcome: Progressing  9/20/2023 1826 by Juliana Castañeda RN  Outcome: Progressing     Problem: Pain  Goal: Verbalizes/displays adequate comfort level or baseline comfort level  9/21/2023 0443 by Tilda Severe, RN  Outcome: Progressing  9/20/2023 1826 by Juliana Castañeda RN  Outcome: Progressing
Problem: Discharge Planning  Goal: Discharge to home or other facility with appropriate resources  9/23/2023 0457 by Apryl Jacome RN  Outcome: Progressing  9/23/2023 0457 by Apryl Jacome RN  Outcome: Progressing  Flowsheets (Taken 9/22/2023 2000)  Discharge to home or other facility with appropriate resources: Identify discharge learning needs (meds, wound care, etc)  9/22/2023 1858 by Carmen Khan RN  Outcome: Progressing     Problem: Skin/Tissue Integrity  Goal: Absence of new skin breakdown  Description: 1. Monitor for areas of redness and/or skin breakdown  2. Assess vascular access sites hourly  3. Every 4-6 hours minimum:  Change oxygen saturation probe site  4. Every 4-6 hours:  If on nasal continuous positive airway pressure, respiratory therapy assess nares and determine need for appliance change or resting period.   9/23/2023 0457 by Apryl Jacome RN  Outcome: Progressing  9/23/2023 0457 by Apryl Jacome RN  Outcome: Progressing  9/22/2023 1858 by Carmen Khan RN  Outcome: Progressing     Problem: Safety - Adult  Goal: Free from fall injury  9/23/2023 0457 by Apryl Jacome RN  Outcome: Progressing  9/23/2023 0457 by Apryl Jacome RN  Outcome: Progressing  [address] (Taken 9/22/2023 2000)  Free From Fall Injury: Based on caregiver fall risk screen, instruct family/caregiver to ask for assistance with transferring infant if caregiver noted to have fall risk factors  9/22/2023 1858 by Carmen Khan RN  Outcome: Progressing     Problem: ABCDS Injury Assessment  Goal: Absence of physical injury  9/23/2023 0457 by Apryl Jacome RN  Outcome: Progressing  9/23/2023 0457 by Apryl Jacome RN  Outcome: Progressing  Flowsheets (Taken 9/22/2023 2000)  Absence of Physical Injury: Implement safety measures based on patient assessment  9/22/2023 1858 by Carmen Khan RN  Outcome: Progressing     Problem: Pain  Goal: Verbalizes/displays adequate comfort level or baseline comfort level  9/23/2023
Problem: Discharge Planning  Goal: Discharge to home or other facility with appropriate resources  Outcome: Completed     Problem: Skin/Tissue Integrity  Goal: Absence of new skin breakdown  Description: 1. Monitor for areas of redness and/or skin breakdown  2. Assess vascular access sites hourly  3. Every 4-6 hours minimum:  Change oxygen saturation probe site  4. Every 4-6 hours:  If on nasal continuous positive airway pressure, respiratory therapy assess nares and determine need for appliance change or resting period.   9/24/2023 0538 by Zakia Carvalho RN  Outcome: Completed  9/23/2023 1538 by Tania Espinoza RN  Outcome: Progressing     Problem: Safety - Adult  Goal: Free from fall injury  9/24/2023 0538 by Zakia Carvalho RN  Outcome: Completed  Flowsheets (Taken 9/23/2023 2000)  Free From Fall Injury: Based on caregiver fall risk screen, instruct family/caregiver to ask for assistance with transferring infant if caregiver noted to have fall risk factors  9/23/2023 1538 by Tania Espinoza RN  Outcome: Progressing     Problem: ABCDS Injury Assessment  Goal: Absence of physical injury  9/24/2023 0538 by Zakia Carvalho RN  Outcome: Completed  Flowsheets (Taken 9/23/2023 2000)  Absence of Physical Injury: Implement safety measures based on patient assessment  9/23/2023 1538 by Tania Espinoza RN  Outcome: Progressing     Problem: Pain  Goal: Verbalizes/displays adequate comfort level or baseline comfort level  9/24/2023 0538 by Zakia Carvalho RN  Outcome: Completed  9/23/2023 1538 by Tania Espinoza RN  Outcome: Progressing     Problem: Nutrition Deficit:  Goal: Optimize nutritional status  9/24/2023 0538 by Zakia Carvalho RN  Outcome: Completed  9/23/2023 1538 by Tania Espinoza RN  Outcome: Progressing
Problem: Discharge Planning  Goal: Discharge to home or other facility with appropriate resources  Outcome: Progressing     Problem: Skin/Tissue Integrity  Goal: Absence of new skin breakdown  Description: 1. Monitor for areas of redness and/or skin breakdown  2. Assess vascular access sites hourly  3. Every 4-6 hours minimum:  Change oxygen saturation probe site  4. Every 4-6 hours:  If on nasal continuous positive airway pressure, respiratory therapy assess nares and determine need for appliance change or resting period.   Outcome: Progressing     Problem: Safety - Adult  Goal: Free from fall injury  Outcome: Progressing     Problem: ABCDS Injury Assessment  Goal: Absence of physical injury  Outcome: Progressing
Problem: Discharge Planning  Goal: Discharge to home or other facility with appropriate resources  Outcome: Progressing     Problem: Skin/Tissue Integrity  Goal: Absence of new skin breakdown  Description: 1. Monitor for areas of redness and/or skin breakdown  2. Assess vascular access sites hourly  3. Every 4-6 hours minimum:  Change oxygen saturation probe site  4. Every 4-6 hours:  If on nasal continuous positive airway pressure, respiratory therapy assess nares and determine need for appliance change or resting period.   Outcome: Progressing     Problem: Safety - Adult  Goal: Free from fall injury  Outcome: Progressing     Problem: ABCDS Injury Assessment  Goal: Absence of physical injury  Outcome: Progressing     Problem: Pain  Goal: Verbalizes/displays adequate comfort level or baseline comfort level  Outcome: Progressing
Problem: Discharge Planning  Goal: Discharge to home or other facility with appropriate resources  Outcome: Progressing     Problem: Skin/Tissue Integrity  Goal: Absence of new skin breakdown  Description: 1. Monitor for areas of redness and/or skin breakdown  2. Assess vascular access sites hourly  3. Every 4-6 hours minimum:  Change oxygen saturation probe site  4. Every 4-6 hours:  If on nasal continuous positive airway pressure, respiratory therapy assess nares and determine need for appliance change or resting period.   Outcome: Progressing     Problem: Safety - Adult  Goal: Free from fall injury  Outcome: Progressing     Problem: ABCDS Injury Assessment  Goal: Absence of physical injury  Outcome: Progressing     Problem: Pain  Goal: Verbalizes/displays adequate comfort level or baseline comfort level  Outcome: Progressing     Problem: Nutrition Deficit:  Goal: Optimize nutritional status  9/22/2023 7628 by Delfina Hernandez RN  Outcome: Progressing
Problem: Discharge Planning  Goal: Discharge to home or other facility with appropriate resources  Outcome: Progressing     Problem: Skin/Tissue Integrity  Goal: Absence of new skin breakdown  Description: 1. Monitor for areas of redness and/or skin breakdown  2. Assess vascular access sites hourly  3. Every 4-6 hours minimum:  Change oxygen saturation probe site  4. Every 4-6 hours:  If on nasal continuous positive airway pressure, respiratory therapy assess nares and determine need for appliance change or resting period.   Outcome: Progressing     Problem: Safety - Adult  Goal: Free from fall injury  Outcome: Progressing  Flowsheets (Taken 9/19/2023 0800)  Free From Fall Injury: Instruct family/caregiver on patient safety     Problem: ABCDS Injury Assessment  Goal: Absence of physical injury  Outcome: Progressing  Flowsheets (Taken 9/19/2023 0800)  Absence of Physical Injury: Implement safety measures based on patient assessment     Problem: Pain  Goal: Verbalizes/displays adequate comfort level or baseline comfort level  Outcome: Progressing
Problem: Discharge Planning  Goal: Discharge to home or other facility with appropriate resources  Outcome: Progressing  Flowsheets  Taken 9/18/2023 1600  Discharge to home or other facility with appropriate resources:   Identify barriers to discharge with patient and caregiver   Arrange for needed discharge resources and transportation as appropriate   Identify discharge learning needs (meds, wound care, etc)   Refer to discharge planning if patient needs post-hospital services based on physician order or complex needs related to functional status, cognitive ability or social support system  Taken 9/18/2023 1200  Discharge to home or other facility with appropriate resources:   Identify barriers to discharge with patient and caregiver   Arrange for needed discharge resources and transportation as appropriate   Identify discharge learning needs (meds, wound care, etc)   Refer to discharge planning if patient needs post-hospital services based on physician order or complex needs related to functional status, cognitive ability or social support system  Taken 9/18/2023 0800  Discharge to home or other facility with appropriate resources:   Identify barriers to discharge with patient and caregiver   Arrange for needed discharge resources and transportation as appropriate   Identify discharge learning needs (meds, wound care, etc)   Refer to discharge planning if patient needs post-hospital services based on physician order or complex needs related to functional status, cognitive ability or social support system     Problem: Skin/Tissue Integrity  Goal: Absence of new skin breakdown  Description: 1. Monitor for areas of redness and/or skin breakdown  2. Assess vascular access sites hourly  3. Every 4-6 hours minimum:  Change oxygen saturation probe site  4.   Every 4-6 hours:  If on nasal continuous positive airway pressure, respiratory therapy assess nares and determine need for appliance change or resting
Problem: Pain  Goal: Verbalizes/displays adequate comfort level or baseline comfort level  9/25/2023 1315 by Krystyna Marmolejo RN  Outcome: Adequate for Discharge
Problem: Pain  Goal: Verbalizes/displays adequate comfort level or baseline comfort level  Outcome: Progressing
Problem: Pain  Goal: Verbalizes/displays adequate comfort level or baseline comfort level  Outcome: Progressing     Problem: Discharge Planning  Goal: Discharge to home or other facility with appropriate resources  Recent Flowsheet Documentation  Taken 9/24/2023 2000 by Apryl Jacome RN  Discharge to home or other facility with appropriate resources: Arrange for interpreters to assist at discharge as needed     Problem: Safety - Adult  Goal: Free from fall injury  Recent Flowsheet Documentation  Taken 9/24/2023 2000 by Apryl Jacome RN  Free From Fall Injury: Based on caregiver fall risk screen, instruct family/caregiver to ask for assistance with transferring infant if caregiver noted to have fall risk factors     Problem: ABCDS Injury Assessment  Goal: Absence of physical injury  Recent Flowsheet Documentation  Taken 9/24/2023 2000 by Apryl Jacome RN  Absence of Physical Injury: Implement safety measures based on patient assessment
Problem: Safety - Adult  Goal: Free from fall injury  9/22/2023 0450 by Angelica Hagan RN  Outcome: Progressing     Problem: Pain  Goal: Verbalizes/displays adequate comfort level or baseline comfort level  9/22/2023 0450 by Angelica Hagan RN  Outcome: Progressing
Problem: Skin/Tissue Integrity  Goal: Absence of new skin breakdown  Description: 1. Monitor for areas of redness and/or skin breakdown  2. Assess vascular access sites hourly  3. Every 4-6 hours minimum:  Change oxygen saturation probe site  4. Every 4-6 hours:  If on nasal continuous positive airway pressure, respiratory therapy assess nares and determine need for appliance change or resting period.   9/23/2023 1538 by Marc Cuello RN  Outcome: Progressing     Problem: Safety - Adult  Goal: Free from fall injury  9/23/2023 1538 by Marc Cuello RN  Outcome: Progressing     Problem: ABCDS Injury Assessment  Goal: Absence of physical injury  9/23/2023 1538 by Marc Cuello RN  Outcome: Progressing     Problem: Pain  Goal: Verbalizes/displays adequate comfort level or baseline comfort level  9/23/2023 1538 by Marc Cuello RN  Outcome: Progressing     Problem: Nutrition Deficit:  Goal: Optimize nutritional status  9/23/2023 1538 by Marc Cuello RN  Outcome: Progressing
period.   Outcome: Progressing     Problem: Safety - Adult  Goal: Free from fall injury  Outcome: Progressing     Problem: ABCDS Injury Assessment  Goal: Absence of physical injury  Outcome: Progressing     Problem: Pain  Goal: Verbalizes/displays adequate comfort level or baseline comfort level  Outcome: Progressing  Flowsheets (Taken 9/17/2023 1600)  Verbalizes/displays adequate comfort level or baseline comfort level:   Encourage patient to monitor pain and request assistance   Assess pain using appropriate pain scale   Administer analgesics based on type and severity of pain and evaluate response   Implement non-pharmacological measures as appropriate and evaluate response   Consider cultural and social influences on pain and pain management   Notify Licensed Independent Practitioner if interventions unsuccessful or patient reports new pain
interventions unsuccessful or patient reports new pain  Taken 9/21/2023 0800  Verbalizes/displays adequate comfort level or baseline comfort level:   Encourage patient to monitor pain and request assistance   Assess pain using appropriate pain scale   Administer analgesics based on type and severity of pain and evaluate response   Implement non-pharmacological measures as appropriate and evaluate response   Consider cultural and social influences on pain and pain management   Notify Licensed Independent Practitioner if interventions unsuccessful or patient reports new pain  9/21/2023 0443 by Sam Barger RN  Outcome: Progressing
management   Notify Licensed Independent Practitioner if interventions unsuccessful or patient reports new pain  Taken 9/18/2023 0800  Verbalizes/displays adequate comfort level or baseline comfort level:   Encourage patient to monitor pain and request assistance   Assess pain using appropriate pain scale   Administer analgesics based on type and severity of pain and evaluate response   Implement non-pharmacological measures as appropriate and evaluate response   Consider cultural and social influences on pain and pain management   Notify Licensed Independent Practitioner if interventions unsuccessful or patient reports new pain

## 2023-09-27 ENCOUNTER — PATIENT OUTREACH (OUTPATIENT)
Dept: CARE COORDINATION | Facility: CLINIC | Age: 63
End: 2023-09-27
Payer: MEDICARE

## 2023-09-27 DIAGNOSIS — I60.9 SAH (SUBARACHNOID HEMORRHAGE) (MULTI): Primary | ICD-10-CM

## 2023-09-27 LAB
ECHO BSA: 1.78 M2
VAS BASILAR MEAN VEL MANUAL: 29.6 CM/S
VAS LEFT DIST MCA MEAN VEL MANUAL: 57.8 CM/S
VAS LEFT DISTAL ICA MEAN VEL MANUAL: 26.6 CM/S
VAS LEFT LINDEGAARD RATIO MANUAL: 2.17
VAS LEFT MID ACA MEAN VEL MANUAL: 69.7 CM/S
VAS LEFT MID MCA MEAN VEL MANUAL: 50 CM/S
VAS LEFT PCA MEAN VEL MANUAL: 24.6 CM/S
VAS LEFT PROX ACA MEAN VEL MANUAL: 59.7 CM/S
VAS LEFT PROX MCA MEAN VEL MANUAL: 56.7 CM/S
VAS LEFT SIPHON MEAN VEL MANUAL: 35.8 CM/S
VAS LEFT TERMINAL ICA MEAN VEL MANUAL: 36.6 CM/S
VAS LEFT VERTEBRAL MEAN VEL MANUAL: 22.7 CM/S
VAS RIGHT DIST MCA MEAN VEL MANUAL: 62 CM/S
VAS RIGHT DISTAL ICA MEAN VEL MANUAL: 23.1 CM/S
VAS RIGHT LINDEGAARD RATIO MANUAL: 3.2
VAS RIGHT MID ACA MEAN VEL MANUAL: 63.9 CM/S
VAS RIGHT MID MCA MEAN VEL MANUAL: 71.2 CM/S
VAS RIGHT PCA MEAN VEL MANUAL: 27.3 CM/S
VAS RIGHT PROX ACA MEAN VEL MANUAL: 67.8 CM/S
VAS RIGHT PROX MCA MEAN VEL MANUAL: 76.6 CM/S
VAS RIGHT SIPHON MEAN VEL MANUAL: 36.2 CM/S
VAS RIGHT TERMINAL ICA MEAN VEL MANUAL: 39.3 CM/S
VAS RIGHT VERTEBRAL MEAN VEL MANUAL: 22.7 CM/S

## 2023-09-27 PROCEDURE — 93886 INTRACRANIAL COMPLETE STUDY: CPT | Performed by: PSYCHIATRY & NEUROLOGY

## 2023-09-27 RX ORDER — NAPROXEN SODIUM 220 MG/1
1 TABLET, FILM COATED ORAL DAILY
COMMUNITY
Start: 2023-09-26

## 2023-09-27 RX ORDER — NIMODIPINE 30 MG/1
60 CAPSULE, LIQUID FILLED ORAL
COMMUNITY
Start: 2023-09-25 | End: 2023-10-05

## 2023-09-27 RX ORDER — CLOPIDOGREL BISULFATE 75 MG/1
1 TABLET ORAL DAILY
COMMUNITY
Start: 2023-09-26 | End: 2024-06-07 | Stop reason: ALTCHOICE

## 2023-09-27 NOTE — PROGRESS NOTES
Discharge Facility: [facility]  Discharge Diagnosis: subarachnoid hemorrhage, aneurysm  Admission Date: 9/15/23  Discharge Date: 9/25/23    PCP Appointment Date: Left VM to return call if needed  Specialist Appointment Date: 10/13/23 neurosurgery  Hospital Encounter and Summary: Linked   Attempted to call patient for hospital follow up, no return call as of this note.

## 2023-09-28 ENCOUNTER — TELEPHONE (OUTPATIENT)
Dept: NEUROLOGY | Age: 63
End: 2023-09-28

## 2023-09-28 NOTE — TELEPHONE ENCOUNTER
Patient would like to know if she can still take magnesium? She would also like to know what she can take for her headaches? Patient states that she was told to take nimodipine, but on her discharge papers it states for her to continue taking the Dyazide. Will she need to be on both medications ?

## 2023-10-10 ENCOUNTER — PATIENT MESSAGE (OUTPATIENT)
Dept: PRIMARY CARE | Facility: CLINIC | Age: 63
End: 2023-10-10
Payer: MEDICARE

## 2023-10-11 ENCOUNTER — ANCILLARY PROCEDURE (OUTPATIENT)
Dept: RADIOLOGY | Facility: CLINIC | Age: 63
End: 2023-10-11
Payer: MEDICARE

## 2023-10-11 DIAGNOSIS — I60.9 SAH (SUBARACHNOID HEMORRHAGE) (MULTI): ICD-10-CM

## 2023-10-11 DIAGNOSIS — F41.8 SITUATIONAL ANXIETY: Primary | ICD-10-CM

## 2023-10-11 PROCEDURE — G1004 CDSM NDSC: HCPCS

## 2023-10-11 PROCEDURE — 70450 CT HEAD/BRAIN W/O DYE: CPT | Performed by: RADIOLOGY

## 2023-10-12 ENCOUNTER — TELEPHONE (OUTPATIENT)
Dept: NEUROSURGERY | Age: 63
End: 2023-10-12

## 2023-10-12 ENCOUNTER — PATIENT MESSAGE (OUTPATIENT)
Dept: PRIMARY CARE | Facility: CLINIC | Age: 63
End: 2023-10-12
Payer: MEDICARE

## 2023-10-12 NOTE — TELEPHONE ENCOUNTER
Imaging has been requested from an outside facility. Date: 10/12/2023  Facility: [facility]. Images: CT Head. Confirmed in PACs?: Not yet. Reports in Media?: Not yet. Date of Next Appointment: 10/13/2023. **If images have not yet been confirmed into PACs, the encounter will be updated when confirmed. **       Faxed request to: [phone].

## 2023-10-12 NOTE — TELEPHONE ENCOUNTER
Can we call radiology directly downstairs first thing in the morning and see if they can take care of this ASAP?

## 2023-10-13 ENCOUNTER — OFFICE VISIT (OUTPATIENT)
Dept: NEUROLOGY | Age: 63
End: 2023-10-13
Payer: MEDICARE

## 2023-10-13 ENCOUNTER — OFFICE VISIT (OUTPATIENT)
Dept: NEUROSURGERY | Age: 63
End: 2023-10-13
Payer: MEDICARE

## 2023-10-13 VITALS
WEIGHT: 165 LBS | HEART RATE: 92 BPM | HEIGHT: 60 IN | SYSTOLIC BLOOD PRESSURE: 130 MMHG | BODY MASS INDEX: 32.39 KG/M2 | DIASTOLIC BLOOD PRESSURE: 89 MMHG | TEMPERATURE: 97.5 F

## 2023-10-13 VITALS
DIASTOLIC BLOOD PRESSURE: 89 MMHG | HEIGHT: 60 IN | HEART RATE: 92 BPM | SYSTOLIC BLOOD PRESSURE: 130 MMHG | OXYGEN SATURATION: 96 % | BODY MASS INDEX: 32.39 KG/M2 | WEIGHT: 165 LBS | TEMPERATURE: 97.5 F

## 2023-10-13 DIAGNOSIS — I67.1 CEREBRAL ANEURYSM: Primary | ICD-10-CM

## 2023-10-13 DIAGNOSIS — G91.1 OBSTRUCTIVE HYDROCEPHALUS (HCC): ICD-10-CM

## 2023-10-13 DIAGNOSIS — I60.01: ICD-10-CM

## 2023-10-13 DIAGNOSIS — I60.01: Primary | ICD-10-CM

## 2023-10-13 PROCEDURE — 1123F ACP DISCUSS/DSCN MKR DOCD: CPT | Performed by: NURSE PRACTITIONER

## 2023-10-13 PROCEDURE — 99214 OFFICE O/P EST MOD 30 MIN: CPT | Performed by: NURSE PRACTITIONER

## 2023-10-13 PROCEDURE — 99215 OFFICE O/P EST HI 40 MIN: CPT | Performed by: PSYCHIATRY & NEUROLOGY

## 2023-10-13 PROCEDURE — 1123F ACP DISCUSS/DSCN MKR DOCD: CPT | Performed by: PSYCHIATRY & NEUROLOGY

## 2023-10-13 RX ORDER — ALPRAZOLAM 0.25 MG/1
0.25 TABLET ORAL
COMMUNITY
Start: 2022-12-09

## 2023-10-13 NOTE — PROGRESS NOTES
aneurysm spanning the terminus to the communicating segment, measuring length 7.41mm x width 3.95mm  The above was treated 6Fr short sheath, 5Fr Berenstein, later exchanged to 6Fr [employer], Navien 058, Phenom 27, Synchro Support, Pipeline shield [id number], flow diverter stent is well apposed to the wall, with early contrast stagnation into the aneurysm, achieving Mahamed 2. Aspirin 325mg and plavix 600mg and heparin 2000 units were loaded after the [place] was successfully deployed. 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 CT head showed right hemisphere [facility] and CTA showed right ICA supraclinoid rupture aneurysm. Presented with H&H 2, MFS 3. 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 - repeat CTA head in 2-3 months before seeing Dr. Basia Rivas to check the flow diverter device  - also discussed with patient that she will need another DSA in about 6 months time to check the flow diverter device.           - f/u with Dr Basia Rivas in 3 months in the clinic    Marielena Boyle MD PGY 8  Stroke, [address] [facility]

## 2023-10-13 NOTE — PATIENT INSTRUCTIONS
Continue aspirin 81mg daily    Continue plavix 75mg daily    CTA head     See Dr Cristina Zuniga in 2-3 months with CTA head done

## 2023-10-13 NOTE — PATIENT COMMUNICATION
Emailed patient the final report of the CT scan and was given number to the patient to call for the films to be emailed to her.

## 2023-10-13 NOTE — PROGRESS NOTES
[address] [place] [address] [address]  [facility] [address] [place] [address], [address] [place] [address] Road [address]  Dept: [phone]    Patient:  Jean-Paul Borjas  YOB: 1960  Date: 10/13/23    The patient is a 70 y.o. female who presents today for consult of the following problems:     Chief Complaint   Patient presents with    Follow-Up from [address] [place] [address] in PACS. HPI:     Jean-Paul Borjas is a 70 y.o. female who presents to the office for follow up of recent hospitalization for aneurysmal SAH. Patient presents today to review repeat scan to ensure no delayed development of obstructive hydrocephalus. Occasional headaches, nothing intolerable. Has noticed some issues with left eye vision, which is her close up vision. Does have eye appointment in December. Some issues with emotional lability. Does have upcoming appointment through her PCP to discuss. Uses gabapentin for her back, takes at times for back and helps with sleep. Has noticed she is having some difficulty falling and staying asleep recently. History:     Past Medical History:   Diagnosis Date    Hyperlipidemia     Hypertension     Migraines      History reviewed. No pertinent surgical history.   Family History   Problem Relation Age of Onset    Heart Attack Mother     Heart Disease Mother     Heart Attack Father     Heart Disease Father      Current Outpatient Medications on File Prior to Visit   Medication Sig Dispense Refill    ALPRAZolam (XANAX) 0.25 MG tablet Take 1 tablet by mouth.      vitamin D3 (CHOLECALCIFEROL) 125 MCG (5000 UT) TABS tablet Take 1 tablet by mouth daily      aspirin 81 MG chewable tablet Take 1 tablet by mouth daily 30 tablet 3    clopidogrel (PLAVIX) 75 MG tablet Take 1 tablet by mouth daily 30 tablet 3    niMODipine (NIMOTOP) 30 MG capsule Take 2 capsules by mouth every 4 hours for 10 days 120 capsule 0

## 2023-10-19 DIAGNOSIS — E78.2 MIXED HYPERLIPIDEMIA: ICD-10-CM

## 2023-10-19 RX ORDER — SIMVASTATIN 40 MG/1
TABLET, FILM COATED ORAL
Qty: 90 TABLET | Refills: 3 | Status: SHIPPED | OUTPATIENT
Start: 2023-10-19

## 2023-11-01 ENCOUNTER — APPOINTMENT (OUTPATIENT)
Dept: PRIMARY CARE | Facility: CLINIC | Age: 63
End: 2023-11-01
Payer: MEDICARE

## 2023-11-03 ENCOUNTER — ANCILLARY PROCEDURE (OUTPATIENT)
Dept: RADIOLOGY | Facility: CLINIC | Age: 63
End: 2023-11-03
Payer: MEDICARE

## 2023-11-03 DIAGNOSIS — R93.89 ABNORMAL FINDINGS ON DIAGNOSTIC IMAGING OF OTHER SPECIFIED BODY STRUCTURES: ICD-10-CM

## 2023-11-03 PROCEDURE — 71250 CT THORAX DX C-: CPT

## 2023-11-03 PROCEDURE — 71250 CT THORAX DX C-: CPT | Performed by: RADIOLOGY

## 2023-11-22 ENCOUNTER — TELEPHONE (OUTPATIENT)
Dept: NEUROSURGERY | Age: 63
End: 2023-11-22

## 2023-11-22 NOTE — TELEPHONE ENCOUNTER
Brice Cyr,   The patient is experiencing random nose bleeds and she's becoming concerned. She is currently taking Plavix.  Please Advise

## 2023-11-27 ENCOUNTER — ANCILLARY PROCEDURE (OUTPATIENT)
Dept: RADIOLOGY | Facility: CLINIC | Age: 63
End: 2023-11-27
Payer: MEDICARE

## 2023-11-27 ENCOUNTER — OFFICE VISIT (OUTPATIENT)
Dept: ORTHOPEDIC SURGERY | Facility: CLINIC | Age: 63
End: 2023-11-27
Payer: MEDICARE

## 2023-11-27 DIAGNOSIS — M25.561 ACUTE PAIN OF RIGHT KNEE: ICD-10-CM

## 2023-11-27 PROCEDURE — 99213 OFFICE O/P EST LOW 20 MIN: CPT | Performed by: INTERNAL MEDICINE

## 2023-11-27 PROCEDURE — 1036F TOBACCO NON-USER: CPT | Performed by: INTERNAL MEDICINE

## 2023-11-27 PROCEDURE — 73562 X-RAY EXAM OF KNEE 3: CPT | Mod: RIGHT SIDE | Performed by: INTERNAL MEDICINE

## 2023-11-27 PROCEDURE — 1160F RVW MEDS BY RX/DR IN RCRD: CPT | Performed by: INTERNAL MEDICINE

## 2023-11-27 PROCEDURE — 20611 DRAIN/INJ JOINT/BURSA W/US: CPT | Performed by: INTERNAL MEDICINE

## 2023-11-27 PROCEDURE — 99203 OFFICE O/P NEW LOW 30 MIN: CPT | Performed by: INTERNAL MEDICINE

## 2023-11-27 PROCEDURE — 2500000005 HC RX 250 GENERAL PHARMACY W/O HCPCS: Performed by: INTERNAL MEDICINE

## 2023-11-27 PROCEDURE — 2500000004 HC RX 250 GENERAL PHARMACY W/ HCPCS (ALT 636 FOR OP/ED): Performed by: INTERNAL MEDICINE

## 2023-11-27 PROCEDURE — 1159F MED LIST DOCD IN RCRD: CPT | Performed by: INTERNAL MEDICINE

## 2023-11-27 PROCEDURE — 73562 X-RAY EXAM OF KNEE 3: CPT | Mod: RT,FY

## 2023-11-27 RX ORDER — LIDOCAINE HYDROCHLORIDE 10 MG/ML
3 INJECTION INFILTRATION; PERINEURAL ONCE
Status: SHIPPED | OUTPATIENT
Start: 2023-11-27

## 2023-11-27 RX ORDER — LIDOCAINE HYDROCHLORIDE 10 MG/ML
3 INJECTION INFILTRATION; PERINEURAL
Status: COMPLETED | OUTPATIENT
Start: 2023-11-27 | End: 2023-11-27

## 2023-11-27 RX ORDER — BETAMETHASONE SODIUM PHOSPHATE AND BETAMETHASONE ACETATE 3; 3 MG/ML; MG/ML
18 INJECTION, SUSPENSION INTRA-ARTICULAR; INTRALESIONAL; INTRAMUSCULAR; SOFT TISSUE ONCE
Status: SHIPPED | OUTPATIENT
Start: 2023-11-27

## 2023-11-27 RX ORDER — BETAMETHASONE SODIUM PHOSPHATE AND BETAMETHASONE ACETATE 3; 3 MG/ML; MG/ML
3 INJECTION, SUSPENSION INTRA-ARTICULAR; INTRALESIONAL; INTRAMUSCULAR; SOFT TISSUE
Status: COMPLETED | OUTPATIENT
Start: 2023-11-27 | End: 2023-11-27

## 2023-11-27 RX ADMIN — BETAMETHASONE ACETATE AND BETAMETHASONE SODIUM PHOSPHATE 3 ML: 3; 3 INJECTION, SUSPENSION INTRA-ARTICULAR; INTRALESIONAL; INTRAMUSCULAR; SOFT TISSUE at 16:40

## 2023-11-27 RX ADMIN — LIDOCAINE HYDROCHLORIDE 3 ML: 10 INJECTION, SOLUTION INFILTRATION; PERINEURAL at 16:40

## 2023-11-27 NOTE — PROGRESS NOTES
Acute Injury New Patient Visit    CC:   Chief Complaint   Patient presents with   • Right Knee - Pain     DOI RT Knee  X rays today       HPI: Geri is a 63 y.o. female presents with acute right knee pain which developed over the weekend.  There is no fall or traumatic injury.  She was doing a lot of work around the house and afterwards start noticing pain in the right knee.  Had swelling which has improved with over-the-counter anti-inflammatories.        Review of Systems   GENERAL: Negative for malaise, significant weight loss, fever  MUSCULOSKELETAL: See HPI  NEURO:  Negative for numbness / tingling     Past Medical History  Past Medical History:   Diagnosis Date   • Conjunctival hemorrhage, left eye 06/19/2015    Subconjunctival hemorrhage of left eye   • Other conditions influencing health status 03/31/2017    Mammogram normal   • Personal history of other diseases of the nervous system and sense organs 11/28/2012    History of migraine headaches   • Personal history of other infectious and parasitic diseases     History of intestinal infection       Medication review  Medication Documentation Review Audit       Reviewed by Davon Salcedo DO (Physician) on 06/09/23 at 1318      Medication Order Taking? Sig Documenting Provider Last Dose Status   ALPRAZolam (Xanax) 0.25 mg tablet [id number] Yes Take 1 tablet (0.25 mg) by mouth every 6 hours during the day. Historical Provider, MD Taking Active   gabapentin (Neurontin) 100 mg capsule [id number] Yes Take 2 capsules (200 mg) by mouth once daily at bedtime. Davon Salcedo DO Taking Active   simvastatin (Zocor) 40 mg tablet [id number] Yes Take 1 tablet (40 mg) by mouth once daily at bedtime. Historical Provider, MD Taking Active   triamterene-hydrochlorothiazid (Dyazide) 37.5-25 mg capsule [id number] Yes Take 1 capsule by mouth once daily. Historical Provider, MD Taking Active                    Allergies  Allergies   Allergen Reactions   • Adhesive Tape-Silicones  Unknown   • Epinephrine Unknown   • Lidocaine Unknown   • Losartan-Hydrochlorothiazide Unknown       Social History  Social History     Socioeconomic History   • Marital status:      Spouse name: Not on file   • Number of children: Not on file   • Years of education: Not on file   • Highest education level: Not on file   Occupational History     Employer: [employer]   Tobacco Use   • Smoking status: Never   • Smokeless tobacco: Never   Vaping Use   • Vaping Use: Never used   Substance and Sexual Activity   • Alcohol use: Yes   • Drug use: Never   • Sexual activity: Not on file   Other Topics Concern   • Not on file   Social History Narrative   • Not on file     Social Determinants of Health     Financial Resource Strain: Not on file   Food Insecurity: Not on file   Transportation Needs: Not on file   Physical Activity: Not on file   Stress: Not on file   Social Connections: Not on file   Intimate Partner Violence: Not on file   Housing Stability: Not on file       Surgical History  Past Surgical History:   Procedure Laterality Date   • COLONOSCOPY  11/28/2012    Complete Colonoscopy   • COLONOSCOPY  11/28/2012    Complete Colonoscopy   • OTHER SURGICAL HISTORY  11/28/2012    Uterine Myomectomy   • OTHER SURGICAL HISTORY  11/28/2012    Cervical Conization By Laser   • REFRACTIVE SURGERY  10/11/2013    Corneal LASIK   • TONSILLECTOMY  11/28/2012    Tonsillectomy       Physical Exam:  GENERAL:  Patient is awake, alert, and oriented to person place and time.  Patient appears well nourished and well kept.  Affect Calm, Not Acutely Distressed.  HEENT:  Normocephalic, Atraumatic, EOMI  CARDIOVASCULAR:  Hemodynamically stable.  RESPIRATORY:  Normal respirations with unlabored breathing.  Extremity: Right knee shows 1+ effusion the right knee.  She can flex her right knee to 125 degrees and full extension at 0 degrees.  Negative valgus and varus stress test.  Negative Lachman's test.  Some mild pain of the medial  and lateral joint line.  Patellar and quadricep mechanism intact.  Left knee was examined for comparison.      Diagnostics: X-rays reviewed      Procedure: L Inj/Asp: R knee on 11/27/2023 4:40 PM  Indications: pain  Details: 22 G needle, ultrasound-guided lateral approach  Medications: 3 mL betamethasone acet,sod phos 6 mg/mL; 3 mL lidocaine 10 mg/mL (1 %)  Outcome: tolerated well, no immediate complications  Procedure, treatment alternatives, risks and benefits explained, specific risks discussed. Consent was given by the patient. Immediately prior to procedure a time out was called to verify the correct patient, procedure, equipment, support staff and site/side marked as required. Patient was prepped and draped in the usual sterile fashion.           Assessment: Right knee osteoarthritis    Plan: Geri presents with right knee pain and swelling secondary to right knee osteoarthritis.  We did offer a ultrasound-guided corticosteroid to the right knee joint risk and benefits of the procedure discussed with the patient.  After injection she had improvement of her pain symptoms.  She will follow-up in 3 weeks and reevaluate her knee, if she still think some pain but improvement we discussed the possibility of future viscosupplementation injection and physical therapy.  If there is no improvement we may consider further imaging for his MRI.    Orders Placed This Encounter   • XR knee right 3 views   • Point of Care Ultrasound      At the conclusion of the visit there were no further questions by the patient/family regarding their plan of care.  Patient was instructed to call or return with any issues, questions, or concerns regarding their injury and/or treatment plan described above.     11/27/23 at 4:38 PM - Chance Maldonado MD    Office: [phone]    This note was prepared using voice recognition software.  The details of this note are correct and have been reviewed, and corrected to the best of my ability.   Some grammatical errors may persist related to the Dragon software.

## 2023-12-08 ENCOUNTER — PATIENT MESSAGE (OUTPATIENT)
Dept: PRIMARY CARE | Facility: CLINIC | Age: 63
End: 2023-12-08

## 2023-12-08 ENCOUNTER — OFFICE VISIT (OUTPATIENT)
Dept: PRIMARY CARE | Facility: CLINIC | Age: 63
End: 2023-12-08
Payer: MEDICARE

## 2023-12-08 VITALS
OXYGEN SATURATION: 97 % | WEIGHT: 150.4 LBS | BODY MASS INDEX: 28.89 KG/M2 | TEMPERATURE: 97.2 F | RESPIRATION RATE: 16 BRPM | DIASTOLIC BLOOD PRESSURE: 94 MMHG | SYSTOLIC BLOOD PRESSURE: 142 MMHG | HEART RATE: 94 BPM

## 2023-12-08 DIAGNOSIS — I10 PRIMARY HYPERTENSION: Primary | ICD-10-CM

## 2023-12-08 PROBLEM — I60.9 SUBARACHNOID HEMORRHAGE (MULTI): Status: ACTIVE | Noted: 2023-09-15

## 2023-12-08 PROBLEM — I60.01: Status: ACTIVE | Noted: 2023-09-16

## 2023-12-08 PROBLEM — G91.1 OBSTRUCTIVE HYDROCEPHALUS (MULTI): Status: ACTIVE | Noted: 2023-09-15

## 2023-12-08 PROBLEM — I61.5 INTRAVENTRICULAR HEMORRHAGE (MULTI): Status: ACTIVE | Noted: 2023-09-17

## 2023-12-08 PROBLEM — I67.1: Status: ACTIVE | Noted: 2023-09-15

## 2023-12-08 PROCEDURE — 1036F TOBACCO NON-USER: CPT | Performed by: INTERNAL MEDICINE

## 2023-12-08 PROCEDURE — G0008 ADMIN INFLUENZA VIRUS VAC: HCPCS | Performed by: INTERNAL MEDICINE

## 2023-12-08 PROCEDURE — 1160F RVW MEDS BY RX/DR IN RCRD: CPT | Performed by: INTERNAL MEDICINE

## 2023-12-08 PROCEDURE — 1159F MED LIST DOCD IN RCRD: CPT | Performed by: INTERNAL MEDICINE

## 2023-12-08 PROCEDURE — 3077F SYST BP >= 140 MM HG: CPT | Performed by: INTERNAL MEDICINE

## 2023-12-08 PROCEDURE — 99215 OFFICE O/P EST HI 40 MIN: CPT | Performed by: INTERNAL MEDICINE

## 2023-12-08 PROCEDURE — 90662 IIV NO PRSV INCREASED AG IM: CPT | Performed by: INTERNAL MEDICINE

## 2023-12-08 PROCEDURE — 3080F DIAST BP >= 90 MM HG: CPT | Performed by: INTERNAL MEDICINE

## 2023-12-08 RX ORDER — TRIAMTERENE AND HYDROCHLOROTHIAZIDE 37.5; 25 MG/1; MG/1
1 CAPSULE ORAL DAILY
Qty: 90 CAPSULE | Refills: 1 | Status: SHIPPED | OUTPATIENT
Start: 2023-12-08 | End: 2024-06-07 | Stop reason: SDUPTHER

## 2023-12-08 RX ORDER — AMLODIPINE BESYLATE 2.5 MG/1
2.5 TABLET ORAL DAILY
Qty: 30 TABLET | Refills: 5 | Status: SHIPPED | OUTPATIENT
Start: 2023-12-08 | End: 2024-06-07 | Stop reason: ALTCHOICE

## 2023-12-08 ASSESSMENT — ENCOUNTER SYMPTOMS
BLOOD IN STOOL: 0
DEPRESSION: 1
NECK PAIN: 1
CONSTIPATION: 0
DYSPHORIC MOOD: 1
DIARRHEA: 0
FATIGUE: 1

## 2023-12-08 ASSESSMENT — PATIENT HEALTH QUESTIONNAIRE - PHQ9
SUM OF ALL RESPONSES TO PHQ9 QUESTIONS 1 AND 2: 0
1. LITTLE INTEREST OR PLEASURE IN DOING THINGS: NOT AT ALL
2. FEELING DOWN, DEPRESSED OR HOPELESS: NOT AT ALL

## 2023-12-08 NOTE — PROGRESS NOTES
"Subjective   Patient ID: Geri Christianson is a 63 y.o. female who presents for Follow-up (6 month), Depression (Had appt with Sammi but canceled because she was feeling good then. ), Flu Vaccine, and Blood Pressure Check.    The patient reports an episode of severe sudden headache, described as \"the worst headache of her life\", while driving to [place] on 9/15/2023.  She presented to the ED at [facility] where a CT Head confirmed diffuse right-sided subarachnoid hemorrhage.  She was then transferred to a second facility, [facility], where CTA head and neck which showed right ICA supraclinoid aneurysm.  The patient was seen by Endovascular Neurosurgery and was taken for an emergent DSA with pipeline shield flow diverter stent embolization.  She was subsequently started on ASA/Clopidogrel, and admitted to the ICU.  Her condition continued to improve gradually until she was discharged in stable condition on 9/25/2023.  Since then, she has been following with  from Neurosurgery and has an upcoming appointment to establish care with  from Endoscopic Neurosurgery on 12/27/2023.  She is also scheduled for an upcoming CTA Head to monitor the flow diverter device.    The patient reports that she is physically feeling better overall since leaving the hospital.  She has experienced mild \"twinges\" but denies any significant headache since that time.  She also reports neck pain that is relieved with icing.  The patient continues to take clopidogrel 75mg once daily and ASA 81mg once daily consistently.  She notes intermittent epistaxis, but is otherwise tolerating the medications well.  The patient denies any dizziness or lightheadedness.    The patient has been monitoring her blood pressure at home, and notes that average readings are in the 140-160s/90s mmHg range.  She recalls that measurements were within the normal range while recently on nimodipine 60 mg every four hours, and " tolerated this medication well.     The patient mentions depressed mood associated with feelings of guilt related to being unable to work despite reassurances from her employer.  This is associated with fatigue and weight gain, and she plans to retire in 2024.  The patient is also quite concerned about her current health condition.  She was unable to attend her appointment with Behavioral Services Management, but intends to schedule this soon.    The patient denies any hematochezia, melena, or bowel problems.       Depression      Review of Systems   Constitutional:  Positive for fatigue.   Gastrointestinal:  Negative for blood in stool, constipation and diarrhea.   Musculoskeletal:  Positive for neck pain.   Psychiatric/Behavioral:  Positive for depression and dysphoric mood.      Objective   Physical Exam  Constitutional:       Appearance: Normal appearance.   Neck:      Vascular: No carotid bruit.   Cardiovascular:      Rate and Rhythm: Normal rate and regular rhythm.      Heart sounds: Normal heart sounds.   Pulmonary:      Effort: Pulmonary effort is normal.      Breath sounds: Normal breath sounds.   Abdominal:      General: Bowel sounds are normal.      Palpations: Abdomen is soft.      Tenderness: There is no abdominal tenderness.   Skin:     General: Skin is warm and dry.   Neurological:      General: No focal deficit present.      Mental Status: She is alert and oriented to person, place, and time. Mental status is at baseline.   Psychiatric:         Mood and Affect: Mood normal.         Behavior: Behavior normal.         Assessment/Plan       IMPRESSIONS/PLAN :      Mediastinal Cyst  - CT Chest 11/2023 showed cystic lesion in the anterior mediastinum which is unchanged when  compared to the previous cardiac score CT of 08/11/2023. Differential considerations include a forget duplication cyst, pericardial cyst,lymphangioma, thymic cyst, etc.  Reviewed with patient, and will consult with Radiology regarding  next steps.    Depression  - Ordered referral for Adult Psychology with Laura MARTIN.  Call the clinic if symptoms persist or worsen.    Right Hemisphere SAH  - Occurred 9/15/2023, treated with DSA with pipeline shield flow diverter stent embolization.  Continue with clopidogrel 75mg once daily, and ASA 81mg once daily.  Following with  from [facility]rology at [facility], and  from Endovascular Neurosurgery.  Scheduled for appointment with Dr. Jeffers 12/27/2023.    HTN   - /94 in office today, 142/82 on repeat.  Home readings in the 160s/90smmHg per patient.  Prescribed amlodipine 2.5mg once daily, and instructed patient to confirm with Neurosurgery this is okay before starting medication and she verbalized agreement.  Also advised to ask about goal blood pressure given history of SAH.  Continue on triamterene-HCTZ 37.5-25mg QD.  Call the clinic with any concerns.      HLD   - Stable per 9/2023.  Continue on simvastatin 40mg QD.  ASCVD 5.8% 12/2023.  CT Cardiac Score 202.43 per 8/2023.    Thyroid Nodule   - US thyroid 1/2021 showed stable, subcentimeter nodules of left gland. Stable per 11/2022 repeat.     Mammogram   - 11/25/2022 screening test found probably benign calcifications, and radiology recommended repeat in 6 months.  Benign per 6/2023 repeat.  Due in 11/2023 for bilateral screening.     Shingles   - Can continue to take Gabapentin 100 mg as two tablets at bedtime as needed.     Seasonal Allergies  - Advised the patient to try Xyzal as directed on the packaging over the counter.  Call the clinic if symptoms persist or worsen.    Health Maintenance   - Routine labs 9/2023.  Last Mammogram 6/2023.  Last colonoscopy performed 6/2021, due for repeat 2031.  Patient received high-dose Influenza vaccine in the clinic today, tolerated well.      Follow-up according to regular health maintenance, call sooner if needed.        Scribe Attestation  By signing my name below, I, Sadaf Barahona ,  Scribe   attest that this documentation has been prepared under the direction and in the presence of Davon Salcedo DO.     Sadaf Barahona 12/08/23 11:43 AM

## 2023-12-12 ENCOUNTER — OFFICE VISIT (OUTPATIENT)
Dept: ORTHOPEDIC SURGERY | Facility: CLINIC | Age: 63
End: 2023-12-12
Payer: MEDICARE

## 2023-12-12 DIAGNOSIS — M17.10 ARTHRITIS OF KNEE: Primary | ICD-10-CM

## 2023-12-12 PROCEDURE — 1159F MED LIST DOCD IN RCRD: CPT | Performed by: INTERNAL MEDICINE

## 2023-12-12 PROCEDURE — 99213 OFFICE O/P EST LOW 20 MIN: CPT | Performed by: INTERNAL MEDICINE

## 2023-12-12 PROCEDURE — 1036F TOBACCO NON-USER: CPT | Performed by: INTERNAL MEDICINE

## 2023-12-12 PROCEDURE — 1160F RVW MEDS BY RX/DR IN RCRD: CPT | Performed by: INTERNAL MEDICINE

## 2023-12-12 NOTE — PROGRESS NOTES
CC:   Chief Complaint   Patient presents with    Right Knee - Follow-up     OA  Re evaluate today       HPI: Geri is a 63 y.o. female presenting for follow-up for right knee pain status post intra-articular corticosteroid to the right knee joint.  She felt much better after the injection, feels almost 70% better.  No instability.  Overall she is pleased with the progress she made so far.        Review of Systems   GENERAL: Negative for malaise, significant weight loss, fever  MUSCULOSKELETAL: See HPI  NEURO:  Negative for numbness / tingling     Past Medical History  Past Medical History:   Diagnosis Date    Conjunctival hemorrhage, left eye 06/19/2015    Subconjunctival hemorrhage of left eye    Other conditions influencing health status 03/31/2017    Mammogram normal    Personal history of other diseases of the nervous system and sense organs 11/28/2012    History of migraine headaches    Personal history of other infectious and parasitic diseases     History of intestinal infection       Medication review  Medication Documentation Review Audit       Reviewed by Davon Salcedo DO (Physician) on 12/08/23 at 1147      Medication Order Taking? Sig Documenting Provider Last Dose Status   ALPRAZolam (Xanax) 0.25 mg tablet [id number] Yes Take 1 tablet (0.25 mg) by mouth every 6 hours during the day. Historical Provider, MD Taking Active   aspirin 81 mg chewable tablet [id number] Yes Chew 1 tablet (81 mg) once daily. Historical Provider, MD Taking Active   betamethasone acet,sod phos (Celestone) injection 18 mg [id number]   Chance Maldonado MD  Active   clopidogrel (Plavix) 75 mg tablet [id number] Yes Take 1 tablet (75 mg) by mouth once daily. Historical Provider, MD Taking Active   gabapentin (Neurontin) 100 mg capsule [id number] Yes Take 2 capsules (200 mg) by mouth once daily at bedtime. Davon Salcedo DO Taking Active   lidocaine (Xylocaine) 10 mg/mL (1 %) injection 3 mL [id number]   Chance Maldonado MD   Active   simvastatin (Zocor) 40 mg tablet [id number] Yes take 1 tablet by mouth in the evening. Davon Salcedo DO Taking Active     Discontinued 12/08/23 1136   Discontinued 12/08/23 1145   triamterene-hydrochlorothiazid (Dyazide) 37.5-25 mg capsule [id number]  Take 1 capsule by mouth once daily. Davon Salcedo, DO  Active                    Allergies  Allergies   Allergen Reactions    Adhesive Tape-Silicones Unknown    Epinephrine Unknown    Lidocaine Unknown    Losartan-Hydrochlorothiazide Unknown       Social History  Social History     Socioeconomic History    Marital status:      Spouse name: Not on file    Number of children: Not on file    Years of education: Not on file    Highest education level: Not on file   Occupational History     Employer: [employer]   Tobacco Use    Smoking status: Never    Smokeless tobacco: Never   Vaping Use    Vaping Use: Never used   Substance and Sexual Activity    Alcohol use: Yes    Drug use: Never    Sexual activity: Not on file   Other Topics Concern    Not on file   Social History Narrative    Not on file     Social Determinants of Health     Financial Resource Strain: Not on file   Food Insecurity: Not on file   Transportation Needs: Not on file   Physical Activity: Not on file   Stress: Not on file   Social Connections: Not on file   Intimate Partner Violence: Not on file   Housing Stability: Not on file       Surgical History  Past Surgical History:   Procedure Laterality Date    COLONOSCOPY  11/28/2012    Complete Colonoscopy    COLONOSCOPY  11/28/2012    Complete Colonoscopy    OTHER SURGICAL HISTORY  11/28/2012    Uterine Myomectomy    OTHER SURGICAL HISTORY  11/28/2012    Cervical Conization By Laser    REFRACTIVE SURGERY  10/11/2013    Corneal LASIK    TONSILLECTOMY  11/28/2012    Tonsillectomy       Physical Exam:  GENERAL:  Patient is awake, alert, and oriented to person place and time.  Patient appears well nourished and well kept.  Affect Calm, Not  Acutely Distressed.  HEENT:  Normocephalic, Atraumatic, EOMI  CARDIOVASCULAR:  Hemodynamically stable.  RESPIRATORY:  Normal respirations with unlabored breathing.  Extremity: Right knee shows trace amount effusion.  She can flex her right knee to 120 degrees full extension at 0 degrees.  Negative valgus and varus stress test.  Negative Lachman's test.  Negative Nikko's test.  Minimal pain over the medial joint line.  Minimal pain to the lateral joint.  Mild discomfort over the medial and lateral patellar facets.  Patellar and quadricep mechanism intact.  She is able to get up from her chair with no difficulties.  Left knee exam for comparison.      Diagnostics: X-rays reviewed  XR knee right 3 views  Interpreted By:  Chance Farmer,   STUDY:  XR KNEE RIGHT 3 VIEWS;  ;  11/27/2023 3:11 pm      INDICATION:  Signs/Symptoms:pain.      ACCESSION NUMBER(S):  [id number]      ORDERING CLINICIAN:  CHANCE FARMER      FINDINGS:  Right knee x-rays three views AP, lateral and sunrise view: No acute  fractures, no dislocation. Mild degenerative changes.          Signed by: Chance Farmer 11/27/2023 4:48 PM  Dictation workstation:   [id number]        Procedure: None    Assessment: Right knee osteoarthritis    Plan: Geri presents here for follow-up for right knee pain status post corticosteroid to the right knee joint.  She got relief after injection, there were no mechanical symptoms.  We did discuss we could repeat the cortisone injection only after 3 to 4 months.  We discussed the possibility of future viscosupplement injection.  Will get involved with some physical therapy.  At this point is doing well we will have her Follow-up as needed.  She will call the office if she wants to go forward with right knee 3 series viscosupplementation injection for right knee osteoarthritis.    In conclusion, this patient has OA of the knee which is symptomatic causing significant morning stiffness lasting up to an hour with  popping, clicking, grinding with ROM, which is worse with prolonged standing or going up/down stairs.  This affects functional activities and ADLs.  There is radiographic evidence of OA with joint space narrowing and marginal osteophyte formation.  This patient has also failed pharmacologic treatment for OA including OTC analgesics, antiinflammatory medications, as well as intraarticular corticosteroid injections.  For these reasons, I feel the patient is a candidate for viscosupplementation injections of the right knee.     Orders Placed This Encounter    Referral to Physical Therapy      At the conclusion of the visit there were no further questions by the patient/family regarding their plan of care.  Patient was instructed to call or return with any issues, questions, or concerns regarding their injury and/or treatment plan described above.     12/12/23 at 3:09 PM - Chance Maldonado MD    Office: [phone]    This note was prepared using voice recognition software.  The details of this note are correct and have been reviewed, and corrected to the best of my ability.  Some grammatical errors may persist related to the Dragon software.

## 2023-12-13 ENCOUNTER — HOSPITAL ENCOUNTER (OUTPATIENT)
Dept: CT IMAGING | Age: 63
Discharge: HOME | End: 2023-12-15
Attending: PSYCHIATRY & NEUROLOGY
Payer: MEDICARE

## 2023-12-13 ENCOUNTER — HOSPITAL ENCOUNTER (OUTPATIENT)
Age: 63
Discharge: HOME | End: 2023-12-13
Attending: PSYCHIATRY & NEUROLOGY
Payer: MEDICARE

## 2023-12-13 DIAGNOSIS — I67.1 CEREBRAL ANEURYSM: Primary | ICD-10-CM

## 2023-12-13 LAB
CREAT SERPL-MCNC: 1 MG/DL (ref 0.5–0.9)
GFR SERPL CREATININE-BSD FRML MDRD: >60 ML/MIN/1.73M2

## 2023-12-13 PROCEDURE — 70496 CT ANGIOGRAPHY HEAD: CPT

## 2023-12-13 PROCEDURE — 82565 ASSAY OF CREATININE: CPT

## 2023-12-13 PROCEDURE — 6360000004 HC RX CONTRAST MEDICATION: Performed by: PSYCHIATRY & NEUROLOGY

## 2023-12-13 PROCEDURE — 36415 COLL VENOUS BLD VENIPUNCTURE: CPT

## 2023-12-13 RX ADMIN — IOPAMIDOL 75 ML: 755 INJECTION, SOLUTION INTRAVENOUS at 15:33

## 2023-12-27 ENCOUNTER — OFFICE VISIT (OUTPATIENT)
Dept: NEUROLOGY | Age: 63
End: 2023-12-27
Payer: MEDICARE

## 2023-12-27 VITALS
HEART RATE: 77 BPM | BODY MASS INDEX: 29.45 KG/M2 | DIASTOLIC BLOOD PRESSURE: 85 MMHG | SYSTOLIC BLOOD PRESSURE: 120 MMHG | WEIGHT: 150 LBS | HEIGHT: 60 IN

## 2023-12-27 DIAGNOSIS — R63.0 DECREASED APPETITE: ICD-10-CM

## 2023-12-27 DIAGNOSIS — I60.01: Primary | ICD-10-CM

## 2023-12-27 PROCEDURE — 1123F ACP DISCUSS/DSCN MKR DOCD: CPT | Performed by: PSYCHIATRY & NEUROLOGY

## 2023-12-27 PROCEDURE — 99215 OFFICE O/P EST HI 40 MIN: CPT | Performed by: PSYCHIATRY & NEUROLOGY

## 2023-12-27 RX ORDER — AMLODIPINE BESYLATE 2.5 MG/1
2.5 TABLET ORAL DAILY
COMMUNITY

## 2024-01-02 ENCOUNTER — OFFICE VISIT (OUTPATIENT)
Dept: CARDIOLOGY | Facility: CLINIC | Age: 64
End: 2024-01-02
Payer: MEDICARE

## 2024-01-02 ENCOUNTER — LAB (OUTPATIENT)
Dept: LAB | Facility: LAB | Age: 64
End: 2024-01-02
Payer: MEDICARE

## 2024-01-02 VITALS
SYSTOLIC BLOOD PRESSURE: 148 MMHG | DIASTOLIC BLOOD PRESSURE: 88 MMHG | HEIGHT: 60 IN | HEART RATE: 75 BPM | WEIGHT: 150.1 LBS | OXYGEN SATURATION: 95 % | BODY MASS INDEX: 29.47 KG/M2

## 2024-01-02 DIAGNOSIS — R93.1 ELEVATED CORONARY ARTERY CALCIUM SCORE: ICD-10-CM

## 2024-01-02 DIAGNOSIS — E78.49 OTHER HYPERLIPIDEMIA: ICD-10-CM

## 2024-01-02 DIAGNOSIS — I10 HYPERTENSION, UNSPECIFIED TYPE: Primary | ICD-10-CM

## 2024-01-02 PROCEDURE — 1126F AMNT PAIN NOTED NONE PRSNT: CPT | Performed by: INTERNAL MEDICINE

## 2024-01-02 PROCEDURE — 1036F TOBACCO NON-USER: CPT | Performed by: INTERNAL MEDICINE

## 2024-01-02 PROCEDURE — 93010 ELECTROCARDIOGRAM REPORT: CPT | Performed by: INTERNAL MEDICINE

## 2024-01-02 PROCEDURE — 3077F SYST BP >= 140 MM HG: CPT | Performed by: INTERNAL MEDICINE

## 2024-01-02 PROCEDURE — 36415 COLL VENOUS BLD VENIPUNCTURE: CPT

## 2024-01-02 PROCEDURE — 1160F RVW MEDS BY RX/DR IN RCRD: CPT | Performed by: INTERNAL MEDICINE

## 2024-01-02 PROCEDURE — 99204 OFFICE O/P NEW MOD 45 MIN: CPT | Performed by: INTERNAL MEDICINE

## 2024-01-02 PROCEDURE — 99214 OFFICE O/P EST MOD 30 MIN: CPT | Performed by: INTERNAL MEDICINE

## 2024-01-02 PROCEDURE — 93005 ELECTROCARDIOGRAM TRACING: CPT | Performed by: INTERNAL MEDICINE

## 2024-01-02 PROCEDURE — 82172 ASSAY OF APOLIPOPROTEIN: CPT

## 2024-01-02 PROCEDURE — 3079F DIAST BP 80-89 MM HG: CPT | Performed by: INTERNAL MEDICINE

## 2024-01-02 PROCEDURE — 1159F MED LIST DOCD IN RCRD: CPT | Performed by: INTERNAL MEDICINE

## 2024-01-02 ASSESSMENT — PATIENT HEALTH QUESTIONNAIRE - PHQ9
2. FEELING DOWN, DEPRESSED OR HOPELESS: NOT AT ALL
1. LITTLE INTEREST OR PLEASURE IN DOING THINGS: NOT AT ALL
SUM OF ALL RESPONSES TO PHQ9 QUESTIONS 1 AND 2: 0
1. LITTLE INTEREST OR PLEASURE IN DOING THINGS: NOT AT ALL
2. FEELING DOWN, DEPRESSED OR HOPELESS: NOT AT ALL
SUM OF ALL RESPONSES TO PHQ9 QUESTIONS 1 AND 2: 0

## 2024-01-02 ASSESSMENT — PAIN SCALES - GENERAL: PAINLEVEL: 0-NO PAIN

## 2024-01-02 ASSESSMENT — COLUMBIA-SUICIDE SEVERITY RATING SCALE - C-SSRS
1. IN THE PAST MONTH, HAVE YOU WISHED YOU WERE DEAD OR WISHED YOU COULD GO TO SLEEP AND NOT WAKE UP?: NO
6. HAVE YOU EVER DONE ANYTHING, STARTED TO DO ANYTHING, OR PREPARED TO DO ANYTHING TO END YOUR LIFE?: NO
2. HAVE YOU ACTUALLY HAD ANY THOUGHTS OF KILLING YOURSELF?: NO

## 2024-01-02 NOTE — PROGRESS NOTES
Cardiology Office Note    REASON FOR VISIT:  Establish care, elevated CAC score  PCP (requesting provider): Davon Salcedo DO.    HPI:  Geri Christianson is a 63 y.o. female with a past medical history of DLD, HTN, recent R ICA aneurysm with rupture causing diffuse SAH (s/p stent placement 9/2023) who presents by PCP referral for follow-up of elevated CAC score of 202 in 6/2023.     Noted recent history of SAH 2/2 R ICA aneurysm ruptured treated with stent placement by neurosurgery at [facility].  She acknowledges that she is to be on dual antiplatelet therapy until the end of January.  Follows with Dr. Jeffers and to obtain repeat DSA prior to next follow-up.    Otherwise patient denies exertional dyspnea, exertional angina, PND, orthopnea.  Also denies palpitations, lightheadedness, dizziness, syncope.  Previously exercise regularly with recumbent bike.  States exercises been put on hold since her subarachnoid hemorrhage.  Plan to start exercising again with recumbent bike or treadmill work in the new year.    Cardiac data:  TTE 9/2023 via [facility] records: EF 55-60%; mildly thickened leaflet, mild MR  CT calcium score 6/2023: score 202.4    PAST MEDICAL HISTORY  Past Medical History:   Diagnosis Date    Conjunctival hemorrhage, left eye 06/19/2015    Subconjunctival hemorrhage of left eye    Other conditions influencing health status 03/31/2017    Mammogram normal    Personal history of other diseases of the nervous system and sense organs 11/28/2012    History of migraine headaches    Personal history of other infectious and parasitic diseases     History of intestinal infection       PAST SURGICAL HISTORY  Past Surgical History:   Procedure Laterality Date    COLONOSCOPY  11/28/2012    Complete Colonoscopy    COLONOSCOPY  11/28/2012    Complete Colonoscopy    OTHER SURGICAL HISTORY  11/28/2012    Uterine Myomectomy    OTHER SURGICAL HISTORY  11/28/2012    Cervical Conization By Laser    REFRACTIVE SURGERY   10/11/2013    Corneal LASIK    TONSILLECTOMY  11/28/2012    Tonsillectomy       FAMILY HISTORY  Family History   Problem Relation Name Age of Onset    Breast cancer Mother      Coronary artery disease Father      Rheum arthritis Paternal Grandmother         SOCIAL HISTORY   reports that she has never smoked. She has never used smokeless tobacco. She reports current alcohol use. She reports that she does not use drugs.    REVIEW OF SYSTEMS  Review of Systems  A 10+ point review of systems was otherwise negative except as noted and per HPI.    ALLERGIES  Allergies   Allergen Reactions    Adhesive Tape-Silicones Unknown    Epinephrine Unknown    Lidocaine Unknown    Losartan-Hydrochlorothiazide Unknown       MEDICATIONS  Current Outpatient Medications   Medication Instructions    ALPRAZolam (Xanax) 0.25 mg tablet 1 tablet, oral, Every 6 hours during day    amLODIPine (NORVASC) 2.5 mg, oral, Daily    aspirin 81 mg chewable tablet 1 tablet, oral, Daily    clopidogrel (Plavix) 75 mg tablet 1 tablet, oral, Daily    gabapentin (NEURONTIN) 200 mg, oral, Nightly    simvastatin (Zocor) 40 mg tablet take 1 tablet by mouth in the evening.    triamterene-hydrochlorothiazid (Dyazide) 37.5-25 mg capsule 1 capsule, oral, Daily       VITALS  There were no vitals filed for this visit.   There is no height or weight on file to calculate BMI.    PHYSICAL EXAM  Physical Exam  Constitutional:       General: She is not in acute distress.     Appearance: Normal appearance. She is not ill-appearing.   HENT:      Head: Normocephalic and atraumatic.   Eyes:      Extraocular Movements: Extraocular movements intact.      Pupils: Pupils are equal, round, and reactive to light.   Neck:      Vascular: No carotid bruit.   Cardiovascular:      Rate and Rhythm: Normal rate and regular rhythm.      Heart sounds: Normal heart sounds. No murmur heard.  Pulmonary:      Effort: No respiratory distress.      Breath sounds: Normal breath sounds. No wheezing.    Abdominal:      Palpations: Abdomen is soft.      Tenderness: There is no abdominal tenderness. There is no guarding.   Musculoskeletal:         General: No swelling or deformity.   Skin:     General: Skin is warm and dry.   Neurological:      Mental Status: She is alert.   Psychiatric:         Mood and Affect: Mood normal.         Behavior: Behavior normal.          LABS  WBC (x10E9/L)   Date Value   08/11/2023 5.4     Hemoglobin (g/dL)   Date Value   08/11/2023 14.4     Platelets (x10E9/L)   Date Value   08/11/2023 290     Sodium (mmol/L)   Date Value   08/11/2023 141     Potassium (mmol/L)   Date Value   08/11/2023 3.9     Chloride (mmol/L)   Date Value   08/11/2023 102     Bicarbonate (mmol/L)   Date Value   08/11/2023 30     Urea Nitrogen (mg/dL)   Date Value   08/11/2023 21     Creatinine (mg/dL)   Date Value   08/11/2023 0.84     Calcium (mg/dL)   Date Value   08/11/2023 9.3     Total Protein (g/dL)   Date Value   08/11/2023 6.6     Total Bilirubin (mg/dL)   Date Value   08/11/2023 0.5     Alkaline Phosphatase (U/L)   Date Value   08/11/2023 71     ALT (SGPT) (U/L)   Date Value   08/11/2023 12     AST (U/L)   Date Value   08/11/2023 18     Glucose (mg/dL)   Date Value   08/11/2023 84     Cholesterol (mg/dL)   Date Value   08/11/2023 145   05/11/2022 162   05/14/2021 136     HDL (mg/dL)   Date Value   08/11/2023 44.1   05/11/2022 52.0   05/14/2021 46.0     Triglycerides (mg/dL)   Date Value   08/11/2023 75   05/11/2022 76   05/14/2021 69     Hemoglobin A1C (%)   Date Value   09/16/2023 5.5     Lab Results   Component Value Date    LDLF 86 08/11/2023     Lipid panel [facility] 9/16/2023: LDL 72, HDL 46, Tchol 131, TG 65      ASSESSMENT/PLAN  Geri Christianson is a 63 y.o. female with a past medical history of DLD, HTN, recent R ICA aneurysm with rupture causing diffuse SAH (s/p stent placement 9/2023) who presents by PCP referral for follow-up of elevated CAC score of 202 in 6/2023. Risk factors are  optimized on her simvastatin. BP well-controlled with addition of 2.5 amlodipine per PCP. Plan to obtain lipoprotein (a). Follow lipid profile annually.    Follow-up in the office in 12 months.    Andre Young MD  Internal Medicine, PGY-3

## 2024-01-04 LAB — LPA SERPL-MCNC: 6 MG/DL

## 2024-01-05 ENCOUNTER — TELEPHONE (OUTPATIENT)
Dept: CARDIOLOGY | Facility: CLINIC | Age: 64
End: 2024-01-05
Payer: MEDICARE

## 2024-01-05 NOTE — TELEPHONE ENCOUNTER
----- Message from Kandis Benavides MD sent at 1/4/2024  5:34 PM EST -----  Please let patient know lipoprotein a is normal.

## 2024-01-05 NOTE — TELEPHONE ENCOUNTER
Phoned patient and no answer.  Left voicemail with contact number for nursing office ([phone]) and requested patient return call.

## 2024-01-05 NOTE — TELEPHONE ENCOUNTER
Result Communication    Resulted Orders   Lipoprotein a   Result Value Ref Range    Lipoprotein (a) 6 <=29 mg/dL      Comment:      Performed By: [facility]  [address] [place] [address]  : Rah Lindsey MD, PhD  CLIA Number: [id number]       10:22 AM    Patient returned call to nursing office.  Provided patient result from Dr. Benavides and reviewed plan of care per most recent office note 1/2/24. Patient requesting call from Dr. Benavides to further discuss result.  Forwarded this notation to Dr. Benavides.

## 2024-01-09 LAB
ATRIAL RATE: 74 BPM
P AXIS: 26 DEGREES
P OFFSET: 206 MS
P ONSET: 162 MS
PR INTERVAL: 112 MS
Q ONSET: 218 MS
QRS COUNT: 12 BEATS
QRS DURATION: 82 MS
QT INTERVAL: 408 MS
QTC CALCULATION(BAZETT): 452 MS
QTC FREDERICIA: 437 MS
R AXIS: 58 DEGREES
T AXIS: 63 DEGREES
T OFFSET: 422 MS
VENTRICULAR RATE: 74 BPM

## 2024-01-11 ENCOUNTER — PATIENT MESSAGE (OUTPATIENT)
Dept: PRIMARY CARE | Facility: CLINIC | Age: 64
End: 2024-01-11
Payer: MEDICARE

## 2024-01-11 DIAGNOSIS — M25.569 KNEE PAIN, UNSPECIFIED CHRONICITY, UNSPECIFIED LATERALITY: Primary | ICD-10-CM

## 2024-01-12 ENCOUNTER — OFFICE VISIT (OUTPATIENT)
Dept: ORTHOPEDIC SURGERY | Facility: CLINIC | Age: 64
End: 2024-01-12
Payer: MEDICARE

## 2024-01-12 DIAGNOSIS — M23.91 INTERNAL DERANGEMENT OF RIGHT KNEE: ICD-10-CM

## 2024-01-12 DIAGNOSIS — M25.561 ACUTE PAIN OF RIGHT KNEE: ICD-10-CM

## 2024-01-12 PROCEDURE — 99213 OFFICE O/P EST LOW 20 MIN: CPT | Performed by: INTERNAL MEDICINE

## 2024-01-12 PROCEDURE — 1036F TOBACCO NON-USER: CPT | Performed by: INTERNAL MEDICINE

## 2024-01-12 PROCEDURE — 1159F MED LIST DOCD IN RCRD: CPT | Performed by: INTERNAL MEDICINE

## 2024-01-12 PROCEDURE — 1160F RVW MEDS BY RX/DR IN RCRD: CPT | Performed by: INTERNAL MEDICINE

## 2024-01-12 PROCEDURE — 1126F AMNT PAIN NOTED NONE PRSNT: CPT | Performed by: INTERNAL MEDICINE

## 2024-01-12 RX ORDER — DIAZEPAM 5 MG/1
2.5 TABLET ORAL
Qty: 1 TABLET | Refills: 0 | Status: SHIPPED | OUTPATIENT
Start: 2024-01-12 | End: 2024-06-07 | Stop reason: ALTCHOICE

## 2024-01-12 RX ORDER — PREDNISONE 50 MG/1
50 TABLET ORAL DAILY
Qty: 5 TABLET | Refills: 0 | Status: SHIPPED | OUTPATIENT
Start: 2024-01-12 | End: 2024-01-17

## 2024-01-12 NOTE — PROGRESS NOTES
Acute Injury New Patient Visit    CC: No chief complaint on file.      HPI: Geri is a 63 y.o. female who presents for a follow-up for right knee pain. She received a cortisone shot some time in November and did well, however, the pain has since worsened. She denies any chills or fever. She states that she has difficulty ambulating.  And now is noting more mechanical symptoms feel like her knee locks up.  No recent fall or traumatic injury.  No recent infection.        Review of Systems   GENERAL: Negative for malaise, significant weight loss, fever  MUSCULOSKELETAL: See HPI  NEURO:  Negative for numbness / tingling     Past Medical History  Past Medical History:   Diagnosis Date    Conjunctival hemorrhage, left eye 06/19/2015    Subconjunctival hemorrhage of left eye    Other conditions influencing health status 03/31/2017    Mammogram normal    Personal history of other diseases of the nervous system and sense organs 11/28/2012    History of migraine headaches    Personal history of other infectious and parasitic diseases     History of intestinal infection       Medication review  Medication Documentation Review Audit       Reviewed by Kandis Benavides MD (Physician) on 01/02/24 at 1636      Medication Order Taking? Sig Documenting Provider Last Dose Status   ALPRAZolam (Xanax) 0.25 mg tablet [id number] Yes Take 1 tablet (0.25 mg) by mouth every 6 hours during the day. Historical Provider, MD Taking Active   amLODIPine (Norvasc) 2.5 mg tablet [id number] Yes Take 1 tablet (2.5 mg) by mouth once daily. Davon Salcedo,  Taking Active   aspirin 81 mg chewable tablet [id number] Yes Chew 1 tablet (81 mg) once daily. Historical Provider, MD Taking Active   betamethasone acet,sod phos (Celestone) injection 18 mg [id number]   Chance Maldonado MD  Active   clopidogrel (Plavix) 75 mg tablet [id number] Yes Take 1 tablet (75 mg) by mouth once daily. Historical Provider, MD Taking Active   gabapentin (Neurontin) 100 mg  capsule [id number] Yes Take 2 capsules (200 mg) by mouth once daily at bedtime. Davon Salcedo, DO Taking Active   lidocaine (Xylocaine) 10 mg/mL (1 %) injection 3 mL [id number]   Chance Maldonado MD  Active   simvastatin (Zocor) 40 mg tablet [id number] Yes take 1 tablet by mouth in the evening. Davon Salcedo, DO Taking Active   triamterene-hydrochlorothiazid (Dyazide) 37.5-25 mg capsule [id number] Yes Take 1 capsule by mouth once daily. Davon Salcedo, DO Taking Active                    Allergies  Allergies   Allergen Reactions    Adhesive Tape-Silicones Unknown    Epinephrine Unknown    Lidocaine Unknown    Losartan-Hydrochlorothiazide Unknown       Social History  Social History     Socioeconomic History    Marital status:      Spouse name: Not on file    Number of children: Not on file    Years of education: Not on file    Highest education level: Not on file   Occupational History     Employer: [employer]   Tobacco Use    Smoking status: Never    Smokeless tobacco: Never   Vaping Use    Vaping Use: Never used   Substance and Sexual Activity    Alcohol use: Yes    Drug use: Never    Sexual activity: Not on file   Other Topics Concern    Not on file   Social History Narrative    Not on file     Social Determinants of Health     Financial Resource Strain: Not on file   Food Insecurity: Not on file   Transportation Needs: Not on file   Physical Activity: Not on file   Stress: Not on file   Social Connections: Not on file   Intimate Partner Violence: Not on file   Housing Stability: Not on file       Surgical History  Past Surgical History:   Procedure Laterality Date    COLONOSCOPY  11/28/2012    Complete Colonoscopy    COLONOSCOPY  11/28/2012    Complete Colonoscopy    CT HEAD ANGIO W AND WO IV CONTRAST  12/13/2023    CT HEAD ANGIO W AND WO IV CONTRAST 12/13/2023    OTHER SURGICAL HISTORY  11/28/2012    Uterine Myomectomy    OTHER SURGICAL HISTORY  11/28/2012    Cervical Conization By Laser     REFRACTIVE SURGERY  10/11/2013    Corneal LASIK    TONSILLECTOMY  11/28/2012    Tonsillectomy       Physical Exam:  GENERAL:  Patient is awake, alert, and oriented to person place and time.  Patient appears well nourished and well kept.  Affect Calm, Not Acutely Distressed.  HEENT:  Normocephalic, Atraumatic, EOMI  CARDIOVASCULAR:  Hemodynamically stable.  RESPIRATORY:  Normal respirations with unlabored breathing.  Extremity: Right knee shows 1-2+ effusion the right knee.  There is no erythema or warmth.  There is no clinical signs of infection.  She lacks full extension by about 20 degrees.  She can flex her right knee to 90 degrees, and lacks full range of motion due to mechanical obstruction.  Negative valgus and varus stress test.  Unable to perform a satisfactory Lachman's test due to pain and guarding.  Positive Nikko's test medially and laterally with instability.  Patellar and quadricep mechanism intact.  Unable to form a satisfactory anterior and posterior drawer test.  No calf tenderness.  She is neurovascular intact.      Diagnostics: X-rays reviewed      Procedure: None    Assessment:   Right knee pain  Right knee internal derangement    Plan: Geri presents for follow-up for right knee pain with mechanical symptoms. She received cortisone shot in November and was doing well, however, her pain has worsened and now has increased mechanical symptoms. We recommend MRI of the right knee for preoperative planning. We will provide her with a short course of oral prednisone 50 mg 1 tablet daily for 5 days. She will follow up after the MRI, and discuss treatment options pending MRI results.    No orders of the defined types were placed in this encounter.     At the conclusion of the visit there were no further questions by the patient/family regarding their plan of care.  Patient was instructed to call or return with any issues, questions, or concerns regarding their injury and/or treatment plan described  above.     01/12/24 at 8:12 AM - Chance Maldonado MD  Scribe Attestation  By signing my name below, I, Mitchell Jeremy Scribcarly   attest that this documentation has been prepared under the direction and in the presence of Chance Maldonado MD.    Office: [phone]    This note was prepared using voice recognition software.  The details of this note are correct and have been reviewed, and corrected to the best of my ability.  Some grammatical errors may persist related to the Dragon software.

## 2024-01-12 NOTE — LETTER
January 12, 2024     Patient: Geri Christianson   YOB: 1960   Date of Visit: 1/12/2024       To Whom It May Concern:    It is my medical opinion that Geri Christianson may return to light duty immediately with the following restrictions: She may return to work from home work only until further notice .    If you have any questions or concerns, please don't hesitate to call.         Sincerely,        Chance Maldonado MD

## 2024-01-18 ENCOUNTER — ANCILLARY PROCEDURE (OUTPATIENT)
Dept: RADIOLOGY | Facility: CLINIC | Age: 64
End: 2024-01-18
Payer: MEDICARE

## 2024-01-18 DIAGNOSIS — M23.91 INTERNAL DERANGEMENT OF RIGHT KNEE: ICD-10-CM

## 2024-01-18 DIAGNOSIS — M25.561 ACUTE PAIN OF RIGHT KNEE: ICD-10-CM

## 2024-01-18 PROCEDURE — 73721 MRI JNT OF LWR EXTRE W/O DYE: CPT | Mod: RT

## 2024-01-18 PROCEDURE — 73721 MRI JNT OF LWR EXTRE W/O DYE: CPT | Mod: RIGHT SIDE | Performed by: RADIOLOGY

## 2024-01-19 ENCOUNTER — TELEPHONE (OUTPATIENT)
Dept: ORTHOPEDIC SURGERY | Facility: CLINIC | Age: 64
End: 2024-01-19
Payer: MEDICARE

## 2024-01-19 NOTE — TELEPHONE ENCOUNTER
Patient called asking what she could do about her knee because she does not have a follow-up appointment until 515 next Tuesday with Dr. Maldonado.  She is unable to take NSAIDs due to being on blood thinners because of the history of a brain bleed.  She asked for refill on the prednisone but we are unable to do that so soon.  She was told she can take Tylenol, or ask her family doctor if there is something for her safe to take, also was told to keep it elevated brace as needed and ice for the discomfort.

## 2024-01-22 RX ORDER — PREDNISONE 20 MG/1
40 TABLET ORAL DAILY
Qty: 10 TABLET | Refills: 0 | Status: SHIPPED | OUTPATIENT
Start: 2024-01-22 | End: 2024-01-27

## 2024-01-23 ENCOUNTER — OFFICE VISIT (OUTPATIENT)
Dept: ORTHOPEDIC SURGERY | Facility: CLINIC | Age: 64
End: 2024-01-23
Payer: MEDICARE

## 2024-01-23 DIAGNOSIS — S83.289A ACUTE LATERAL MENISCAL TEAR, INITIAL ENCOUNTER: Primary | ICD-10-CM

## 2024-01-23 DIAGNOSIS — M17.11 PRIMARY OSTEOARTHRITIS OF RIGHT KNEE: ICD-10-CM

## 2024-01-23 PROCEDURE — 1036F TOBACCO NON-USER: CPT | Performed by: INTERNAL MEDICINE

## 2024-01-23 PROCEDURE — 99213 OFFICE O/P EST LOW 20 MIN: CPT | Performed by: INTERNAL MEDICINE

## 2024-01-23 PROCEDURE — 1126F AMNT PAIN NOTED NONE PRSNT: CPT | Performed by: INTERNAL MEDICINE

## 2024-01-23 PROCEDURE — 1159F MED LIST DOCD IN RCRD: CPT | Performed by: INTERNAL MEDICINE

## 2024-01-23 PROCEDURE — 1160F RVW MEDS BY RX/DR IN RCRD: CPT | Performed by: INTERNAL MEDICINE

## 2024-01-23 NOTE — PROGRESS NOTES
CC:   Chief Complaint   Patient presents with    Right Knee - Follow-up     Mri review       HPI: Geri is a 63 y.o. female who presents for follow-up for MRI results for the reevaluation for right knee pain and right knee internal derangement. She reports some tightness on the back of her right knee.  She was prescribed additional oral steroids from her physician.  She is feeling better today.  Denies any instability.        Review of Systems   GENERAL: Negative for malaise, significant weight loss, fever  MUSCULOSKELETAL: See HPI  NEURO:  Negative for numbness / tingling     Past Medical History  Past Medical History:   Diagnosis Date    Conjunctival hemorrhage, left eye 06/19/2015    Subconjunctival hemorrhage of left eye    Other conditions influencing health status 03/31/2017    Mammogram normal    Personal history of other diseases of the nervous system and sense organs 11/28/2012    History of migraine headaches    Personal history of other infectious and parasitic diseases     History of intestinal infection       Medication review  Medication Documentation Review Audit       Reviewed by Kandis Benavides MD (Physician) on 01/02/24 at 1636      Medication Order Taking? Sig Documenting Provider Last Dose Status   ALPRAZolam (Xanax) 0.25 mg tablet [id number] Yes Take 1 tablet (0.25 mg) by mouth every 6 hours during the day. Historical Provider, MD Taking Active   amLODIPine (Norvasc) 2.5 mg tablet [id number] Yes Take 1 tablet (2.5 mg) by mouth once daily. Davon Salcedo,  Taking Active   aspirin 81 mg chewable tablet [id number] Yes Chew 1 tablet (81 mg) once daily. Historical Provider, MD Taking Active   betamethasone acet,sod phos (Celestone) injection 18 mg [id number]   Chance Maldonado MD  Active   clopidogrel (Plavix) 75 mg tablet [id number] Yes Take 1 tablet (75 mg) by mouth once daily. Historical Provider, MD Taking Active   gabapentin (Neurontin) 100 mg capsule [id number] Yes Take 2 capsules (200  mg) by mouth once daily at bedtime. Davon Salcedo, DO Taking Active   lidocaine (Xylocaine) 10 mg/mL (1 %) injection 3 mL [id number]   Chance Maldonado MD  Active   simvastatin (Zocor) 40 mg tablet [id number] Yes take 1 tablet by mouth in the evening. Davon Salcedo, DO Taking Active   triamterene-hydrochlorothiazid (Dyazide) 37.5-25 mg capsule [id number] Yes Take 1 capsule by mouth once daily. Davon Salcedo, DO Taking Active                    Allergies  Allergies   Allergen Reactions    Adhesive Tape-Silicones Unknown    Epinephrine Unknown    Lidocaine Unknown    Losartan-Hydrochlorothiazide Unknown       Social History  Social History     Socioeconomic History    Marital status:      Spouse name: Not on file    Number of children: Not on file    Years of education: Not on file    Highest education level: Not on file   Occupational History     Employer: [employer]   Tobacco Use    Smoking status: Never    Smokeless tobacco: Never   Vaping Use    Vaping Use: Never used   Substance and Sexual Activity    Alcohol use: Yes    Drug use: Never    Sexual activity: Not on file   Other Topics Concern    Not on file   Social History Narrative    Not on file     Social Determinants of Health     Financial Resource Strain: Not on file   Food Insecurity: Not on file   Transportation Needs: Not on file   Physical Activity: Not on file   Stress: Not on file   Social Connections: Not on file   Intimate Partner Violence: Not on file   Housing Stability: Not on file       Surgical History  Past Surgical History:   Procedure Laterality Date    COLONOSCOPY  11/28/2012    Complete Colonoscopy    COLONOSCOPY  11/28/2012    Complete Colonoscopy    CT HEAD ANGIO W AND WO IV CONTRAST  12/13/2023    CT HEAD ANGIO W AND WO IV CONTRAST 12/13/2023    OTHER SURGICAL HISTORY  11/28/2012    Uterine Myomectomy    OTHER SURGICAL HISTORY  11/28/2012    Cervical Conization By Laser    REFRACTIVE SURGERY  10/11/2013    Corneal LASIK     TONSILLECTOMY  11/28/2012    Tonsillectomy       Physical Exam:  GENERAL:  Patient is awake, alert, and oriented to person place and time.  Patient appears well nourished and well kept.  Affect Calm, Not Acutely Distressed.  HEENT:  Normocephalic, Atraumatic, EOMI  CARDIOVASCULAR:  Hemodynamically stable.  RESPIRATORY:  Normal respirations with unlabored breathing.  Extremity: Right knee shows skin is intact.  Trace to 1+ effusion.  Small palpable Baker's cyst posteriorly.  She can flex the right knee to 125 degrees with pain.  Full extension at 0 degrees.  Negative valgus and varus stress test.  Positive Nikko's test laterally.  Some mild pain in the medial and lateral joint line.  Patellar and quadricep mechanism intact.  Negative Lachman's test.  Left knee was examined for comparison.      Diagnostics: MRI reviewed  MR knee right wo IV contrast  Narrative: Interpreted By:  Sebastian Covarrubias,   STUDY:  MR KNEE RIGHT WO IV CONTRAST;  1/18/2024 9:18 am      INDICATION:  Signs/Symptoms:pain.      COMPARISON:  None.      ACCESSION NUMBER(S):  [id number]      ORDERING CLINICIAN:  THIAGO FARMER      TECHNIQUE:  Multiplanar and multisequential MR images of the right knee are  performed.      FINDINGS:  There is a small joint effusion. There is a small to moderate-sized  Baker's cyst with thin internal septations. There is ill-defined  fluid and edema extending superiorly surrounding the distal  semimembranosus muscle belly and myotendinous junction which could  reflect a component of cyst rupture. There is a fat intensity  intramuscular mass in the distal semimembranosus which measures at  least 4 cm in craniocaudal diameter and 2.5 x 2.2 cm in  cross-section. There is no significant internal nodularity or edema.  There is no internal fluid signal or calcification.      There are mild changes of tricompartmental osteoarthritis with  diffuse irregular thinning of the articular cartilage and joint  space  narrowing, more pronounced in the medial compartment and  patellofemoral joint medially. There is minor subcortical  degenerative signal and cystic changes at the lateral patellar facet  and medial femoral trochlea. There is no loose osteochondral lesion.  There is no sign of acute osseous fracture or osseous mass.      The lateral meniscus is of normal morphology. There is faint  horizontal linear signal within the posterior horn and body without  clear violation of the articular surface. On a single sagittal image  there is minimal increased signal and truncation at the free edge of  the posterior horn at its junction with the body of the meniscus. A  tiny radial tear can not be excluded. The remainder of the meniscus  is unremarkable.      The medial meniscus demonstrates some amorphous intermediate signal  centrally in the posterior horn. There is no linear tear or  truncation.      The anterior and posterior cruciate ligaments, lateral collateral  ligament complex, medial collateral ligament, extensor complex, and  patellar retinacula are intact. There is some fluid surrounding the  popliteus myotendinous junction. The tendon is intact.      Impression: Tricompartmental osteoarthritis.      Equivocal tiny radial tear along the free edge of the lateral  meniscus at the junction of the posterior horn and body.      No sign of acute ligament disruption.      Moderate size septated Baker's cyst. There is ill-defined fluid and  edema surrounding the distal semimembranosus muscle belly which could  reflect a component of rupture.      Incidental 4.0 x 2.5 x 2.2 cm intramuscular lipoma in the distal  semimembranosus.      No acute osseous abnormality.      Signed by: Sebastian Covarrubias 1/18/2024 9:39 AM  Dictation workstation:   [id number]        Procedure: None    Assessment:   Right knee ostearthritis  Right knee lateral meniscal tear      Plan: Geri presents today for reevaluation for right knee pain.  MRI revealed right knee lateral meniscal tear and right knee osteoarthritis. We discussed conservative and surgical management options.  She is having minimal mechanical symptoms, we discussed surgical intervention would entail arthroscopic meniscectomy, which may worsen her underlying knee arthritis.  She opted for viscosupplementation injections to the right knee. We also recommended physical therapy.  She will follow-up once the gel injection improved.    In conclusion, this patient has OA of the knee which is symptomatic causing significant morning stiffness lasting up to an hour with popping, clicking, grinding with ROM, which is worse with prolonged standing or going up/down stairs.  This affects functional activities and ADLs.  There is radiographic evidence of OA with joint space narrowing and marginal osteophyte formation.  This patient has also failed pharmacologic treatment for OA including OTC analgesics, antiinflammatory medications, as well as intraarticular corticosteroid injections.  For these reasons, I feel the patient is a candidate for viscosupplementation injections of the right knee.     No orders of the defined types were placed in this encounter.     At the conclusion of the visit there were no further questions by the patient/family regarding their plan of care.  Patient was instructed to call or return with any issues, questions, or concerns regarding their injury and/or treatment plan described above.     01/23/24 at 5:26 PM - Chance Maldonado MD  Scribe Attestation  By signing my name below, I, Mitchell Jeremy Scrraji   attest that this documentation has been prepared under the direction and in the presence of Chance Maldonado MD.    Office: [phone]    This note was prepared using voice recognition software.  The details of this note are correct and have been reviewed, and corrected to the best of my ability.  Some grammatical errors may persist related to the Dragon software.

## 2024-01-26 ENCOUNTER — APPOINTMENT (OUTPATIENT)
Dept: RADIOLOGY | Facility: CLINIC | Age: 64
End: 2024-01-26
Payer: MEDICARE

## 2024-02-01 ENCOUNTER — OFFICE VISIT (OUTPATIENT)
Dept: ORTHOPEDIC SURGERY | Facility: CLINIC | Age: 64
End: 2024-02-01
Payer: MEDICARE

## 2024-02-01 DIAGNOSIS — M17.11 PRIMARY OSTEOARTHRITIS OF RIGHT KNEE: ICD-10-CM

## 2024-02-01 PROCEDURE — 1126F AMNT PAIN NOTED NONE PRSNT: CPT | Performed by: INTERNAL MEDICINE

## 2024-02-01 PROCEDURE — 20610 DRAIN/INJ JOINT/BURSA W/O US: CPT | Performed by: INTERNAL MEDICINE

## 2024-02-01 PROCEDURE — 1159F MED LIST DOCD IN RCRD: CPT | Performed by: INTERNAL MEDICINE

## 2024-02-01 PROCEDURE — 1160F RVW MEDS BY RX/DR IN RCRD: CPT | Performed by: INTERNAL MEDICINE

## 2024-02-01 PROCEDURE — 1036F TOBACCO NON-USER: CPT | Performed by: INTERNAL MEDICINE

## 2024-02-01 PROCEDURE — 99024 POSTOP FOLLOW-UP VISIT: CPT | Performed by: INTERNAL MEDICINE

## 2024-02-01 NOTE — PROGRESS NOTES
CC:   Chief Complaint   Patient presents with   • Right Knee - Injections     Pt presents today for synvisc#1 inj to Rt knee          HPI: Geri is a 63 y.o. female presents today for first Synvisc injection of the right knee.        Review of Systems   GENERAL: Negative for malaise, significant weight loss, fever  MUSCULOSKELETAL: See HPI  NEURO:  Negative for numbness / tingling     Past Medical History  Past Medical History:   Diagnosis Date   • Conjunctival hemorrhage, left eye 06/19/2015    Subconjunctival hemorrhage of left eye   • Other conditions influencing health status 03/31/2017    Mammogram normal   • Personal history of other diseases of the nervous system and sense organs 11/28/2012    History of migraine headaches   • Personal history of other infectious and parasitic diseases     History of intestinal infection       Medication review  Medication Documentation Review Audit       Reviewed by Kandis Benavides MD (Physician) on 01/02/24 at 1636      Medication Order Taking? Sig Documenting Provider Last Dose Status   ALPRAZolam (Xanax) 0.25 mg tablet [id number] Yes Take 1 tablet (0.25 mg) by mouth every 6 hours during the day. Historical Provider, MD Taking Active   amLODIPine (Norvasc) 2.5 mg tablet [id number] Yes Take 1 tablet (2.5 mg) by mouth once daily. Davon Salcedo DO Taking Active   aspirin 81 mg chewable tablet [id number] Yes Chew 1 tablet (81 mg) once daily. Historical Provider, MD Taking Active   betamethasone acet,sod phos (Celestone) injection 18 mg [id number]   Chance Maldonado MD  Active   clopidogrel (Plavix) 75 mg tablet [id number] Yes Take 1 tablet (75 mg) by mouth once daily. Historical Provider, MD Taking Active   gabapentin (Neurontin) 100 mg capsule [id number] Yes Take 2 capsules (200 mg) by mouth once daily at bedtime. Davon Salcedo DO Taking Active   lidocaine (Xylocaine) 10 mg/mL (1 %) injection 3 mL [id number]   Chance Maldonado MD  Active   simvastatin (Zocor) 40  mg tablet [id number] Yes take 1 tablet by mouth in the evening. Davon Salcedo, DO Taking Active   triamterene-hydrochlorothiazid (Dyazide) 37.5-25 mg capsule [id number] Yes Take 1 capsule by mouth once daily. Davon Salcedo, DO Taking Active                    Allergies  Allergies   Allergen Reactions   • Adhesive Tape-Silicones Unknown   • Epinephrine Unknown   • Lidocaine Unknown   • Losartan-Hydrochlorothiazide Unknown       Social History  Social History     Socioeconomic History   • Marital status:      Spouse name: Not on file   • Number of children: Not on file   • Years of education: Not on file   • Highest education level: Not on file   Occupational History     Employer: [employer]   Tobacco Use   • Smoking status: Never   • Smokeless tobacco: Never   Vaping Use   • Vaping Use: Never used   Substance and Sexual Activity   • Alcohol use: Yes   • Drug use: Never   • Sexual activity: Not on file   Other Topics Concern   • Not on file   Social History Narrative   • Not on file     Social Determinants of Health     Financial Resource Strain: Not on file   Food Insecurity: Not on file   Transportation Needs: Not on file   Physical Activity: Not on file   Stress: Not on file   Social Connections: Not on file   Intimate Partner Violence: Not on file   Housing Stability: Not on file       Surgical History  Past Surgical History:   Procedure Laterality Date   • COLONOSCOPY  11/28/2012    Complete Colonoscopy   • COLONOSCOPY  11/28/2012    Complete Colonoscopy   • CT HEAD ANGIO W AND WO IV CONTRAST  12/13/2023    CT HEAD ANGIO W AND WO IV CONTRAST 12/13/2023   • OTHER SURGICAL HISTORY  11/28/2012    Uterine Myomectomy   • OTHER SURGICAL HISTORY  11/28/2012    Cervical Conization By Laser   • REFRACTIVE SURGERY  10/11/2013    Corneal LASIK   • TONSILLECTOMY  11/28/2012    Tonsillectomy       Physical Exam:  GENERAL:  Patient is awake, alert, and oriented to person place and time.  Patient appears well  nourished and well kept.  Affect Calm, Not Acutely Distressed.  HEENT:  Normocephalic, Atraumatic, EOMI  CARDIOVASCULAR:  Hemodynamically stable.  RESPIRATORY:  Normal respirations with unlabored breathing.  Extremity: Right knee shows skin is intact.  There is no erythema warmth.  There is no clinical signs of infection.      Diagnostics: None today  MR knee right wo IV contrast  Narrative: Interpreted By:  Sebastian Covarrubias,   STUDY:  MR KNEE RIGHT WO IV CONTRAST;  1/18/2024 9:18 am      INDICATION:  Signs/Symptoms:pain.      COMPARISON:  None.      ACCESSION NUMBER(S):  [id number]      ORDERING CLINICIAN:  THIAGO FARMER      TECHNIQUE:  Multiplanar and multisequential MR images of the right knee are  performed.      FINDINGS:  There is a small joint effusion. There is a small to moderate-sized  Baker's cyst with thin internal septations. There is ill-defined  fluid and edema extending superiorly surrounding the distal  semimembranosus muscle belly and myotendinous junction which could  reflect a component of cyst rupture. There is a fat intensity  intramuscular mass in the distal semimembranosus which measures at  least 4 cm in craniocaudal diameter and 2.5 x 2.2 cm in  cross-section. There is no significant internal nodularity or edema.  There is no internal fluid signal or calcification.      There are mild changes of tricompartmental osteoarthritis with  diffuse irregular thinning of the articular cartilage and joint space  narrowing, more pronounced in the medial compartment and  patellofemoral joint medially. There is minor subcortical  degenerative signal and cystic changes at the lateral patellar facet  and medial femoral trochlea. There is no loose osteochondral lesion.  There is no sign of acute osseous fracture or osseous mass.      The lateral meniscus is of normal morphology. There is faint  horizontal linear signal within the posterior horn and body without  clear violation of the articular  surface. On a single sagittal image  there is minimal increased signal and truncation at the free edge of  the posterior horn at its junction with the body of the meniscus. A  tiny radial tear can not be excluded. The remainder of the meniscus  is unremarkable.      The medial meniscus demonstrates some amorphous intermediate signal  centrally in the posterior horn. There is no linear tear or  truncation.      The anterior and posterior cruciate ligaments, lateral collateral  ligament complex, medial collateral ligament, extensor complex, and  patellar retinacula are intact. There is some fluid surrounding the  popliteus myotendinous junction. The tendon is intact.      Impression: Tricompartmental osteoarthritis.      Equivocal tiny radial tear along the free edge of the lateral  meniscus at the junction of the posterior horn and body.      No sign of acute ligament disruption.      Moderate size septated Baker's cyst. There is ill-defined fluid and  edema surrounding the distal semimembranosus muscle belly which could  reflect a component of rupture.      Incidental 4.0 x 2.5 x 2.2 cm intramuscular lipoma in the distal  semimembranosus.      No acute osseous abnormality.      Signed by: Sebastian Covarrubias 1/18/2024 9:39 AM  Dictation workstation:   [id number]        Procedure: L Inj/Asp: R knee on 2/1/2024 3:47 PM  Indications: pain  Details: 22 G needle, lateral approach  Medications: 2 mL hylan 16 mg/2 mL  Procedure, treatment alternatives, risks and benefits explained, specific risks discussed. Consent was given by the patient. Immediately prior to procedure a time out was called to verify the correct patient, procedure, equipment, support staff and site/side marked as required. Patient was prepped and draped in the usual sterile fashion.           Assessment:  1.  Right knee osteoarthritis  2.  Right knee lateral meniscal tear    Plan: Geri presents today for first Synvisc injection of the right knee,  she tolerated first injections well.  She will follow-up next week for second injection.    Orders Placed This Encounter   • hylan (Synvisc) injection 16 mg      At the conclusion of the visit there were no further questions by the patient/family regarding their plan of care.  Patient was instructed to call or return with any issues, questions, or concerns regarding their injury and/or treatment plan described above.     02/01/24 at 3:02 PM - Chance Maldonado MD    Office: [phone]    This note was prepared using voice recognition software.  The details of this note are correct and have been reviewed, and corrected to the best of my ability.  Some grammatical errors may persist related to the Dragon software.

## 2024-02-08 ENCOUNTER — OFFICE VISIT (OUTPATIENT)
Dept: ORTHOPEDIC SURGERY | Facility: CLINIC | Age: 64
End: 2024-02-08
Payer: MEDICARE

## 2024-02-08 DIAGNOSIS — M17.11 PRIMARY OSTEOARTHRITIS OF RIGHT KNEE: Primary | ICD-10-CM

## 2024-02-08 PROCEDURE — 99024 POSTOP FOLLOW-UP VISIT: CPT | Performed by: INTERNAL MEDICINE

## 2024-02-08 PROCEDURE — 1159F MED LIST DOCD IN RCRD: CPT | Performed by: INTERNAL MEDICINE

## 2024-02-08 PROCEDURE — 20610 DRAIN/INJ JOINT/BURSA W/O US: CPT | Performed by: INTERNAL MEDICINE

## 2024-02-08 PROCEDURE — 1160F RVW MEDS BY RX/DR IN RCRD: CPT | Performed by: INTERNAL MEDICINE

## 2024-02-08 PROCEDURE — 1036F TOBACCO NON-USER: CPT | Performed by: INTERNAL MEDICINE

## 2024-02-08 PROCEDURE — 1126F AMNT PAIN NOTED NONE PRSNT: CPT | Performed by: INTERNAL MEDICINE

## 2024-02-08 NOTE — PROGRESS NOTES
CC:   Chief Complaint   Patient presents with    Right Knee - Injections     Pt presents today for synvisc#2 inj to Rt knee          HPI: Geri is a 63 y.o. female presents today for the second Synvisc injection of the right knee.         Review of Systems   GENERAL: Negative for malaise, significant weight loss, fever  MUSCULOSKELETAL: See HPI  NEURO:  Negative for numbness / tingling     Past Medical History  Past Medical History:   Diagnosis Date    Conjunctival hemorrhage, left eye 06/19/2015    Subconjunctival hemorrhage of left eye    Other conditions influencing health status 03/31/2017    Mammogram normal    Personal history of other diseases of the nervous system and sense organs 11/28/2012    History of migraine headaches    Personal history of other infectious and parasitic diseases     History of intestinal infection       Medication review  Medication Documentation Review Audit       Reviewed by Kandis Benavides MD (Physician) on 01/02/24 at 1636      Medication Order Taking? Sig Documenting Provider Last Dose Status   ALPRAZolam (Xanax) 0.25 mg tablet [id number] Yes Take 1 tablet (0.25 mg) by mouth every 6 hours during the day. Historical MD Tamara Taking Active   amLODIPine (Norvasc) 2.5 mg tablet [id number] Yes Take 1 tablet (2.5 mg) by mouth once daily. Davon Salcedo DO Taking Active   aspirin 81 mg chewable tablet [id number] Yes Chew 1 tablet (81 mg) once daily. Historical Provider, MD Taking Active   betamethasone acet,sod phos (Celestone) injection 18 mg [id number]   Chance Maldonado MD  Active   clopidogrel (Plavix) 75 mg tablet [id number] Yes Take 1 tablet (75 mg) by mouth once daily. Shamika Provider, MD Taking Active   gabapentin (Neurontin) 100 mg capsule [id number] Yes Take 2 capsules (200 mg) by mouth once daily at bedtime. Davon Salcedo DO Taking Active   lidocaine (Xylocaine) 10 mg/mL (1 %) injection 3 mL [id number]   Chance Maldonado MD  Active   simvastatin (Zocor) 40  mg tablet [id number] Yes take 1 tablet by mouth in the evening. Davon Salcedo, DO Taking Active   triamterene-hydrochlorothiazid (Dyazide) 37.5-25 mg capsule [id number] Yes Take 1 capsule by mouth once daily. Davon DEL REAL Matias, DO Taking Active                    Allergies  Allergies   Allergen Reactions    Adhesive Tape-Silicones Unknown    Epinephrine Unknown    Lidocaine Unknown    Losartan-Hydrochlorothiazide Unknown       Social History  Social History     Socioeconomic History    Marital status:      Spouse name: Not on file    Number of children: Not on file    Years of education: Not on file    Highest education level: Not on file   Occupational History     Employer: [employer]   Tobacco Use    Smoking status: Never    Smokeless tobacco: Never   Vaping Use    Vaping Use: Never used   Substance and Sexual Activity    Alcohol use: Yes    Drug use: Never    Sexual activity: Not on file   Other Topics Concern    Not on file   Social History Narrative    Not on file     Social Determinants of Health     Financial Resource Strain: Not on file   Food Insecurity: Not on file   Transportation Needs: Not on file   Physical Activity: Not on file   Stress: Not on file   Social Connections: Not on file   Intimate Partner Violence: Not on file   Housing Stability: Not on file       Surgical History  Past Surgical History:   Procedure Laterality Date    COLONOSCOPY  11/28/2012    Complete Colonoscopy    COLONOSCOPY  11/28/2012    Complete Colonoscopy    CT HEAD ANGIO W AND WO IV CONTRAST  12/13/2023    CT HEAD ANGIO W AND WO IV CONTRAST 12/13/2023    OTHER SURGICAL HISTORY  11/28/2012    Uterine Myomectomy    OTHER SURGICAL HISTORY  11/28/2012    Cervical Conization By Laser    REFRACTIVE SURGERY  10/11/2013    Corneal LASIK    TONSILLECTOMY  11/28/2012    Tonsillectomy       Physical Exam:  GENERAL:  Patient is awake, alert, and oriented to person place and time.  Patient appears well nourished and well kept.   Affect Calm, Not Acutely Distressed.  HEENT:  Normocephalic, Atraumatic, EOMI  CARDIOVASCULAR:  Hemodynamically stable.  RESPIRATORY:  Normal respirations with unlabored breathing.  Extremity: Right knee shows skin is intact. There is no erythema warmth. There is no clinical signs of infection.       Diagnostics: None today  MR knee right wo IV contrast  Narrative: Interpreted By:  Sebastian Covarrubias,   STUDY:  MR KNEE RIGHT WO IV CONTRAST;  1/18/2024 9:18 am      INDICATION:  Signs/Symptoms:pain.      COMPARISON:  None.      ACCESSION NUMBER(S):  [id number]      ORDERING CLINICIAN:  THIAGO FARMER      TECHNIQUE:  Multiplanar and multisequential MR images of the right knee are  performed.      FINDINGS:  There is a small joint effusion. There is a small to moderate-sized  Baker's cyst with thin internal septations. There is ill-defined  fluid and edema extending superiorly surrounding the distal  semimembranosus muscle belly and myotendinous junction which could  reflect a component of cyst rupture. There is a fat intensity  intramuscular mass in the distal semimembranosus which measures at  least 4 cm in craniocaudal diameter and 2.5 x 2.2 cm in  cross-section. There is no significant internal nodularity or edema.  There is no internal fluid signal or calcification.      There are mild changes of tricompartmental osteoarthritis with  diffuse irregular thinning of the articular cartilage and joint space  narrowing, more pronounced in the medial compartment and  patellofemoral joint medially. There is minor subcortical  degenerative signal and cystic changes at the lateral patellar facet  and medial femoral trochlea. There is no loose osteochondral lesion.  There is no sign of acute osseous fracture or osseous mass.      The lateral meniscus is of normal morphology. There is faint  horizontal linear signal within the posterior horn and body without  clear violation of the articular surface. On a single sagittal  image  there is minimal increased signal and truncation at the free edge of  the posterior horn at its junction with the body of the meniscus. A  tiny radial tear can not be excluded. The remainder of the meniscus  is unremarkable.      The medial meniscus demonstrates some amorphous intermediate signal  centrally in the posterior horn. There is no linear tear or  truncation.      The anterior and posterior cruciate ligaments, lateral collateral  ligament complex, medial collateral ligament, extensor complex, and  patellar retinacula are intact. There is some fluid surrounding the  popliteus myotendinous junction. The tendon is intact.      Impression: Tricompartmental osteoarthritis.      Equivocal tiny radial tear along the free edge of the lateral  meniscus at the junction of the posterior horn and body.      No sign of acute ligament disruption.      Moderate size septated Baker's cyst. There is ill-defined fluid and  edema surrounding the distal semimembranosus muscle belly which could  reflect a component of rupture.      Incidental 4.0 x 2.5 x 2.2 cm intramuscular lipoma in the distal  semimembranosus.      No acute osseous abnormality.      Signed by: Sebastian Covarrubias 1/18/2024 9:39 AM  Dictation workstation:   [id number]        Procedure: L Inj/Asp: R knee on 2/8/2024 3:13 PM  Indications: pain  Details: 22 G needle, lateral approach  Medications: 2 mL hylan 16 mg/2 mL  Outcome: tolerated well, no immediate complications  Procedure, treatment alternatives, risks and benefits explained, specific risks discussed. Consent was given by the patient. Immediately prior to procedure a time out was called to verify the correct patient, procedure, equipment, support staff and site/side marked as required. Patient was prepped and draped in the usual sterile fashion.             Assessment:   1.  Right knee osteoarthritis  2.  Right knee lateral meniscal tear    Plan: Geri presents today for the second Synvisc  injection of the right knee, she tolerated second injection well. She will follow-up next week for the third injection.     Orders Placed This Encounter    hylan (Synvisc) injection 16 mg      At the conclusion of the visit there were no further questions by the patient/family regarding their plan of care.  Patient was instructed to call or return with any issues, questions, or concerns regarding their injury and/or treatment plan described above.     02/08/24 at 3:07 PM - Chance Maldonado MD  Scribe Attestation  By signing my name below, I, Mitchell Coronamo, Scribe   attest that this documentation has been prepared under the direction and in the presence of Chance Maldonado MD.    Office: [phone]    This note was prepared using voice recognition software.  The details of this note are correct and have been reviewed, and corrected to the best of my ability.  Some grammatical errors may persist related to the Dragon software.

## 2024-02-17 ENCOUNTER — PATIENT MESSAGE (OUTPATIENT)
Dept: PRIMARY CARE | Facility: CLINIC | Age: 64
End: 2024-02-17
Payer: MEDICARE

## 2024-02-19 ENCOUNTER — TELEMEDICINE (OUTPATIENT)
Dept: PRIMARY CARE | Facility: CLINIC | Age: 64
End: 2024-02-19
Payer: MEDICARE

## 2024-02-19 DIAGNOSIS — J01.90 ACUTE NON-RECURRENT SINUSITIS, UNSPECIFIED LOCATION: Primary | ICD-10-CM

## 2024-02-19 PROBLEM — G91.1 OBSTRUCTIVE HYDROCEPHALUS (MULTI): Status: RESOLVED | Noted: 2023-09-15 | Resolved: 2024-02-19

## 2024-02-19 PROCEDURE — 1160F RVW MEDS BY RX/DR IN RCRD: CPT | Performed by: INTERNAL MEDICINE

## 2024-02-19 PROCEDURE — 1159F MED LIST DOCD IN RCRD: CPT | Performed by: INTERNAL MEDICINE

## 2024-02-19 PROCEDURE — 1036F TOBACCO NON-USER: CPT | Performed by: INTERNAL MEDICINE

## 2024-02-19 PROCEDURE — 99213 OFFICE O/P EST LOW 20 MIN: CPT | Performed by: INTERNAL MEDICINE

## 2024-02-19 PROCEDURE — 1126F AMNT PAIN NOTED NONE PRSNT: CPT | Performed by: INTERNAL MEDICINE

## 2024-02-19 RX ORDER — AMOXICILLIN AND CLAVULANATE POTASSIUM 875; 125 MG/1; MG/1
875 TABLET, FILM COATED ORAL 2 TIMES DAILY
Qty: 20 TABLET | Refills: 0 | Status: SHIPPED | OUTPATIENT
Start: 2024-02-19 | End: 2024-02-29

## 2024-02-19 ASSESSMENT — ENCOUNTER SYMPTOMS: SINUS PRESSURE: 1

## 2024-02-19 NOTE — PROGRESS NOTES
Patient doing a virtual visit for a URI x 3 days   Virtual or Telephone Consent    An interactive audio and video telecommunication system which permits real time communications between the patient (at the originating site) and provider (at the distant site) was utilized to provide this telehealth service.   Verbal consent was requested and obtained from Geri Christianson on this date, 02/19/24 for a telehealth visit.      Subjective   Patient ID: Geri Christianson is a 63 y.o. female who presents for URI.    The patient reports sinus congestion and pressure since about 2/16/2024.  She has been taking Flonase and Quercetin supplementation with minimal benefit.  She did not take a Covid-19 test to date as she has had difficulty accessing a kit in the Pharmacy.  The patient denies any known penicillin allergy.    URI   Associated symptoms include congestion.     Review of Systems   HENT:  Positive for congestion and sinus pressure.      Objective   Physical Exam  Physical examination not done.    Assessment/Plan   Problem List Items Addressed This Visit    None  Visit Diagnoses         Codes    Acute non-recurrent sinusitis, unspecified location    -  Primary J01.90    Relevant Medications    amoxicillin-pot clavulanate (Augmentin) 875-125 mg tablet            IMPRESSIONS/PLAN :       Acute Sinusitis  - Prescribed amoxicillin pot-clavulanate 875-125mg BID for 10 days to be taken with food and a probiotic OTC to avoid adverse effects.  Continue with Flonase as directed on packaging and Quercetin.  Advised patient to do an at home Covid-19 test.  Drink plenty of water to remain well hydrated, and rest as much as possible.  Wrote a clinical letter for employment purposes.  Call the clinic if symptoms persist or worsen.     HTN   - Home readings in the 160s/90smmHg per patient. Prescribed amlodipine 2.5mg once daily, and instructed patient to confirm with Neurosurgery this is okay before starting medication  and she verbalized agreement.  Also advised to ask about goal blood pressure given history of SAH.  Continue on triamterene-HCTZ 37.5-25mg QD.  Call the clinic with any concerns.      HLD   - Stable per 9/2023.  Continue on simvastatin 40mg QD.  ASCVD 5.8% 12/2023.  CT Cardiac Score 202.43 per 8/2023.     Depression  - Previously ordered referral for Adult Psychology with Laura E[facility]LY  Call the clinic if symptoms persist or worsen.    Thyroid Nodule   - US thyroid 1/2021 showed stable, subcentimeter nodules of left gland. Stable per 11/2022 repeat.     Mediastinal Cyst  - CT Chest 11/2023 showed cystic lesion in the anterior mediastinum which is unchanged when  compared to the previous cardiac score CT of 08/11/2023. Differential considerations include a forget duplication cyst, pericardial cyst,lymphangioma, thymic cyst, etc.  Reviewed with patient, and will consult with Radiology regarding next steps.      Right Hemisphere SAH  - Occurred 9/15/2023, treated with DSA with pipeline shield flow diverter stent embolization.  Continue with clopidogrel 75mg once daily, and ASA 81mg once daily.  Following with  from Neurology at [facility], and  from Endovascular Neurosurgery.      Mammogram   - 11/25/2022 screening test found probably benign calcifications, and radiology recommended repeat in 6 months.  Benign per 6/2023 repeat.  Due in 11/2023 for bilateral screening.     Shingles   - Can continue to take Gabapentin 100 mg as two tablets at bedtime as needed.     Seasonal Allergies  - Advised the patient to try Xyzal as directed on the packaging over the counter.  Call the clinic if symptoms persist or worsen.     Health Maintenance   - Routine labs 9/2023.  Last Mammogram 6/2023.  Last colonoscopy performed 6/2021, due for repeat 2031.  Patient received high-dose Influenza vaccine in the clinic today, tolerated well.      Follow-up according to regular health maintenance, call sooner if needed.         Scribe Attestation  By signing my name below, I, Benito Ortega   attest that this documentation has been prepared under the direction and in the presence of Davon Salcedo DO.   Sadaf Barahona 02/19/24 8:30 AM

## 2024-02-19 NOTE — LETTER
February 19, 2024     Geri Christianson  [address] [facility] [address]    Patient: Geri Christianson   YOB: 1960   Date of Visit: 2/19/2024       To Whom it May Concern:    Geri was seen virtually in my office on 2/19/24.  I have advised her to return to work on Thursday, 2/22/24.  If you have any questions please don't hesitate to contact me.  Thanks.         Sincerely,     Davon Salcedo DO      CC: No Recipients  ______________________________________________________________________________________

## 2024-02-22 ENCOUNTER — LAB (OUTPATIENT)
Dept: LAB | Facility: LAB | Age: 64
End: 2024-02-22
Payer: MEDICARE

## 2024-02-22 ENCOUNTER — APPOINTMENT (OUTPATIENT)
Dept: ORTHOPEDIC SURGERY | Facility: CLINIC | Age: 64
End: 2024-02-22
Payer: MEDICARE

## 2024-02-22 ENCOUNTER — OFFICE VISIT (OUTPATIENT)
Dept: PRIMARY CARE | Facility: CLINIC | Age: 64
End: 2024-02-22
Payer: MEDICARE

## 2024-02-22 VITALS
RESPIRATION RATE: 16 BRPM | WEIGHT: 143.8 LBS | BODY MASS INDEX: 28.23 KG/M2 | HEART RATE: 83 BPM | DIASTOLIC BLOOD PRESSURE: 80 MMHG | OXYGEN SATURATION: 98 % | TEMPERATURE: 97.4 F | HEIGHT: 60 IN | SYSTOLIC BLOOD PRESSURE: 122 MMHG

## 2024-02-22 DIAGNOSIS — R53.1 WEAKNESS: ICD-10-CM

## 2024-02-22 DIAGNOSIS — R53.1 WEAKNESS: Primary | ICD-10-CM

## 2024-02-22 LAB
ALBUMIN SERPL BCP-MCNC: 4.4 G/DL (ref 3.4–5)
ALP SERPL-CCNC: 90 U/L (ref 33–136)
ALT SERPL W P-5'-P-CCNC: 9 U/L (ref 7–45)
ANION GAP SERPL CALC-SCNC: 14 MMOL/L (ref 10–20)
AST SERPL W P-5'-P-CCNC: 15 U/L (ref 9–39)
BASOPHILS # BLD AUTO: 0.04 X10*3/UL (ref 0–0.1)
BASOPHILS NFR BLD AUTO: 0.7 %
BILIRUB SERPL-MCNC: 0.8 MG/DL (ref 0–1.2)
BUN SERPL-MCNC: 26 MG/DL (ref 6–23)
CALCIUM SERPL-MCNC: 10 MG/DL (ref 8.6–10.3)
CHLORIDE SERPL-SCNC: 100 MMOL/L (ref 98–107)
CO2 SERPL-SCNC: 30 MMOL/L (ref 21–32)
CREAT SERPL-MCNC: 1.09 MG/DL (ref 0.5–1.05)
EGFRCR SERPLBLD CKD-EPI 2021: 56 ML/MIN/1.73M*2
EOSINOPHIL # BLD AUTO: 0.04 X10*3/UL (ref 0–0.7)
EOSINOPHIL NFR BLD AUTO: 0.7 %
ERYTHROCYTE [DISTWIDTH] IN BLOOD BY AUTOMATED COUNT: 13.1 % (ref 11.5–14.5)
GLUCOSE SERPL-MCNC: 104 MG/DL (ref 74–99)
HCT VFR BLD AUTO: 47.1 % (ref 36–46)
HGB BLD-MCNC: 15.9 G/DL (ref 12–16)
IMM GRANULOCYTES # BLD AUTO: 0.01 X10*3/UL (ref 0–0.7)
IMM GRANULOCYTES NFR BLD AUTO: 0.2 % (ref 0–0.9)
LYMPHOCYTES # BLD AUTO: 2.12 X10*3/UL (ref 1.2–4.8)
LYMPHOCYTES NFR BLD AUTO: 34.7 %
MCH RBC QN AUTO: 30.1 PG (ref 26–34)
MCHC RBC AUTO-ENTMCNC: 33.8 G/DL (ref 32–36)
MCV RBC AUTO: 89 FL (ref 80–100)
MONOCYTES # BLD AUTO: 0.48 X10*3/UL (ref 0.1–1)
MONOCYTES NFR BLD AUTO: 7.9 %
NEUTROPHILS # BLD AUTO: 3.42 X10*3/UL (ref 1.2–7.7)
NEUTROPHILS NFR BLD AUTO: 55.8 %
NRBC BLD-RTO: 0 /100 WBCS (ref 0–0)
PLATELET # BLD AUTO: 367 X10*3/UL (ref 150–450)
POTASSIUM SERPL-SCNC: 3.5 MMOL/L (ref 3.5–5.3)
PROT SERPL-MCNC: 6.8 G/DL (ref 6.4–8.2)
RBC # BLD AUTO: 5.29 X10*6/UL (ref 4–5.2)
SODIUM SERPL-SCNC: 140 MMOL/L (ref 136–145)
TSH SERPL-ACNC: 1.06 MIU/L (ref 0.44–3.98)
WBC # BLD AUTO: 6.1 X10*3/UL (ref 4.4–11.3)

## 2024-02-22 PROCEDURE — 1159F MED LIST DOCD IN RCRD: CPT | Performed by: INTERNAL MEDICINE

## 2024-02-22 PROCEDURE — 80053 COMPREHEN METABOLIC PANEL: CPT

## 2024-02-22 PROCEDURE — 1160F RVW MEDS BY RX/DR IN RCRD: CPT | Performed by: INTERNAL MEDICINE

## 2024-02-22 PROCEDURE — 99214 OFFICE O/P EST MOD 30 MIN: CPT | Performed by: INTERNAL MEDICINE

## 2024-02-22 PROCEDURE — 3079F DIAST BP 80-89 MM HG: CPT | Performed by: INTERNAL MEDICINE

## 2024-02-22 PROCEDURE — 84443 ASSAY THYROID STIM HORMONE: CPT

## 2024-02-22 PROCEDURE — 3074F SYST BP LT 130 MM HG: CPT | Performed by: INTERNAL MEDICINE

## 2024-02-22 PROCEDURE — 1036F TOBACCO NON-USER: CPT | Performed by: INTERNAL MEDICINE

## 2024-02-22 PROCEDURE — 1126F AMNT PAIN NOTED NONE PRSNT: CPT | Performed by: INTERNAL MEDICINE

## 2024-02-22 PROCEDURE — 85025 COMPLETE CBC W/AUTO DIFF WBC: CPT

## 2024-02-22 PROCEDURE — 36415 COLL VENOUS BLD VENIPUNCTURE: CPT

## 2024-02-22 ASSESSMENT — ENCOUNTER SYMPTOMS
SLEEP DISTURBANCE: 1
ABDOMINAL PAIN: 0
WEAKNESS: 1
OCCASIONAL FEELINGS OF UNSTEADINESS: 0
CONSTIPATION: 0
FEVER: 1
DIARRHEA: 1
LOSS OF SENSATION IN FEET: 0
DEPRESSION: 1

## 2024-02-22 ASSESSMENT — PATIENT HEALTH QUESTIONNAIRE - PHQ9
10. IF YOU CHECKED OFF ANY PROBLEMS, HOW DIFFICULT HAVE THESE PROBLEMS MADE IT FOR YOU TO DO YOUR WORK, TAKE CARE OF THINGS AT HOME, OR GET ALONG WITH OTHER PEOPLE: NOT DIFFICULT AT ALL
2. FEELING DOWN, DEPRESSED OR HOPELESS: SEVERAL DAYS
1. LITTLE INTEREST OR PLEASURE IN DOING THINGS: SEVERAL DAYS
SUM OF ALL RESPONSES TO PHQ9 QUESTIONS 1 AND 2: 2

## 2024-02-22 NOTE — PROGRESS NOTES
Patient states she weaker ,  patient states she been having diarrhea due to medication intake, and fatigue with lack of sleep no nausea no fever patient states being unstable and shaky.    Subjective   Patient ID: eGri Christianson is a 63 y.o. female who presents for Sick Visit.  She is accompanied by her  who offers some of the history.     The patient notes significant weakness and has been having trouble getting up from the toilet unassisted since 2/19/2024.  She has been experiencing two episodes of diarrhea since starting Augmentin on 2/19/2024 as well.  On standing, she has noticed dizziness and lower extremity shakiness due to weakness. Her appetite is reduced and has been eating one full meal and a partial breakfast.  The patient has not been drinking sufficient water, and her  suspects dehydration as well.  She denies any abdominal pain.    The patient has not been sleeping well lately, due to concern about missing work.  She has been avoiding the alprazolam 0.25mg prn she has on hand.    The patient reports that the acute sinusitis symptoms are improved since beginning treatment. She notes residual sinus drainage but denies any chest congestion or cough.    The patient is grieving the recent loss of her cousins and her other family members.  She is adjusting as well as can be expected given the circumstances.  She has been under a significant amount of stress over the past few months.        Review of Systems   Constitutional:  Positive for fever.   Gastrointestinal:  Positive for diarrhea. Negative for abdominal pain and constipation.   Neurological:  Positive for weakness.   Psychiatric/Behavioral:  Positive for sleep disturbance.         Positive for grief reaction.       Objective   Physical Exam  Constitutional:       Appearance: Normal appearance.   Neck:      Vascular: No carotid bruit.   Cardiovascular:      Rate and Rhythm: Normal rate and regular rhythm.      Heart sounds:  Normal heart sounds.   Pulmonary:      Effort: Pulmonary effort is normal.      Breath sounds: Normal breath sounds.   Abdominal:      General: Bowel sounds are normal.      Palpations: Abdomen is soft.      Tenderness: There is no abdominal tenderness.   Skin:     General: Skin is warm and dry.   Neurological:      General: No focal deficit present.      Mental Status: She is alert and oriented to person, place, and time. Mental status is at baseline.   Psychiatric:         Mood and Affect: Mood normal.         Behavior: Behavior normal.       Assessment/Plan   Problem List Items Addressed This Visit    None  Visit Diagnoses         Codes    Weakness    -  Primary R53.1    Relevant Orders    Comprehensive metabolic panel    CBC and Auto Differential    Tsh With Reflex To Free T4 If Abnormal            IMPRESSIONS/PLAN:       Diarrhea/Weakness  - Ordered CBC with differential and CMP.  Advised patient to drink Gatorade and water in equal parts to remain well hydrated.  Can also try Pedialyte.  Rest as much as possible.  Call the clinic if symptoms persist or worsen.  Discussed ER- she does not feel this is necessary at this time.      Sleep Disturbance  - Encouraged patient to use previously prescribed alprazolam 0.25mg up to TID prn at night time to help.  Call the clinic if smptoms persist or worse.  Worsening stress levels- support provided.    HTN   - Home readings in the 160s/90s mmHg per patient. Prescribed amlodipine 2.5mg once daily, and instructed patient to confirm with Neurosurgery this is okay before starting medication and she verbalized agreement.  Also advised to ask about goal blood pressure given history of SAH.  Continue on triamterene-HCTZ 37.5-25mg QD.  Call the clinic with any concerns.      HLD   - Stable per 9/2023.  Continue on simvastatin 40mg QD.  ASCVD 5.8% 12/2023.  CT Cardiac Score 202.43 per 8/2023.     Depression  - Previously ordered referral for Adult Psychology with Laura Conrad  [facility].  Call the clinic if symptoms persist or worsen.  Support provided.       Thyroid Nodule   - US thyroid 1/2021 showed stable, subcentimeter nodules of left gland. Stable per 11/2022 repeat.     Mediastinal Cyst  - CT Chest 11/2023 showed cystic lesion in the anterior mediastinum which is unchanged when  compared to the previous cardiac score CT of 08/11/2023. Repeat 11/2024.      Right Hemisphere SAH  - Occurred 9/15/2023, treated with DSA with pipeline shield flow diverter stent embolization.  Continue with clopidogrel 75mg once daily, and ASA 81mg once daily.  Following with  from Neurology at [facility], and  from Endovascular Neurosurgery.       Mammogram   - 11/25/2022 screening test found probably benign calcifications, and radiology recommended repeat in 6 months.  Benign per 6/2023 repeat.  Due in 11/2023 for bilateral screening.     Shingles   - Can continue to take Gabapentin 100 mg as two tablets at bedtime as needed.     Seasonal Allergies  - Advised the patient to try Xyzal as directed on the packaging over the counter.  Call the clinic if symptoms persist or worsen.     Health Maintenance   - Routine labs 9/2023.  Last Mammogram 6/2023.  Last colonoscopy performed 6/2021, due for repeat 2031.  Patient received high-dose Influenza vaccine in the clinic today, tolerated well.      Follow-up according to regular health maintenance, call sooner if needed.        Scribe Attestation  By signing my name below, ISadaf, Leonardae   attest that this documentation has been prepared under the direction and in the presence of Davon Salcedo DO.   Sadaf Barahona 02/22/24 10:16 AM

## 2024-02-29 ENCOUNTER — OFFICE VISIT (OUTPATIENT)
Dept: ORTHOPEDIC SURGERY | Facility: CLINIC | Age: 64
End: 2024-02-29
Payer: MEDICARE

## 2024-02-29 DIAGNOSIS — M17.11 PRIMARY OSTEOARTHRITIS OF RIGHT KNEE: Primary | ICD-10-CM

## 2024-02-29 PROCEDURE — 1159F MED LIST DOCD IN RCRD: CPT | Performed by: INTERNAL MEDICINE

## 2024-02-29 PROCEDURE — 99024 POSTOP FOLLOW-UP VISIT: CPT | Performed by: INTERNAL MEDICINE

## 2024-02-29 PROCEDURE — 1126F AMNT PAIN NOTED NONE PRSNT: CPT | Performed by: INTERNAL MEDICINE

## 2024-02-29 PROCEDURE — 1160F RVW MEDS BY RX/DR IN RCRD: CPT | Performed by: INTERNAL MEDICINE

## 2024-02-29 PROCEDURE — 1036F TOBACCO NON-USER: CPT | Performed by: INTERNAL MEDICINE

## 2024-02-29 PROCEDURE — 20610 DRAIN/INJ JOINT/BURSA W/O US: CPT | Performed by: INTERNAL MEDICINE

## 2024-02-29 NOTE — PROGRESS NOTES
CC:   No chief complaint on file.      HPI: Geri is a 63 y.o. female presents today for her third and final Synvisc injection of the right knee.  No new issues.  She is already feeling relief.        Review of Systems   GENERAL: Negative for malaise, significant weight loss, fever  MUSCULOSKELETAL: See HPI  NEURO:  Negative for numbness / tingling     Past Medical History  Past Medical History:   Diagnosis Date    Conjunctival hemorrhage, left eye 06/19/2015    Subconjunctival hemorrhage of left eye    Other conditions influencing health status 03/31/2017    Mammogram normal    Personal history of other diseases of the nervous system and sense organs 11/28/2012    History of migraine headaches    Personal history of other infectious and parasitic diseases     History of intestinal infection       Medication review  Medication Documentation Review Audit       Reviewed by Alexandra Tobias MA (Medical Assistant) on 02/22/24 at 1017      Medication Order Taking? Sig Documenting Provider Last Dose Status   ALPRAZolam (Xanax) 0.25 mg tablet [id number] Yes Take 1 tablet (0.25 mg) by mouth every 6 hours during the day. Historical Provider, MD Taking Active   amLODIPine (Norvasc) 2.5 mg tablet [id number] Yes Take 1 tablet (2.5 mg) by mouth once daily. Davon Salcedo, DO Taking Active   amoxicillin-pot clavulanate (Augmentin) 875-125 mg tablet [id number] Yes Take 1 tablet (875 mg) by mouth 2 times a day for 10 days. Davon Salcedo, DO Taking Active   aspirin 81 mg chewable tablet [id number] Yes Chew 1 tablet (81 mg) once daily. Historical Provider, MD Taking Active   betamethasone acet,sod phos (Celestone) injection 18 mg [id number]   Chance Maldonado MD  Active   clopidogrel (Plavix) 75 mg tablet [id number] No Take 1 tablet (75 mg) by mouth once daily. Historical Provider, MD Not Taking Active   diazePAM (Valium) 5 mg tablet [id number]  Take 0.5 tablets (2.5 mg) by mouth 1 time if needed for anxiety (take 0.5 tablet 1 hour  prior to procedure and repeat before procedure PRN) for up to 1 dose. Chance Maldonado MD   [id number]   gabapentin (Neurontin) 100 mg capsule [id number] Yes Take 2 capsules (200 mg) by mouth once daily at bedtime. Davon Salcedo, DO Taking Active   lidocaine (Xylocaine) 10 mg/mL (1 %) injection 3 mL [id number]   Chance Maldonado MD  Active   simvastatin (Zocor) 40 mg tablet [id number] Yes take 1 tablet by mouth in the evening. Davon Salcedo, DO Taking Active   triamterene-hydrochlorothiazid (Dyazide) 37.5-25 mg capsule [id number] Yes Take 1 capsule by mouth once daily. Davon Salcedo, DO Taking Active                    Allergies  Allergies   Allergen Reactions    Adhesive Tape-Silicones Unknown    Epinephrine Unknown    Lidocaine Unknown    Losartan-Hydrochlorothiazide Unknown       Social History  Social History     Socioeconomic History    Marital status:      Spouse name: Not on file    Number of children: Not on file    Years of education: Not on file    Highest education level: Not on file   Occupational History     Employer: [employer]   Tobacco Use    Smoking status: Never    Smokeless tobacco: Never   Vaping Use    Vaping Use: Never used   Substance and Sexual Activity    Alcohol use: Yes    Drug use: Never    Sexual activity: Not on file   Other Topics Concern    Not on file   Social History Narrative    Not on file     Social Determinants of Health     Financial Resource Strain: Not on file   Food Insecurity: Not on file   Transportation Needs: Not on file   Physical Activity: Not on file   Stress: Not on file   Social Connections: Not on file   Intimate Partner Violence: Not on file   Housing Stability: Not on file       Surgical History  Past Surgical History:   Procedure Laterality Date    COLONOSCOPY  2012    Complete Colonoscopy    COLONOSCOPY  2012    Complete Colonoscopy    CT HEAD ANGIO W AND WO IV CONTRAST  2023    CT HEAD ANGIO W AND WO IV CONTRAST  12/13/2023    OTHER SURGICAL HISTORY  11/28/2012    Uterine Myomectomy    OTHER SURGICAL HISTORY  11/28/2012    Cervical Conization By Laser    REFRACTIVE SURGERY  10/11/2013    Corneal LASIK    TONSILLECTOMY  11/28/2012    Tonsillectomy       Physical Exam:  GENERAL:  Patient is awake, alert, and oriented to person place and time.  Patient appears well nourished and well kept.  Affect Calm, Not Acutely Distressed.  HEENT:  Normocephalic, Atraumatic, EOMI  CARDIOVASCULAR:  Hemodynamically stable.  RESPIRATORY:  Normal respirations with unlabored breathing.  Extremity: Right knee shows skin is intact.  There is no erythema or warmth.  There is no clinical sign of infection.      Diagnostics: None today  MR knee right wo IV contrast  Narrative: Interpreted By:  Sebastian Covarrubias,   STUDY:  MR KNEE RIGHT WO IV CONTRAST;  1/18/2024 9:18 am      INDICATION:  Signs/Symptoms:pain.      COMPARISON:  None.      ACCESSION NUMBER(S):  [id number]      ORDERING CLINICIAN:  THIAGO FARMER      TECHNIQUE:  Multiplanar and multisequential MR images of the right knee are  performed.      FINDINGS:  There is a small joint effusion. There is a small to moderate-sized  Baker's cyst with thin internal septations. There is ill-defined  fluid and edema extending superiorly surrounding the distal  semimembranosus muscle belly and myotendinous junction which could  reflect a component of cyst rupture. There is a fat intensity  intramuscular mass in the distal semimembranosus which measures at  least 4 cm in craniocaudal diameter and 2.5 x 2.2 cm in  cross-section. There is no significant internal nodularity or edema.  There is no internal fluid signal or calcification.      There are mild changes of tricompartmental osteoarthritis with  diffuse irregular thinning of the articular cartilage and joint space  narrowing, more pronounced in the medial compartment and  patellofemoral joint medially. There is minor subcortical  degenerative  signal and cystic changes at the lateral patellar facet  and medial femoral trochlea. There is no loose osteochondral lesion.  There is no sign of acute osseous fracture or osseous mass.      The lateral meniscus is of normal morphology. There is faint  horizontal linear signal within the posterior horn and body without  clear violation of the articular surface. On a single sagittal image  there is minimal increased signal and truncation at the free edge of  the posterior horn at its junction with the body of the meniscus. A  tiny radial tear can not be excluded. The remainder of the meniscus  is unremarkable.      The medial meniscus demonstrates some amorphous intermediate signal  centrally in the posterior horn. There is no linear tear or  truncation.      The anterior and posterior cruciate ligaments, lateral collateral  ligament complex, medial collateral ligament, extensor complex, and  patellar retinacula are intact. There is some fluid surrounding the  popliteus myotendinous junction. The tendon is intact.      Impression: Tricompartmental osteoarthritis.      Equivocal tiny radial tear along the free edge of the lateral  meniscus at the junction of the posterior horn and body.      No sign of acute ligament disruption.      Moderate size septated Baker's cyst. There is ill-defined fluid and  edema surrounding the distal semimembranosus muscle belly which could  reflect a component of rupture.      Incidental 4.0 x 2.5 x 2.2 cm intramuscular lipoma in the distal  semimembranosus.      No acute osseous abnormality.      Signed by: Sebastian Covarrubias 1/18/2024 9:39 AM  Dictation workstation:   [id number]        Procedure: L Inj/Asp: R knee on 2/29/2024 4:04 PM  Indications: pain  Details: 22 G needle, lateral approach  Medications: 2 mL hylan 16 mg/2 mL  Outcome: tolerated well, no immediate complications  Procedure, treatment alternatives, risks and benefits explained, specific risks discussed. Consent was  given by the patient. Immediately prior to procedure a time out was called to verify the correct patient, procedure, equipment, support staff and site/side marked as required. Patient was prepped and draped in the usual sterile fashion.             Assessment:  1.  Right knee osteoarthritis  2.  Right knee lateral muscle tear    Plan: Geri presents today for third and final Synvisc injection of the right knee, she tolerated all 3 injections well.  She is already feeling relief.  We discussed we could repeat injection anytime after  6 months.  If she gets no relief she may be candidate arthroscopic meniscectomy, which may worsen her underlying knee arthritis.    No orders of the defined types were placed in this encounter.     At the conclusion of the visit there were no further questions by the patient/family regarding their plan of care.  Patient was instructed to call or return with any issues, questions, or concerns regarding their injury and/or treatment plan described above.     02/29/24 at 2:15 PM - Chance Maldonado MD    Office: [phone]    This note was prepared using voice recognition software.  The details of this note are correct and have been reviewed, and corrected to the best of my ability.  Some grammatical errors may persist related to the Dragon software.

## 2024-03-19 ENCOUNTER — HOSPITAL ENCOUNTER (OUTPATIENT)
Dept: INTERVENTIONAL RADIOLOGY/VASCULAR | Age: 64
Discharge: HOME | End: 2024-03-21
Payer: MEDICARE

## 2024-03-19 VITALS
SYSTOLIC BLOOD PRESSURE: 138 MMHG | OXYGEN SATURATION: 99 % | RESPIRATION RATE: 20 BRPM | HEART RATE: 67 BPM | DIASTOLIC BLOOD PRESSURE: 72 MMHG

## 2024-03-19 DIAGNOSIS — I67.1 CEREBRAL ANEURYSM: ICD-10-CM

## 2024-03-19 LAB
BUN BLD-MCNC: 27 MG/DL (ref 8–26)
CA-I BLD-SCNC: 1.17 MMOL/L (ref 1.15–1.33)
CHLORIDE BLD-SCNC: 104 MMOL/L (ref 98–107)
EGFR, POC: >60 ML/MIN/1.73M2
GLUCOSE BLD-MCNC: 96 MG/DL (ref 74–100)
HCT VFR BLD AUTO: 45 % (ref 36–46)
POC CREATININE: 0.7 MG/DL (ref 0.51–1.19)
POC HEMOGLOBIN (CALC): 15.5 G/DL (ref 12–16)
POTASSIUM BLD-SCNC: 4.7 MMOL/L (ref 3.5–4.5)
SODIUM BLD-SCNC: 142 MMOL/L (ref 138–146)

## 2024-03-19 PROCEDURE — 7100000010 HC PHASE II RECOVERY - FIRST 15 MIN: Performed by: PSYCHIATRY & NEUROLOGY

## 2024-03-19 PROCEDURE — 6360000002 HC RX W HCPCS: Performed by: PSYCHIATRY & NEUROLOGY

## 2024-03-19 PROCEDURE — 6370000000 HC RX 637 (ALT 250 FOR IP): Performed by: PSYCHIATRY & NEUROLOGY

## 2024-03-19 PROCEDURE — 82947 ASSAY GLUCOSE BLOOD QUANT: CPT

## 2024-03-19 PROCEDURE — 36224 PLACE CATH CAROTD ART: CPT

## 2024-03-19 PROCEDURE — 99214 OFFICE O/P EST MOD 30 MIN: CPT | Performed by: PSYCHIATRY & NEUROLOGY

## 2024-03-19 PROCEDURE — 82565 ASSAY OF CREATININE: CPT

## 2024-03-19 PROCEDURE — 82435 ASSAY OF BLOOD CHLORIDE: CPT

## 2024-03-19 PROCEDURE — 85014 HEMATOCRIT: CPT

## 2024-03-19 PROCEDURE — C1887 CATHETER, GUIDING: HCPCS

## 2024-03-19 PROCEDURE — 2580000003 HC RX 258: Performed by: PSYCHIATRY & NEUROLOGY

## 2024-03-19 PROCEDURE — 99153 MOD SED SAME PHYS/QHP EA: CPT

## 2024-03-19 PROCEDURE — 99152 MOD SED SAME PHYS/QHP 5/>YRS: CPT

## 2024-03-19 PROCEDURE — 36226 PLACE CATH VERTEBRAL ART: CPT

## 2024-03-19 PROCEDURE — 84520 ASSAY OF UREA NITROGEN: CPT

## 2024-03-19 PROCEDURE — 6360000004 HC RX CONTRAST MEDICATION: Performed by: PSYCHIATRY & NEUROLOGY

## 2024-03-19 PROCEDURE — 76377 3D RENDER W/INTRP POSTPROCES: CPT

## 2024-03-19 PROCEDURE — 82330 ASSAY OF CALCIUM: CPT

## 2024-03-19 PROCEDURE — 7100000011 HC PHASE II RECOVERY - ADDTL 15 MIN: Performed by: PSYCHIATRY & NEUROLOGY

## 2024-03-19 PROCEDURE — 84295 ASSAY OF SERUM SODIUM: CPT

## 2024-03-19 PROCEDURE — 84132 ASSAY OF SERUM POTASSIUM: CPT

## 2024-03-19 RX ORDER — ONDANSETRON 4 MG/1
4 TABLET, ORALLY DISINTEGRATING ORAL EVERY 8 HOURS PRN
Status: DISCONTINUED | OUTPATIENT
Start: 2024-03-19 | End: 2024-03-22 | Stop reason: HOSPADM

## 2024-03-19 RX ORDER — SODIUM CHLORIDE 0.9 % (FLUSH) 0.9 %
5-40 SYRINGE (ML) INJECTION PRN
Status: DISCONTINUED | OUTPATIENT
Start: 2024-03-19 | End: 2024-03-22 | Stop reason: HOSPADM

## 2024-03-19 RX ORDER — ONDANSETRON 2 MG/ML
4 INJECTION INTRAMUSCULAR; INTRAVENOUS EVERY 6 HOURS PRN
Status: DISCONTINUED | OUTPATIENT
Start: 2024-03-19 | End: 2024-03-22 | Stop reason: HOSPADM

## 2024-03-19 RX ORDER — FENTANYL CITRATE 50 UG/ML
INJECTION, SOLUTION INTRAMUSCULAR; INTRAVENOUS PRN
Status: COMPLETED | OUTPATIENT
Start: 2024-03-19 | End: 2024-03-19

## 2024-03-19 RX ORDER — ACETAMINOPHEN 325 MG/1
650 TABLET ORAL EVERY 4 HOURS PRN
Status: DISCONTINUED | OUTPATIENT
Start: 2024-03-19 | End: 2024-03-22 | Stop reason: HOSPADM

## 2024-03-19 RX ORDER — SODIUM CHLORIDE 9 MG/ML
INJECTION, SOLUTION INTRAVENOUS PRN
Status: DISCONTINUED | OUTPATIENT
Start: 2024-03-19 | End: 2024-03-22 | Stop reason: HOSPADM

## 2024-03-19 RX ORDER — SODIUM CHLORIDE 0.9 % (FLUSH) 0.9 %
5-40 SYRINGE (ML) INJECTION EVERY 12 HOURS SCHEDULED
Status: DISCONTINUED | OUTPATIENT
Start: 2024-03-19 | End: 2024-03-22 | Stop reason: HOSPADM

## 2024-03-19 RX ORDER — MAGNESIUM GLUCONATE 27 MG(500)
100 TABLET ORAL DAILY
COMMUNITY

## 2024-03-19 RX ORDER — HEPARIN SODIUM 1000 [USP'U]/ML
INJECTION, SOLUTION INTRAVENOUS; SUBCUTANEOUS PRN
Status: COMPLETED | OUTPATIENT
Start: 2024-03-19 | End: 2024-03-19

## 2024-03-19 RX ORDER — MAGNESIUM GLUCONATE 27 MG(500)
200 TABLET ORAL NIGHTLY
COMMUNITY

## 2024-03-19 RX ORDER — MIDAZOLAM HYDROCHLORIDE 2 MG/2ML
INJECTION, SOLUTION INTRAMUSCULAR; INTRAVENOUS PRN
Status: COMPLETED | OUTPATIENT
Start: 2024-03-19 | End: 2024-03-19

## 2024-03-19 RX ORDER — SODIUM CHLORIDE 9 MG/ML
INJECTION, SOLUTION INTRAVENOUS CONTINUOUS
Status: DISCONTINUED | OUTPATIENT
Start: 2024-03-19 | End: 2024-03-22 | Stop reason: HOSPADM

## 2024-03-19 RX ORDER — IODIXANOL 270 MG/ML
81 INJECTION, SOLUTION INTRAVASCULAR
Status: COMPLETED | OUTPATIENT
Start: 2024-03-19 | End: 2024-03-19

## 2024-03-19 RX ADMIN — MIDAZOLAM HYDROCHLORIDE 1 MG: 1 INJECTION, SOLUTION INTRAMUSCULAR; INTRAVENOUS at 09:10

## 2024-03-19 RX ADMIN — IODIXANOL 81 ML: 270 INJECTION, SOLUTION INTRAVASCULAR at 10:23

## 2024-03-19 RX ADMIN — SODIUM CHLORIDE: 9 INJECTION, SOLUTION INTRAVENOUS at 08:02

## 2024-03-19 RX ADMIN — MIDAZOLAM HYDROCHLORIDE 0.5 MG: 1 INJECTION, SOLUTION INTRAMUSCULAR; INTRAVENOUS at 09:21

## 2024-03-19 RX ADMIN — HEPARIN SODIUM 2000 UNITS: 1000 INJECTION, SOLUTION INTRAVENOUS; SUBCUTANEOUS at 09:23

## 2024-03-19 RX ADMIN — FENTANYL CITRATE 50 MCG: 50 INJECTION, SOLUTION INTRAMUSCULAR; INTRAVENOUS at 09:21

## 2024-03-19 RX ADMIN — CEFAZOLIN 2000 MG: 1 INJECTION, POWDER, FOR SOLUTION INTRAMUSCULAR; INTRAVENOUS at 08:51

## 2024-03-19 RX ADMIN — ACETAMINOPHEN 650 MG: 325 TABLET ORAL at 13:33

## 2024-03-19 ASSESSMENT — PAIN DESCRIPTION - LOCATION: LOCATION: HEAD

## 2024-03-19 ASSESSMENT — PAIN SCALES - GENERAL: PAINLEVEL_OUTOF10: 4

## 2024-03-19 NOTE — PROGRESS NOTES
Ambulated to bathroom and in halls.  Gait steady.. Right groin puncture site and right pedal pulse assessment unchanged.    All discharge instructions reviewed, questions answered, paper signed and given copy. Patient discharged per WHEELCHAIR with  and belongings.

## 2024-03-19 NOTE — BRIEF OP NOTE
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  Neurological exam performed and unchanged from initial H&P or consult    Anesthesia: IV Moderate Sedation  Complications: none    Intra-Operative EXAM:  Patient sedated with unchanged limited neurological exam    EBL: < Minimal      Cc            Specimens: Were not Obtained  Contrast:     Visipaque 270 low osmolar 81 Cc             Fluoro: 22.2 min    Findings:  Please see dictated Radiology note for further details  Previously treated R ICA fusiform blister aneurysm with pipeline shield flow diverter is completely obliterated. The FD is well apposed with minimal intimal hyperplasia.   Mild luminal irregularity of unknown clinical significance in the R communicating segment of the L ICA.           Left ICA              POST-PROCEDURAL EXAM :   Stable neurological Exam  Neurological exam performed and unchanged from initial H&P or consult    Closure:  right Vascade 5   F        POST-PROCEDURAL MONITORING : see orders  Disposition: Recovery room      Recommendations:  Back to Recovery room  Do not bend right leg for 3 hours.  Groin checks per protocol.  Peripheral pulse checks per protocol.  SBP goal 100-140  Follow up with Dr. Foster 3 months after discharge.        MD Prince Madsen MD   Pager [phone]  Stroke, Neurocritical Care &

## 2024-03-19 NOTE — H&P
6Fr Cook Shuttle, Navien 058, Phenom 27, Synchro Support, Pipeline shield 3.53b08so, flow diverter stent is well apposed to the wall, with early contrast stagnation into the aneurysm, achieving Mahamed 2.  Aspirin 325mg and plavix 600mg and heparin 2000 units were loaded after the Pipeline Shield was successfully deployed.      Mahamed score: class II     Pre-treatment: Rigth ICA terminus-communicating segment ruptured fusiform aneurysm                  Post treatment: contrast stagnation seen in the aneurysm, Mahamed 2, with flow diverter stent well apposed to the wall            ASSESSMENT:        Charity Galvez is a 63 y.o. 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 Monitor for epistaxs      PLAN:       - con't aspirin 81mg daily  - Will proceed with DSA under IV moderate sedation     -Risks and benefits discussed, risks include but are not limited to bruising, stroke, subarachnoid hemorrhage, death, retroperitoneal hematoma, pseudoaneurysm, lower extremity/renal/peripheral vascular compromise, informed consent obtained.      Ashleigh Bee MD    Stroke, Neurocritical Care & Neurointervention  [facility]  [facility] [place]  Electronically signed 10/13/2023 at 12:13 PM

## 2024-03-19 NOTE — DISCHARGE INSTRUCTIONS
Discharge Instructions for angiogram  Home Care     Ok to shower in AM. Discontinue band aid in AM.   Do not apply further band aids. Keep clean, dry and open to air. No powder or lotion.  Do not soak in a pool or tub and do not swim for one week.   If there is any bleeding at the catheter site, apply firm pressure with your hands until the bleeding stops. If bleeding continues after 3 minutes call 911.   If there is any swelling or firm areas at your puncture site, this could be bleeding under the skin(hematoma), and if you have any concerns seek help immediately.         .     If there is any bleeding at the catheter site, apply firm pressure with your hands until the bleeding stops. If bleeding continues after 3 minutes call 911.      Drink plenty of fluids after the test. This will flush the x-ray dye from your system.   Return to your normal diet.       The sedative will make you sleepy. Rest until the effects have worn off.   Ask your doctor when you will be able to return to work.   Avoid heavy lifting objects greater than 10  pounds, physically demanding activities, and sexual activity for 5 days.   Your activity will also depend upon where the catheter was inserted: Do not sit for long periods of time. Try to change positions frequently.   Medications   If advised by your doctor, resume taking your normal medicines. Use acetaminophen (Tylenol) for pain relief.   Do not take metformin (Glucophage) or glyburide and metformin (Glucovance) for 48 hours after the test.    If you had to stop taking these medications before the procedure, ask your doctor when you can resume taking them:   If youAnti-inflammatory drugs (eg, ibuprofen )   Blood thinners, such as warfarin (Coumadin)   Clopidogrel (Plavix)   If you are taking medicines, follow these general guidelines:   Take your medicine as directed. Do not change the amount or the schedule.   Do not stop taking them without talking to your doctor.   Do not share

## 2024-03-19 NOTE — PROGRESS NOTES
Patient admitted, consent signed and questions answered. Patient ready for procedure. Call light to reach with side rails up 2 of 2.  Bassam at bedside with patient.  History and physical needs updated.

## 2024-03-19 NOTE — PROGRESS NOTES
Hemodialysis ordered by Dr. Slater. Treatment started at 1154 and ended at 1454. Transfused 3 units PRBC with this HD. Pt stable, vss, no c/o post tx. See flow sheets for details. Net UF 1.7 L. Reported to NEHA Suh RN. ROCHELLE Simon verified rt ua avf dressing clean, dry, intact.    Patient returned to room . Patient assessment unchanged from before. Patient awake and  alert, denies any complaints.    Right groin remains without  hematoma or drainage.   Patient resting supine in bed. Side rails up times 2, call light with  reach. Family at bedside. Patient closely monitored.

## 2024-04-08 ENCOUNTER — TELEPHONE (OUTPATIENT)
Dept: NEUROLOGY | Age: 64
End: 2024-04-08

## 2024-04-08 NOTE — TELEPHONE ENCOUNTER
Received call from pt stating that she was doing PT for her knee before her angiogram and the PT facility is stating they need a release so she can go back. States she was doing PT at [facility] in [place], [place] [phone]. Need approval to type letter up to send to PT. Please advise

## 2024-04-09 ENCOUNTER — OFFICE VISIT (OUTPATIENT)
Dept: ORTHOPEDIC SURGERY | Facility: CLINIC | Age: 64
End: 2024-04-09
Payer: MEDICARE

## 2024-04-09 ENCOUNTER — HOSPITAL ENCOUNTER (OUTPATIENT)
Dept: RADIOLOGY | Facility: EXTERNAL LOCATION | Age: 64
Discharge: HOME | End: 2024-04-09

## 2024-04-09 DIAGNOSIS — M17.11 PRIMARY OSTEOARTHRITIS OF RIGHT KNEE: Primary | ICD-10-CM

## 2024-04-09 PROCEDURE — 1160F RVW MEDS BY RX/DR IN RCRD: CPT | Performed by: INTERNAL MEDICINE

## 2024-04-09 PROCEDURE — 2500000004 HC RX 250 GENERAL PHARMACY W/ HCPCS (ALT 636 FOR OP/ED): Performed by: INTERNAL MEDICINE

## 2024-04-09 PROCEDURE — 1159F MED LIST DOCD IN RCRD: CPT | Performed by: INTERNAL MEDICINE

## 2024-04-09 PROCEDURE — 1036F TOBACCO NON-USER: CPT | Performed by: INTERNAL MEDICINE

## 2024-04-09 PROCEDURE — 99213 OFFICE O/P EST LOW 20 MIN: CPT | Performed by: INTERNAL MEDICINE

## 2024-04-09 PROCEDURE — 2500000005 HC RX 250 GENERAL PHARMACY W/O HCPCS: Performed by: INTERNAL MEDICINE

## 2024-04-09 PROCEDURE — 20611 DRAIN/INJ JOINT/BURSA W/US: CPT | Mod: RT | Performed by: INTERNAL MEDICINE

## 2024-04-09 RX ORDER — BETAMETHASONE SODIUM PHOSPHATE AND BETAMETHASONE ACETATE 3; 3 MG/ML; MG/ML
3 INJECTION, SUSPENSION INTRA-ARTICULAR; INTRALESIONAL; INTRAMUSCULAR; SOFT TISSUE
Status: COMPLETED | OUTPATIENT
Start: 2024-04-09 | End: 2024-04-09

## 2024-04-09 RX ORDER — LIDOCAINE HYDROCHLORIDE 10 MG/ML
6 INJECTION INFILTRATION; PERINEURAL
Status: COMPLETED | OUTPATIENT
Start: 2024-04-09 | End: 2024-04-09

## 2024-04-09 RX ADMIN — LIDOCAINE HYDROCHLORIDE 6 ML: 10 INJECTION, SOLUTION INFILTRATION; PERINEURAL at 11:25

## 2024-04-09 RX ADMIN — BETAMETHASONE ACETATE AND BETAMETHASONE SODIUM PHOSPHATE 3 ML: 3; 3 INJECTION, SUSPENSION INTRA-ARTICULAR; INTRALESIONAL; INTRAMUSCULAR; SOFT TISSUE at 11:25

## 2024-04-09 NOTE — PROGRESS NOTES
CC:   No chief complaint on file.      HPI: Geri is a 64 y.o. female presents today for aggravated right knee pain.  She believes she aggravated her knee on a recumbent bike, and is in for trip from [place] this weekend.  She was responding well to the recent gel injections.      Review of Systems   GENERAL: Negative for malaise, significant weight loss, fever  MUSCULOSKELETAL: See HPI  NEURO:  Negative for numbness / tingling     Past Medical History  Past Medical History:   Diagnosis Date    Conjunctival hemorrhage, left eye 06/19/2015    Subconjunctival hemorrhage of left eye    Other conditions influencing health status 03/31/2017    Mammogram normal    Personal history of other diseases of the nervous system and sense organs 11/28/2012    History of migraine headaches    Personal history of other infectious and parasitic diseases     History of intestinal infection       Medication review  Medication Documentation Review Audit       Reviewed by Alexandra Tobias MA (Medical Assistant) on 02/22/24 at 1017      Medication Order Taking? Sig Documenting Provider Last Dose Status   ALPRAZolam (Xanax) 0.25 mg tablet [id number] Yes Take 1 tablet (0.25 mg) by mouth every 6 hours during the day. Historical Provider, MD Taking Active   amLODIPine (Norvasc) 2.5 mg tablet [id number] Yes Take 1 tablet (2.5 mg) by mouth once daily. Davon Salcedo, DO Taking Active   amoxicillin-pot clavulanate (Augmentin) 875-125 mg tablet [id number] Yes Take 1 tablet (875 mg) by mouth 2 times a day for 10 days. Davon Salcedo, DO Taking Active   aspirin 81 mg chewable tablet [id number] Yes Chew 1 tablet (81 mg) once daily. Historical Provider, MD Taking Active   betamethasone acet,sod phos (Celestone) injection 18 mg [id number]   Chance Maldonado MD  Active   clopidogrel (Plavix) 75 mg tablet [id number] No Take 1 tablet (75 mg) by mouth once daily. Historical Provider, MD Not Taking Active   diazePAM (Valium) 5 mg tablet [id number]  Take  0.5 tablets (2.5 mg) by mouth 1 time if needed for anxiety (take 0.5 tablet 1 hour prior to procedure and repeat before procedure PRN) for up to 1 dose. Chance Maldonado MD   24 1755   gabapentin (Neurontin) 100 mg capsule [id number] Yes Take 2 capsules (200 mg) by mouth once daily at bedtime. Davon Salcedo, DO Taking Active   lidocaine (Xylocaine) 10 mg/mL (1 %) injection 3 mL [id number]   Chance Maldonado MD  Active   simvastatin (Zocor) 40 mg tablet [id number] Yes take 1 tablet by mouth in the evening. Davon Salcedo, DO Taking Active   triamterene-hydrochlorothiazid (Dyazide) 37.5-25 mg capsule [id number] Yes Take 1 capsule by mouth once daily. Davon Salcedo, DO Taking Active                    Allergies  Allergies   Allergen Reactions    Adhesive Tape-Silicones Unknown    Epinephrine Unknown    Lidocaine Unknown    Losartan-Hydrochlorothiazide Unknown       Social History  Social History     Socioeconomic History    Marital status:      Spouse name: Not on file    Number of children: Not on file    Years of education: Not on file    Highest education level: Not on file   Occupational History     Employer: [employer]   Tobacco Use    Smoking status: Never    Smokeless tobacco: Never   Vaping Use    Vaping Use: Never used   Substance and Sexual Activity    Alcohol use: Yes    Drug use: Never    Sexual activity: Not on file   Other Topics Concern    Not on file   Social History Narrative    Not on file     Social Determinants of Health     Financial Resource Strain: Not on file   Food Insecurity: Not on file   Transportation Needs: Not on file   Physical Activity: Not on file   Stress: Not on file   Social Connections: Not on file   Intimate Partner Violence: Not on file   Housing Stability: Not on file       Surgical History  Past Surgical History:   Procedure Laterality Date    COLONOSCOPY  2012    Complete Colonoscopy    COLONOSCOPY  2012    Complete Colonoscopy    CT  HEAD ANGIO W AND WO IV CONTRAST  12/13/2023    CT HEAD ANGIO W AND WO IV CONTRAST 12/13/2023    OTHER SURGICAL HISTORY  11/28/2012    Uterine Myomectomy    OTHER SURGICAL HISTORY  11/28/2012    Cervical Conization By Laser    REFRACTIVE SURGERY  10/11/2013    Corneal LASIK    TONSILLECTOMY  11/28/2012    Tonsillectomy       Physical Exam:  GENERAL:  Patient is awake, alert, and oriented to person place and time.  Patient appears well nourished and well kept.  Affect Calm, Not Acutely Distressed.  HEENT:  Normocephalic, Atraumatic, EOMI  CARDIOVASCULAR:  Hemodynamically stable.  RESPIRATORY:  Normal respirations with unlabored breathing.  Extremity: Right knee shows skin is intact. There is no erythema or warmth. There is no clinical sign of infection.  There is a trace to 1+ effusion.  She can flex right knee to 125 degrees with pain.  Full extension at 0 degrees.  Negative valgus and varus stress test.  Pain over the posterior medial joint line.  Mild pain in the popliteal fossa.  Patellar and quadricep mechanism tact.  Negative Lachman's test.  Positive Nikko's test laterally.  Left knee was examined for comparison.      Diagnostics: MRI was reviewed  MR knee right wo IV contrast  Narrative: Interpreted By:  Sebastian Covarrubias,   STUDY:  MR KNEE RIGHT WO IV CONTRAST;  1/18/2024 9:18 am      INDICATION:  Signs/Symptoms:pain.      COMPARISON:  None.      ACCESSION NUMBER(S):  [id number]      ORDERING CLINICIAN:  THIAGO FARMER      TECHNIQUE:  Multiplanar and multisequential MR images of the right knee are  performed.      FINDINGS:  There is a small joint effusion. There is a small to moderate-sized  Baker's cyst with thin internal septations. There is ill-defined  fluid and edema extending superiorly surrounding the distal  semimembranosus muscle belly and myotendinous junction which could  reflect a component of cyst rupture. There is a fat intensity  intramuscular mass in the distal semimembranosus which  measures at  least 4 cm in craniocaudal diameter and 2.5 x 2.2 cm in  cross-section. There is no significant internal nodularity or edema.  There is no internal fluid signal or calcification.      There are mild changes of tricompartmental osteoarthritis with  diffuse irregular thinning of the articular cartilage and joint space  narrowing, more pronounced in the medial compartment and  patellofemoral joint medially. There is minor subcortical  degenerative signal and cystic changes at the lateral patellar facet  and medial femoral trochlea. There is no loose osteochondral lesion.  There is no sign of acute osseous fracture or osseous mass.      The lateral meniscus is of normal morphology. There is faint  horizontal linear signal within the posterior horn and body without  clear violation of the articular surface. On a single sagittal image  there is minimal increased signal and truncation at the free edge of  the posterior horn at its junction with the body of the meniscus. A  tiny radial tear can not be excluded. The remainder of the meniscus  is unremarkable.      The medial meniscus demonstrates some amorphous intermediate signal  centrally in the posterior horn. There is no linear tear or  truncation.      The anterior and posterior cruciate ligaments, lateral collateral  ligament complex, medial collateral ligament, extensor complex, and  patellar retinacula are intact. There is some fluid surrounding the  popliteus myotendinous junction. The tendon is intact.      Impression: Tricompartmental osteoarthritis.      Equivocal tiny radial tear along the free edge of the lateral  meniscus at the junction of the posterior horn and body.      No sign of acute ligament disruption.      Moderate size septated Baker's cyst. There is ill-defined fluid and  edema surrounding the distal semimembranosus muscle belly which could  reflect a component of rupture.      Incidental 4.0 x 2.5 x 2.2 cm intramuscular lipoma in the  distal  semimembranosus.      No acute osseous abnormality.      Signed by: Sebastian Satish 1/18/2024 9:39 AM  Dictation workstation:   [id number]        Procedure: L Inj/Asp: R knee on 4/9/2024 11:25 AM  Indications: pain  Details: 22 G needle, ultrasound-guided lateral approach  Medications: 3 mL betamethasone acet,sod phos 6 mg/mL; 6 mL lidocaine 10 mg/mL (1 %)  Outcome: tolerated well, no immediate complications  Procedure, treatment alternatives, risks and benefits explained, specific risks discussed. Consent was given by the patient. Immediately prior to procedure a time out was called to verify the correct patient, procedure, equipment, support staff and site/side marked as required. Patient was prepped and draped in the usual sterile fashion.             Assessment:   1.  Right knee osteoarthritis  2.  Right knee lateral muscle tear    Plan: Geri  presents today for reevaluation for aggravation of right knee osteoarthritis during physical therapy.  She is not interested in any surgical options time, and is requesting a repeat intra-articular corticosteroid injection.  Previous injection was over 6 months ago.  Risk and benefits of the procedure discussed, she was agreeable for the injection.  If her pain persist she may be candidate arthroscopic meniscectomy.    No orders of the defined types were placed in this encounter.     At the conclusion of the visit there were no further questions by the patient/family regarding their plan of care.  Patient was instructed to call or return with any issues, questions, or concerns regarding their injury and/or treatment plan described above.     04/09/24 at 10:53 AM - Chance Maldonado MD  Scribe Attestation  By signing my name below, IMitchell, Scribcarly   attest that this documentation has been prepared under the direction and in the presence of Chance Maldonado MD.    Office: [phone]    This note was prepared using voice recognition software.   The details of this note are correct and have been reviewed, and corrected to the best of my ability.  Some grammatical errors may persist related to the Dragon software.

## 2024-04-20 DIAGNOSIS — M43.10 SPONDYLOLISTHESIS, UNSPECIFIED SPINAL REGION: ICD-10-CM

## 2024-04-22 RX ORDER — GABAPENTIN 100 MG/1
CAPSULE ORAL
Qty: 60 CAPSULE | Refills: 0 | Status: SHIPPED | OUTPATIENT
Start: 2024-04-22 | End: 2024-06-04

## 2024-05-23 DIAGNOSIS — S83.289A ACUTE LATERAL MENISCAL TEAR, INITIAL ENCOUNTER: ICD-10-CM

## 2024-05-23 DIAGNOSIS — M17.11 PRIMARY OSTEOARTHRITIS OF RIGHT KNEE: ICD-10-CM

## 2024-05-23 DIAGNOSIS — M23.91 INTERNAL DERANGEMENT OF RIGHT KNEE: ICD-10-CM

## 2024-06-03 DIAGNOSIS — M43.10 SPONDYLOLISTHESIS, UNSPECIFIED SPINAL REGION: ICD-10-CM

## 2024-06-04 RX ORDER — GABAPENTIN 100 MG/1
CAPSULE ORAL
Qty: 60 CAPSULE | Refills: 0 | Status: SHIPPED | OUTPATIENT
Start: 2024-06-04 | End: 2024-06-07 | Stop reason: SDUPTHER

## 2024-06-07 ENCOUNTER — OFFICE VISIT (OUTPATIENT)
Dept: PRIMARY CARE | Facility: CLINIC | Age: 64
End: 2024-06-07
Payer: MEDICARE

## 2024-06-07 VITALS
BODY MASS INDEX: 28.66 KG/M2 | SYSTOLIC BLOOD PRESSURE: 130 MMHG | OXYGEN SATURATION: 96 % | TEMPERATURE: 96.7 F | HEART RATE: 73 BPM | DIASTOLIC BLOOD PRESSURE: 82 MMHG | WEIGHT: 146 LBS | HEIGHT: 60 IN | RESPIRATION RATE: 16 BRPM

## 2024-06-07 DIAGNOSIS — E78.2 MIXED HYPERLIPIDEMIA: ICD-10-CM

## 2024-06-07 DIAGNOSIS — Z12.31 ENCOUNTER FOR SCREENING MAMMOGRAM FOR MALIGNANT NEOPLASM OF BREAST: Primary | ICD-10-CM

## 2024-06-07 DIAGNOSIS — M43.10 SPONDYLOLISTHESIS, UNSPECIFIED SPINAL REGION: ICD-10-CM

## 2024-06-07 DIAGNOSIS — I10 PRIMARY HYPERTENSION: ICD-10-CM

## 2024-06-07 DIAGNOSIS — R05.3 CHRONIC COUGH: ICD-10-CM

## 2024-06-07 PROCEDURE — 1036F TOBACCO NON-USER: CPT | Performed by: INTERNAL MEDICINE

## 2024-06-07 PROCEDURE — 90677 PCV20 VACCINE IM: CPT | Performed by: INTERNAL MEDICINE

## 2024-06-07 PROCEDURE — G0439 PPPS, SUBSEQ VISIT: HCPCS | Performed by: INTERNAL MEDICINE

## 2024-06-07 PROCEDURE — G0009 ADMIN PNEUMOCOCCAL VACCINE: HCPCS | Performed by: INTERNAL MEDICINE

## 2024-06-07 PROCEDURE — 1159F MED LIST DOCD IN RCRD: CPT | Performed by: INTERNAL MEDICINE

## 2024-06-07 PROCEDURE — 1170F FXNL STATUS ASSESSED: CPT | Performed by: INTERNAL MEDICINE

## 2024-06-07 PROCEDURE — 99397 PER PM REEVAL EST PAT 65+ YR: CPT | Performed by: INTERNAL MEDICINE

## 2024-06-07 PROCEDURE — 1160F RVW MEDS BY RX/DR IN RCRD: CPT | Performed by: INTERNAL MEDICINE

## 2024-06-07 PROCEDURE — 3074F SYST BP LT 130 MM HG: CPT | Performed by: INTERNAL MEDICINE

## 2024-06-07 PROCEDURE — 3078F DIAST BP <80 MM HG: CPT | Performed by: INTERNAL MEDICINE

## 2024-06-07 PROCEDURE — 99214 OFFICE O/P EST MOD 30 MIN: CPT | Performed by: INTERNAL MEDICINE

## 2024-06-07 RX ORDER — GABAPENTIN 100 MG/1
CAPSULE ORAL
Qty: 60 CAPSULE | Refills: 1 | Status: SHIPPED | OUTPATIENT
Start: 2024-06-07 | End: 2024-06-07 | Stop reason: SDUPTHER

## 2024-06-07 RX ORDER — TRIAMTERENE AND HYDROCHLOROTHIAZIDE 37.5; 25 MG/1; MG/1
1 CAPSULE ORAL DAILY
Qty: 90 CAPSULE | Refills: 3 | Status: SHIPPED | OUTPATIENT
Start: 2024-06-07

## 2024-06-07 RX ORDER — GABAPENTIN 100 MG/1
CAPSULE ORAL
Qty: 60 CAPSULE | Refills: 11 | Status: SHIPPED | OUTPATIENT
Start: 2024-06-07

## 2024-06-07 ASSESSMENT — ENCOUNTER SYMPTOMS
CONSTIPATION: 0
SLEEP DISTURBANCE: 0
NERVOUS/ANXIOUS: 1
ABDOMINAL PAIN: 0
DIARRHEA: 0
DYSPHORIC MOOD: 1
COUGH: 1

## 2024-06-07 ASSESSMENT — PATIENT HEALTH QUESTIONNAIRE - PHQ9
1. LITTLE INTEREST OR PLEASURE IN DOING THINGS: NOT AT ALL
SUM OF ALL RESPONSES TO PHQ9 QUESTIONS 1 AND 2: 0
2. FEELING DOWN, DEPRESSED OR HOPELESS: NOT AT ALL

## 2024-06-07 ASSESSMENT — ACTIVITIES OF DAILY LIVING (ADL)
TAKING_MEDICATION: INDEPENDENT
BATHING: INDEPENDENT
MANAGING_FINANCES: INDEPENDENT
DRESSING: INDEPENDENT
GROCERY_SHOPPING: INDEPENDENT
DOING_HOUSEWORK: INDEPENDENT

## 2024-06-07 NOTE — PROGRESS NOTES
"Patient here for a medicare wellness visit / physical and follow up    Subjective   Patient ID: Geri Christianson is a 64 y.o. female who presents for Follow-up, Medicare Annual Wellness Visit Subsequent, and Annual Exam.    The patient reports occasional low mood with anxiety, but feels that this is manageable without medications.  She does not take the alprazolam 0.25mg or Valium.    The patient has stopped taking the amlodipine 2.5mg once daily.  She has been completing physical therapy sessions where blood pressure is regularly measured, and notes that readings have been well controlled.  The blood pressure in the clinic today was 110/60 mmHg.  The patient continues to take triamterene-HCTZ 37.5-25mg once daily.    The patient is no longer taking clopidogrel 75mg once daily.    The patient continues to take gabapentin 100mg as two tablets at bed time, and finds this is working well.     The patient mentions an ongoing dry cough described as a \"deep barking\" that has increased following the last episode of acute bronchitis.      The patient is up to date with pap test screening tests.     The patient denies any vision changes.  She reports good sleep quality and takes regular magnesium supplementation.  The patient denies any abdominal pain or bowel problems.     The patient is grieving the recent loss of her brother-in-law.  She is adjusting as well as can be expected.     The patient plans to retire before the end of this year.        Review of Systems   Eyes:  Negative for visual disturbance.   Respiratory:  Positive for cough.    Gastrointestinal:  Negative for abdominal pain, constipation and diarrhea.   Psychiatric/Behavioral:  Positive for dysphoric mood. Negative for sleep disturbance. The patient is nervous/anxious.         Positive for grief reaction.       Objective   Physical Exam  Constitutional:       Appearance: Normal appearance.   Neck:      Vascular: No carotid bruit.   Cardiovascular:    "   Rate and Rhythm: Normal rate and regular rhythm.      Heart sounds: Normal heart sounds.   Pulmonary:      Effort: Pulmonary effort is normal.      Breath sounds: Normal breath sounds.   Abdominal:      General: Bowel sounds are normal.      Palpations: Abdomen is soft.      Tenderness: There is no abdominal tenderness.   Skin:     General: Skin is warm and dry.   Neurological:      General: No focal deficit present.      Mental Status: She is alert and oriented to person, place, and time. Mental status is at baseline.   Psychiatric:         Mood and Affect: Mood normal.         Behavior: Behavior normal.         Assessment/Plan   Problem List Items Addressed This Visit             ICD-10-CM    Hyperlipidemia E78.5    Relevant Orders    Lipid panel    Comprehensive metabolic panel    CBC and Auto Differential    Tsh With Reflex To Free T4 If Abnormal    Hypertension I10    Relevant Medications    triamterene-hydrochlorothiazid (Dyazide) 37.5-25 mg capsule     Other Visit Diagnoses         Codes    Encounter for screening mammogram for malignant neoplasm of breast    -  Primary Z12.31    Relevant Orders    BI mammo bilateral screening tomosynthesis    Spondylolisthesis, unspecified spinal region     M43.10    Relevant Medications    gabapentin (Neurontin) 100 mg capsule    Chronic cough     R05.3    Relevant Orders    CT chest wo IV contrast            Medicare Wellness Examination Done  -  Discussed healthy diet and regular exercise.    -  Physical exam overall unremarkable. Immunizations reviewed and updated accordingly. Healthy lifestyle choices discussed (tobacco avoidance, appropriate alcohol use, avoidance of illicit substances).   -  Patient is wearing seatbelt.   -  Screening lab work ordered as indicated.    -  Age appropriate screening tests reviewed with patient.       IMPRESSIONS/PLAN:        Chronic Cough   - Ordered CT Chest without Contrast.    HTN   - /60 in the office today, 130/82 on repeat.   Continue on triamterene-HCTZ 37.5-25mg QD.  Previously on amlodipine 2.5mg once daily.  Call the clinic with any concerns.      HLD   - Stable per 9/2023.  Continue on simvastatin 40mg QD.  ASCVD 5.8% 12/2023.  CT Cardiac Score 202.43 per 8/2023.     Depression  - Previously ordered referral for Adult Psychology with Laura MARTIN.  Call the clinic if symptoms persist or worsen.  Support provided.       Sleep Disturbance  - Encouraged patient to use previously prescribed alprazolam 0.25mg up to TID prn at night time to help.  Call the clinic if smptoms persist or worse.  Worsening stress levels- support provided.    Thyroid Nodule   - US thyroid 1/2021 showed stable, subcentimeter nodules of left gland. Stable per 11/2022 repeat.     Mediastinal Cyst  - CT Chest 11/2023 showed cystic lesion in the anterior mediastinum which is unchanged when  compared to the previous cardiac score CT of 08/11/2023. Repeat ordered as noted above for chronic cough.      Right Hemisphere SAH  - Occurred 9/15/2023, treated with DSA with pipeline shield flow diverter stent embolization.  Continue with clopidogrel 75mg once daily, and ASA 81mg once daily.  Following with  from [facility]rology at [facility], and  from Endovascular Neurosurgery.       Mammogram   - 11/25/2022 screening test found probably benign calcifications, and radiology recommended repeat in 6 months.  Benign per 6/2023 repeat.  Due in 11/2023 for bilateral screening.  Ordered.     Post herpetic neuralgia   - Refilled Gabapentin 100 mg as two tablets at bedtime as needed.     Seasonal Allergies  - Advised the patient to try Xyzal as directed on the packaging over the counter.  Call the clinic if symptoms persist or worsen.     Health Maintenance   - Routine labs ordered including CBC, CMP, and a lipid panel to be completed in the fasting state. Added TSH.  Last Mammogram 6/2023, ordered repeat for 2024.  Last colonoscopy performed 6/2021, due for repeat  2031.  Advised the patient to receive Shingrix series and TDAP booster separately through the pharmacy, and discussed adverse effects. Patient will receive Prevnar-20 in the clinic today.     Follow-up according to regular health maintenance, call sooner if needed.        Scribe Attestation  By signing my name below, ISadaf Scribe   attest that this documentation has been prepared under the direction and in the presence of Davon Salcedo DO.   Sadaf Barahona 06/07/24 11:34 AM

## 2024-06-14 ENCOUNTER — APPOINTMENT (OUTPATIENT)
Dept: RADIOLOGY | Facility: CLINIC | Age: 64
End: 2024-06-14
Payer: MEDICARE

## 2024-06-29 ENCOUNTER — LAB (OUTPATIENT)
Dept: LAB | Facility: LAB | Age: 64
End: 2024-06-29
Payer: MEDICARE

## 2024-06-29 ENCOUNTER — HOSPITAL ENCOUNTER (OUTPATIENT)
Dept: RADIOLOGY | Facility: CLINIC | Age: 64
Discharge: HOME | End: 2024-06-29
Payer: MEDICARE

## 2024-06-29 VITALS — BODY MASS INDEX: 28.65 KG/M2 | WEIGHT: 145.94 LBS | HEIGHT: 60 IN

## 2024-06-29 DIAGNOSIS — Z12.31 ENCOUNTER FOR SCREENING MAMMOGRAM FOR MALIGNANT NEOPLASM OF BREAST: ICD-10-CM

## 2024-06-29 DIAGNOSIS — E78.2 MIXED HYPERLIPIDEMIA: ICD-10-CM

## 2024-06-29 LAB
ALBUMIN SERPL BCP-MCNC: 4.3 G/DL (ref 3.4–5)
ALP SERPL-CCNC: 86 U/L (ref 33–136)
ALT SERPL W P-5'-P-CCNC: 12 U/L (ref 7–45)
ANION GAP SERPL CALC-SCNC: 13 MMOL/L (ref 10–20)
AST SERPL W P-5'-P-CCNC: 16 U/L (ref 9–39)
BASOPHILS # BLD AUTO: 0.05 X10*3/UL (ref 0–0.1)
BASOPHILS NFR BLD AUTO: 0.9 %
BILIRUB SERPL-MCNC: 0.3 MG/DL (ref 0–1.2)
BUN SERPL-MCNC: 24 MG/DL (ref 6–23)
CALCIUM SERPL-MCNC: 9.5 MG/DL (ref 8.6–10.6)
CHLORIDE SERPL-SCNC: 102 MMOL/L (ref 98–107)
CHOLEST SERPL-MCNC: 176 MG/DL (ref 0–199)
CHOLESTEROL/HDL RATIO: 3.5
CO2 SERPL-SCNC: 30 MMOL/L (ref 21–32)
CREAT SERPL-MCNC: 0.91 MG/DL (ref 0.5–1.05)
EGFRCR SERPLBLD CKD-EPI 2021: 70 ML/MIN/1.73M*2
EOSINOPHIL # BLD AUTO: 0.17 X10*3/UL (ref 0–0.7)
EOSINOPHIL NFR BLD AUTO: 3.2 %
ERYTHROCYTE [DISTWIDTH] IN BLOOD BY AUTOMATED COUNT: 13.9 % (ref 11.5–14.5)
GLUCOSE SERPL-MCNC: 92 MG/DL (ref 74–99)
HCT VFR BLD AUTO: 47.1 % (ref 36–46)
HDLC SERPL-MCNC: 51 MG/DL
HGB BLD-MCNC: 14.9 G/DL (ref 12–16)
IMM GRANULOCYTES # BLD AUTO: 0 X10*3/UL (ref 0–0.7)
IMM GRANULOCYTES NFR BLD AUTO: 0 % (ref 0–0.9)
LDLC SERPL CALC-MCNC: 109 MG/DL
LYMPHOCYTES # BLD AUTO: 2.46 X10*3/UL (ref 1.2–4.8)
LYMPHOCYTES NFR BLD AUTO: 46.6 %
MCH RBC QN AUTO: 29.4 PG (ref 26–34)
MCHC RBC AUTO-ENTMCNC: 31.6 G/DL (ref 32–36)
MCV RBC AUTO: 93 FL (ref 80–100)
MONOCYTES # BLD AUTO: 0.43 X10*3/UL (ref 0.1–1)
MONOCYTES NFR BLD AUTO: 8.1 %
NEUTROPHILS # BLD AUTO: 2.17 X10*3/UL (ref 1.2–7.7)
NEUTROPHILS NFR BLD AUTO: 41.2 %
NON HDL CHOLESTEROL: 125 MG/DL (ref 0–149)
NRBC BLD-RTO: 0 /100 WBCS (ref 0–0)
PLATELET # BLD AUTO: 319 X10*3/UL (ref 150–450)
POTASSIUM SERPL-SCNC: 4.3 MMOL/L (ref 3.5–5.3)
PROT SERPL-MCNC: 6.8 G/DL (ref 6.4–8.2)
RBC # BLD AUTO: 5.06 X10*6/UL (ref 4–5.2)
SODIUM SERPL-SCNC: 141 MMOL/L (ref 136–145)
TRIGL SERPL-MCNC: 80 MG/DL (ref 0–149)
TSH SERPL-ACNC: 0.98 MIU/L (ref 0.44–3.98)
VLDL: 16 MG/DL (ref 0–40)
WBC # BLD AUTO: 5.3 X10*3/UL (ref 4.4–11.3)

## 2024-06-29 PROCEDURE — 80061 LIPID PANEL: CPT

## 2024-06-29 PROCEDURE — 77063 BREAST TOMOSYNTHESIS BI: CPT | Performed by: RADIOLOGY

## 2024-06-29 PROCEDURE — 84443 ASSAY THYROID STIM HORMONE: CPT

## 2024-06-29 PROCEDURE — 77067 SCR MAMMO BI INCL CAD: CPT | Performed by: RADIOLOGY

## 2024-06-29 PROCEDURE — 36415 COLL VENOUS BLD VENIPUNCTURE: CPT

## 2024-06-29 PROCEDURE — 80053 COMPREHEN METABOLIC PANEL: CPT

## 2024-06-29 PROCEDURE — 85025 COMPLETE CBC W/AUTO DIFF WBC: CPT

## 2024-06-29 PROCEDURE — 77067 SCR MAMMO BI INCL CAD: CPT

## 2024-07-25 ENCOUNTER — OFFICE VISIT (OUTPATIENT)
Dept: NEUROLOGY | Age: 64
End: 2024-07-25
Payer: MEDICARE

## 2024-07-25 VITALS
SYSTOLIC BLOOD PRESSURE: 150 MMHG | WEIGHT: 145.8 LBS | BODY MASS INDEX: 28.62 KG/M2 | HEART RATE: 69 BPM | HEIGHT: 60 IN | DIASTOLIC BLOOD PRESSURE: 88 MMHG

## 2024-07-25 DIAGNOSIS — I60.01: Primary | ICD-10-CM

## 2024-07-25 DIAGNOSIS — I67.1 ANEURYSM OF INTRACRANIAL PORTION OF RIGHT INTERNAL CAROTID ARTERY: ICD-10-CM

## 2024-07-25 PROBLEM — I60.9 SUBARACHNOID HEMORRHAGE (HCC): Status: RESOLVED | Noted: 2023-09-15 | Resolved: 2024-07-25

## 2024-07-25 PROBLEM — G91.1 OBSTRUCTIVE HYDROCEPHALUS (HCC): Status: RESOLVED | Noted: 2023-09-15 | Resolved: 2024-07-25

## 2024-07-25 PROBLEM — I61.5 INTRAVENTRICULAR HEMORRHAGE (HCC): Status: RESOLVED | Noted: 2023-09-17 | Resolved: 2024-07-25

## 2024-07-25 PROCEDURE — 99215 OFFICE O/P EST HI 40 MIN: CPT | Performed by: PSYCHIATRY & NEUROLOGY

## 2024-07-25 PROCEDURE — 1123F ACP DISCUSS/DSCN MKR DOCD: CPT | Performed by: PSYCHIATRY & NEUROLOGY

## 2024-07-25 NOTE — PROGRESS NOTES
Endovascular Neurosurgery - [place] [id number]  [place] [address]  P: [phone]  F: [phone]      Dear Dr. Horn    HPI:     HPI    Charity Galvez is a 64 y.o. 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 Doing well after March 19, 2024 DSA with no aneurysm recurrence. Mood is improved but still working on short term memory.  Working part time 20 hrs a week.  She is able to travel later this fall.  She is also preparing for snf in November, 2024.    Still noticing trouble with short term memory but multitasking is improving.      Allergies   Allergen Reactions    Epinephrine Palpitations     Current Outpatient Medications   Medication Sig Dispense Refill    magnesium gluconate (MAGONATE) 500 MG tablet Take 100 mg by mouth daily      magnesium gluconate (MAGONATE) 500 MG tablet Take 200 mg by mouth nightly      NONFORMULARY       amLODIPine (NORVASC) 2.5 MG tablet Take 1 tablet by mouth daily      ALPRAZolam (XANAX) 0.25 MG tablet Take 1 tablet by mouth.      vitamin D3 (CHOLECALCIFEROL) 125 MCG (5000 UT) TABS tablet Take 1 tablet by mouth daily      aspirin 81 MG chewable tablet Take 1 tablet by mouth daily 30 tablet 3    clopidogrel (PLAVIX) 75 MG tablet Take 1 tablet by mouth daily (Patient not taking: Reported on 3/19/2024) 30 tablet 3    gabapentin (NEURONTIN) 100 MG capsule Take 2 capsules by mouth at bedtime.      simvastatin (ZOCOR) 40 MG tablet Take 1 tablet by mouth nightly      triamterene-hydroCHLOROthiazide (DYAZIDE) 37.5-25 MG per capsule Take 1 capsule by mouth every morning       No current facility-administered medications for this visit.     Past Medical History:   Diagnosis Date    Hyperlipidemia     Hypertension     Migraines       Past Surgical History:   Procedure Laterality Date    OTHER SURGICAL HISTORY  03/19/2024    diagnostic cerebral angiogram Dr Kristin JOHN

## 2024-08-16 ENCOUNTER — HOSPITAL ENCOUNTER (OUTPATIENT)
Dept: RADIOLOGY | Facility: CLINIC | Age: 64
Discharge: HOME | End: 2024-08-16
Payer: MEDICARE

## 2024-08-16 DIAGNOSIS — R05.3 CHRONIC COUGH: ICD-10-CM

## 2024-08-16 PROCEDURE — 71250 CT THORAX DX C-: CPT

## 2024-08-21 DIAGNOSIS — E04.1 THYROID NODULE: Primary | ICD-10-CM

## 2024-09-03 ENCOUNTER — TELEPHONE (OUTPATIENT)
Dept: NEUROLOGY | Age: 64
End: 2024-09-03

## 2024-09-03 NOTE — TELEPHONE ENCOUNTER
Pt called and stated that she is currently having sharp shooting pains in her head that last few a few seconds that do not resemble a migraine. States they have been off an on the last 2-3 weeks but she has had 3 episodes today and is getting concerned. States these pains are not like the ones she experienced after her angiogram earlier this year. Rejects going to the ER and wants a message sent to her NeuroEndo team. Please advise.

## 2024-09-03 NOTE — TELEPHONE ENCOUNTER
Pt  Bassam called in concerning his wife. I spoke with Bassam and let him know what Dr. Jimenez stated and pt needs to be evaluated in the ER.Pt  was concern if pt needs to go to [place] or go the newest ER. I sent a message to Dr. Jimenez in Epic chat and he responded saying. That pt needs to be evaluation in the nearest ER. The description provided sounds emergent and he cannot recommend them driving 2 and half hours to be evaluated. Pt  understood.

## 2024-09-06 NOTE — TELEPHONE ENCOUNTER
Patient called back, states she went to the Emergency room at [facility], they performed a Ct scan with showed: Head CT negative for acute intracranial abnormality..    I have called to have images pushed. Patient has appointment in July 2025.  Please advise if needed to be seen sooner

## 2024-09-13 ENCOUNTER — HOSPITAL ENCOUNTER (OUTPATIENT)
Dept: RADIOLOGY | Facility: CLINIC | Age: 64
Discharge: HOME | End: 2024-09-13
Payer: MEDICARE

## 2024-09-13 DIAGNOSIS — E04.1 THYROID NODULE: ICD-10-CM

## 2024-09-13 PROCEDURE — 76536 US EXAM OF HEAD AND NECK: CPT

## 2024-09-14 ENCOUNTER — PATIENT MESSAGE (OUTPATIENT)
Dept: PRIMARY CARE | Facility: CLINIC | Age: 64
End: 2024-09-14
Payer: MEDICARE

## 2024-11-01 DIAGNOSIS — E78.2 MIXED HYPERLIPIDEMIA: ICD-10-CM

## 2024-11-04 RX ORDER — SIMVASTATIN 40 MG/1
40 TABLET, FILM COATED ORAL EVERY EVENING
Qty: 90 TABLET | Refills: 2 | Status: SHIPPED | OUTPATIENT
Start: 2024-11-04

## 2024-12-06 ENCOUNTER — APPOINTMENT (OUTPATIENT)
Dept: PRIMARY CARE | Facility: CLINIC | Age: 64
End: 2024-12-06
Payer: MEDICARE

## 2024-12-06 VITALS
WEIGHT: 145 LBS | TEMPERATURE: 97.2 F | OXYGEN SATURATION: 98 % | DIASTOLIC BLOOD PRESSURE: 70 MMHG | HEART RATE: 90 BPM | SYSTOLIC BLOOD PRESSURE: 128 MMHG | BODY MASS INDEX: 28.1 KG/M2 | RESPIRATION RATE: 16 BRPM

## 2024-12-06 DIAGNOSIS — I60.01: ICD-10-CM

## 2024-12-06 DIAGNOSIS — I10 PRIMARY HYPERTENSION: Primary | ICD-10-CM

## 2024-12-06 DIAGNOSIS — M25.569 KNEE PAIN, UNSPECIFIED CHRONICITY, UNSPECIFIED LATERALITY: ICD-10-CM

## 2024-12-06 PROCEDURE — 99214 OFFICE O/P EST MOD 30 MIN: CPT | Performed by: INTERNAL MEDICINE

## 2024-12-06 PROCEDURE — 1160F RVW MEDS BY RX/DR IN RCRD: CPT | Performed by: INTERNAL MEDICINE

## 2024-12-06 PROCEDURE — 1159F MED LIST DOCD IN RCRD: CPT | Performed by: INTERNAL MEDICINE

## 2024-12-06 PROCEDURE — 1123F ACP DISCUSS/DSCN MKR DOCD: CPT | Performed by: INTERNAL MEDICINE

## 2024-12-06 PROCEDURE — 1158F ADVNC CARE PLAN TLK DOCD: CPT | Performed by: INTERNAL MEDICINE

## 2024-12-06 PROCEDURE — 3074F SYST BP LT 130 MM HG: CPT | Performed by: INTERNAL MEDICINE

## 2024-12-06 PROCEDURE — 3078F DIAST BP <80 MM HG: CPT | Performed by: INTERNAL MEDICINE

## 2024-12-06 ASSESSMENT — PATIENT HEALTH QUESTIONNAIRE - PHQ9
2. FEELING DOWN, DEPRESSED OR HOPELESS: NOT AT ALL
1. LITTLE INTEREST OR PLEASURE IN DOING THINGS: NOT AT ALL
SUM OF ALL RESPONSES TO PHQ9 QUESTIONS 1 AND 2: 0

## 2024-12-06 ASSESSMENT — ENCOUNTER SYMPTOMS: HEADACHES: 0

## 2024-12-06 NOTE — PROGRESS NOTES
Patient here for a 6 month follow up    Subjective   Patient ID: Geri Christianson is a 64 y.o. female who presents for Follow-up.  She is generally doing well today and has no complaints.    The patient continues to follow with  from Neurosurgery for a history of right hemisphere SAH, and states that the condition is stable.  She recalls an episode of acute headache on 9/3/2024 resulting in presentation to the ED.  She underwent CT Head which was negative for acute intracranial abnormality or acute intracranial hemorrhage.  The patient was discharged home the same day, and has not experienced any recurrence of headache since then.    The patient mentions an episode of transient lower extremity pain which seemed to resolve the following day upon waking.  She met with a Chiropractor at the time who noted leg length discrepancy.  She was able to see orthopedics as well where a steroid shot was administered.  This took a few weeks but was ultimately helpful.      Review of Systems   Neurological:  Negative for headaches.   All other systems reviewed and are negative.    Objective   Physical Exam  Constitutional:       Appearance: Normal appearance.   Neck:      Vascular: No carotid bruit.   Cardiovascular:      Rate and Rhythm: Normal rate and regular rhythm.      Heart sounds: Normal heart sounds.   Pulmonary:      Effort: Pulmonary effort is normal.      Breath sounds: Normal breath sounds.   Abdominal:      General: Bowel sounds are normal.      Palpations: Abdomen is soft.      Tenderness: There is no abdominal tenderness.   Skin:     General: Skin is warm and dry.   Neurological:      General: No focal deficit present.      Mental Status: She is alert and oriented to person, place, and time. Mental status is at baseline.   Psychiatric:         Mood and Affect: Mood normal.         Behavior: Behavior normal.         Assessment/Plan   Problem List Items Addressed This Visit     None      IMPRESSIONS/PLAN:       HTN   - /70 in the office today, 128/85 on repeat.  Continue on triamterene-HCTZ 37.5-25mg QD.  Previously on amlodipine 2.5mg once daily.  Call the clinic with any concerns.      HLD   - Stable per 9/2023.  Continue on simvastatin 40mg QD.  ASCVD 5.8% 12/2023.  CT Cardiac Score 202.43 per 8/2023.     Depression  - Previously ordered referral for Adult Psychology with Laura MARTIN.  Call the clinic if symptoms persist or worsen.  Support provided.       Sleep Disturbance  - Encouraged patient to use previously prescribed alprazolam 0.25mg up to TID prn at night time to help.  Call the clinic if smptoms persist or worse.  Worsening stress levels- support provided.     Right Hemisphere SAH  - Occurred 9/15/2023, treated with DSA with pipeline shield flow diverter stent embolization.  Continue with clopidogrel 75mg once daily, and ASA 81mg once daily.  Following with  from Neurology at [facility], and  from Endovascular Neurosurgery.      Seasonal Allergies  - Advised the patient to try Xyzal as directed on the packaging over the counter.  Call the clinic if symptoms persist or worsen.    Thyroid Nodule   - US thyroid 1/2021 showed stable, subcentimeter nodules of left gland. Stable per 11/2022 repeat.     Mediastinal Cyst  - CT Chest 11/2023 showed cystic lesion in the anterior mediastinum which is unchanged when  compared to the previous cardiac score CT of 08/11/2023. Repeat ordered as noted above for chronic cough.      Chronic Cough   - CT Chest without Contrast 8/2024 showed no acute intrathoracic abnormality.     Abnormal Mammogram   - 11/25/2022 screening test found probably benign calcifications, and radiology recommended repeat in 6 months.  Benign per subsequent repeats.  Last Mammogram 6/2024.       Post herpetic neuralgia   - Refilled Gabapentin 100 mg as two tablets at bedtime as needed.     Health Maintenance   - Routine labs 6/2024.  Last  Mammogram 6/2024.  Last colonoscopy performed 6/2021, due for repeat 2031.  Shingrix UTD.     Follow-up according to regular health maintenance, call sooner if needed.        Scribe Attestation  By signing my name below, I, Sadaf Barahona , Celiaibe   attest that this documentation has been prepared under the direction and in the presence of Davon Salcedo DO.   Sadaf Barahona 12/06/24 11:38 AM

## 2025-01-06 PROBLEM — Z86.19 HISTORY OF INFECTIOUS DISEASE: Status: ACTIVE | Noted: 2025-01-06

## 2025-01-06 PROBLEM — K76.9 LESION OF LIVER: Status: ACTIVE | Noted: 2025-01-06

## 2025-01-06 PROBLEM — N64.9 LESION OF BREAST: Status: ACTIVE | Noted: 2022-11-25

## 2025-01-06 PROBLEM — H52.4 PRESBYOPIA: Status: ACTIVE | Noted: 2025-01-06

## 2025-01-06 PROBLEM — R10.9 ABDOMINAL PAIN: Status: ACTIVE | Noted: 2025-01-06

## 2025-01-06 PROBLEM — H04.129 TEAR FILM INSUFFICIENCY: Status: ACTIVE | Noted: 2025-01-06

## 2025-01-06 PROBLEM — Z86.69 HISTORY OF MIGRAINE: Status: ACTIVE | Noted: 2025-01-06

## 2025-01-06 PROBLEM — J45.909 REACTIVE AIRWAY DISEASE (HHS-HCC): Status: ACTIVE | Noted: 2025-01-06

## 2025-01-06 PROBLEM — G47.01 INSOMNIA DUE TO MEDICAL CONDITION: Status: ACTIVE | Noted: 2025-01-06

## 2025-01-06 PROBLEM — Z86.69 HISTORY OF DISORDER OF GLOBE OF EYE: Status: ACTIVE | Noted: 2025-01-06

## 2025-01-06 PROBLEM — K56.609 SMALL BOWEL OBSTRUCTION (MULTI): Status: ACTIVE | Noted: 2025-01-06

## 2025-01-06 PROBLEM — F41.9 ANXIETY: Status: ACTIVE | Noted: 2025-01-06

## 2025-01-06 PROBLEM — M71.21 BAKER'S CYST OF KNEE, RIGHT: Status: ACTIVE | Noted: 2023-11-25

## 2025-01-06 PROBLEM — R92.8 ABNORMAL MAMMOGRAM: Status: ACTIVE | Noted: 2022-11-25

## 2025-01-06 PROBLEM — M62.838 MUSCLE SPASMS OF NECK: Status: ACTIVE | Noted: 2025-01-06

## 2025-01-06 PROBLEM — H91.90 HEARING LOSS: Status: ACTIVE | Noted: 2025-01-06

## 2025-01-06 PROBLEM — K58.9 IRRITABLE BOWEL SYNDROME: Status: ACTIVE | Noted: 2025-01-06

## 2025-01-07 ENCOUNTER — OFFICE VISIT (OUTPATIENT)
Dept: CARDIOLOGY | Facility: CLINIC | Age: 65
End: 2025-01-07
Payer: MEDICARE

## 2025-01-07 VITALS
BODY MASS INDEX: 28.47 KG/M2 | WEIGHT: 145 LBS | OXYGEN SATURATION: 96 % | DIASTOLIC BLOOD PRESSURE: 85 MMHG | SYSTOLIC BLOOD PRESSURE: 137 MMHG | HEIGHT: 60 IN | HEART RATE: 75 BPM

## 2025-01-07 DIAGNOSIS — I10 HYPERTENSION, UNSPECIFIED TYPE: ICD-10-CM

## 2025-01-07 DIAGNOSIS — E78.49 OTHER HYPERLIPIDEMIA: Primary | ICD-10-CM

## 2025-01-07 DIAGNOSIS — R93.1 ELEVATED CORONARY ARTERY CALCIUM SCORE: ICD-10-CM

## 2025-01-07 PROCEDURE — 99214 OFFICE O/P EST MOD 30 MIN: CPT | Performed by: INTERNAL MEDICINE

## 2025-01-07 PROCEDURE — 3075F SYST BP GE 130 - 139MM HG: CPT | Performed by: INTERNAL MEDICINE

## 2025-01-07 PROCEDURE — 93005 ELECTROCARDIOGRAM TRACING: CPT | Performed by: INTERNAL MEDICINE

## 2025-01-07 PROCEDURE — 3008F BODY MASS INDEX DOCD: CPT | Performed by: INTERNAL MEDICINE

## 2025-01-07 PROCEDURE — 3079F DIAST BP 80-89 MM HG: CPT | Performed by: INTERNAL MEDICINE

## 2025-01-07 PROCEDURE — 1160F RVW MEDS BY RX/DR IN RCRD: CPT | Performed by: INTERNAL MEDICINE

## 2025-01-07 PROCEDURE — 1159F MED LIST DOCD IN RCRD: CPT | Performed by: INTERNAL MEDICINE

## 2025-01-07 PROCEDURE — 1126F AMNT PAIN NOTED NONE PRSNT: CPT | Performed by: INTERNAL MEDICINE

## 2025-01-07 ASSESSMENT — ENCOUNTER SYMPTOMS
LOSS OF SENSATION IN FEET: 0
DEPRESSION: 0
OCCASIONAL FEELINGS OF UNSTEADINESS: 0

## 2025-01-07 ASSESSMENT — COLUMBIA-SUICIDE SEVERITY RATING SCALE - C-SSRS
2. HAVE YOU ACTUALLY HAD ANY THOUGHTS OF KILLING YOURSELF?: NO
1. IN THE PAST MONTH, HAVE YOU WISHED YOU WERE DEAD OR WISHED YOU COULD GO TO SLEEP AND NOT WAKE UP?: NO
6. HAVE YOU EVER DONE ANYTHING, STARTED TO DO ANYTHING, OR PREPARED TO DO ANYTHING TO END YOUR LIFE?: NO

## 2025-01-07 ASSESSMENT — PAIN SCALES - GENERAL: PAINLEVEL_OUTOF10: 0-NO PAIN

## 2025-01-07 NOTE — PROGRESS NOTES
CHIEF COMPLAINT: routine follow-up visit    HISTORY OF PRESENT ILLNESS:    PCP: Dr. Prabhjot Salcedo    Ms. Christianson is a 65 yo F with hx as listed below who returns to Cardiology Clinic for follow-up visit on elevated CAC; last seen by me in 1/2024. Feels well overall and denies CP, SOB, dizziness, LH, LE edema, or palpitations.  ECG shows NSR. Stressful year taking care of Mom who fractured femur. BP runs 120s at home. Getting cortisone injections for R knee. Has retired. Family hx significant for CAD as Dad passed from MI.    PAST MEDICAL/SURGICAL HISTORY:  -DLD  -HTN  -thyroid nodule  -tonsillectomy  -uterine myomectomy  -IBS  -bowel obstruction  -R ICA aneurysm with rupture; diffuse SAH s/p stent (2023); follows with Neurosurgery at [facility]    PRIOR CARDIAC TESTING:  -TTE (2023 at [facility]): EF 55-60%, mild MR  -CAC (2023): 202    Allergies   Allergen Reactions    Adhesive Tape-Silicones Unknown    Epinephrine Unknown    Lidocaine Unknown    Losartan-Hydrochlorothiazide Unknown     Current Outpatient Medications:     aspirin 81 mg chewable tablet, Chew 1 tablet (81 mg) once daily., Disp: , Rfl:     gabapentin (Neurontin) 100 mg capsule, TAKE TWO CAPSULES BY MOUTH ONCE DAILY AT BEDTIME, Disp: 60 capsule, Rfl: 11    simvastatin (Zocor) 40 mg tablet, TAKE ONE TABLET BY MOUTH in the EVENING, Disp: 90 tablet, Rfl: 2    triamterene-hydrochlorothiazid (Dyazide) 37.5-25 mg capsule, Take 1 capsule by mouth once daily., Disp: 90 capsule, Rfl: 3    Current Facility-Administered Medications:     betamethasone acet,sod phos (Celestone) injection 18 mg, 18 mg, intra-articular, Once, Chance Maldonado MD    lidocaine (Xylocaine) 10 mg/mL (1 %) injection 3 mL, 3 mL, infiltration, Once, Chance Maldonado MD    /85 (BP Location: Left arm, Patient Position: Sitting)   Pulse 75   Ht 1.524 m (5')   Wt 65.8 kg (145 lb)   SpO2 96%   BMI 28.32 kg/m²     PHYSICAL EXAM:  GENERAL: NAD  HEENT: no JVD  CV: RRR without m/r/g  PULM:  CTAB  EXT: non-edematous bilateral lower extremities     LABS  WBC (x10*3/uL)   Date Value   06/29/2024 5.3     Hemoglobin (g/dL)   Date Value   06/29/2024 14.9     Platelets (x10*3/uL)   Date Value   06/29/2024 319     Sodium (mmol/L)   Date Value   06/29/2024 141     Potassium (mmol/L)   Date Value   06/29/2024 4.3     Chloride (mmol/L)   Date Value   06/29/2024 102     Bicarbonate (mmol/L)   Date Value   06/29/2024 30     Urea Nitrogen (mg/dL)   Date Value   06/29/2024 24 (H)     Creatinine (mg/dL)   Date Value   06/29/2024 0.91     Calcium (mg/dL)   Date Value   06/29/2024 9.5     Total Protein (g/dL)   Date Value   06/29/2024 6.8     Bilirubin, Total (mg/dL)   Date Value   06/29/2024 0.3     Alkaline Phosphatase (U/L)   Date Value   06/29/2024 86     ALT (U/L)   Date Value   06/29/2024 12     AST (U/L)   Date Value   06/29/2024 16     Glucose (mg/dL)   Date Value   06/29/2024 92     Cholesterol (mg/dL)   Date Value   06/29/2024 176   08/11/2023 145   05/11/2022 162   05/14/2021 136     LDL Calculated (mg/dL)   Date Value   06/29/2024 109 (H)     HDL-Cholesterol (mg/dL)   Date Value   06/29/2024 51.0     HDL (mg/dL)   Date Value   08/11/2023 44.1   05/11/2022 52.0   05/14/2021 46.0     Triglycerides (mg/dL)   Date Value   06/29/2024 80   08/11/2023 75   05/11/2022 76   05/14/2021 69     Hemoglobin A1C (%)   Date Value   09/16/2023 5.5     Lab Results   Component Value Date    LDLF 86 08/11/2023     ASSESSMENT/PLAN: 63 yo F with hx of DLD, HTN, and R ICA aneurysm s/p rupture and stent placement (2023) here for elevated CAC and risk reduction. BP controlled on triamterene-hydrochlorothiazide 37.5/25 mg daily.  and TG 80 on simvastatin 40 mg daily. On ASA fas per Neurosurgery. RTC 1 year.

## 2025-01-09 LAB
ATRIAL RATE: 76 BPM
P AXIS: 33 DEGREES
P OFFSET: 207 MS
P ONSET: 164 MS
PR INTERVAL: 112 MS
Q ONSET: 220 MS
QRS COUNT: 12 BEATS
QRS DURATION: 84 MS
QT INTERVAL: 398 MS
QTC CALCULATION(BAZETT): 447 MS
QTC FREDERICIA: 430 MS
R AXIS: 66 DEGREES
T AXIS: 77 DEGREES
T OFFSET: 419 MS
VENTRICULAR RATE: 76 BPM

## 2025-01-16 ENCOUNTER — TELEPHONE (OUTPATIENT)
Dept: PRIMARY CARE | Facility: CLINIC | Age: 65
End: 2025-01-16
Payer: MEDICARE

## 2025-01-16 NOTE — TELEPHONE ENCOUNTER
Pt is leaving out of town tomorrow for a few weeks and needs to  refill for gabapentin - however its not due to be picked up until the 19th -  pharmacy said the office can call and ok an early  - please advise - BROOKE Rahman

## 2025-02-26 DIAGNOSIS — M43.10 SPONDYLOLISTHESIS, UNSPECIFIED SPINAL REGION: ICD-10-CM

## 2025-02-26 RX ORDER — GABAPENTIN 100 MG/1
CAPSULE ORAL
Qty: 60 CAPSULE | Refills: 0 | Status: SHIPPED | OUTPATIENT
Start: 2025-02-26

## 2025-03-30 DIAGNOSIS — M43.10 SPONDYLOLISTHESIS, UNSPECIFIED SPINAL REGION: ICD-10-CM

## 2025-03-31 RX ORDER — GABAPENTIN 100 MG/1
CAPSULE ORAL
Qty: 60 CAPSULE | Refills: 0 | Status: SHIPPED | OUTPATIENT
Start: 2025-03-31

## 2025-04-23 ENCOUNTER — PATIENT MESSAGE (OUTPATIENT)
Dept: PRIMARY CARE | Facility: CLINIC | Age: 65
End: 2025-04-23
Payer: MEDICARE

## 2025-04-23 DIAGNOSIS — J01.90 ACUTE NON-RECURRENT SINUSITIS, UNSPECIFIED LOCATION: Primary | ICD-10-CM

## 2025-04-23 RX ORDER — AMOXICILLIN AND CLAVULANATE POTASSIUM 875; 125 MG/1; MG/1
875 TABLET, FILM COATED ORAL 2 TIMES DAILY
Qty: 20 TABLET | Refills: 0 | Status: SHIPPED | OUTPATIENT
Start: 2025-04-23 | End: 2025-05-03

## 2025-04-25 RX ORDER — METHYLPREDNISOLONE 4 MG/1
TABLET ORAL
Qty: 21 TABLET | Refills: 0 | Status: SHIPPED | OUTPATIENT
Start: 2025-04-25 | End: 2025-05-01

## 2025-05-05 ENCOUNTER — TELEPHONE (OUTPATIENT)
Dept: NEUROLOGY | Age: 65
End: 2025-05-05

## 2025-05-05 DIAGNOSIS — I60.01: Primary | ICD-10-CM

## 2025-05-05 DIAGNOSIS — I67.1 ANEURYSM OF INTRACRANIAL PORTION OF RIGHT INTERNAL CAROTID ARTERY: ICD-10-CM

## 2025-05-05 NOTE — TELEPHONE ENCOUNTER
Patient called in and states she needs medication to be sent in to her pharmacy to help her relax for the MRA that she has schedule for 05/16

## 2025-05-06 RX ORDER — LORAZEPAM 1 MG/1
1 TABLET ORAL
Qty: 1 TABLET | Refills: 0 | Status: SHIPPED | OUTPATIENT
Start: 2025-05-06 | End: 2025-05-30

## 2025-05-07 DIAGNOSIS — M43.10 SPONDYLOLISTHESIS, UNSPECIFIED SPINAL REGION: ICD-10-CM

## 2025-05-07 RX ORDER — GABAPENTIN 100 MG/1
200 CAPSULE ORAL NIGHTLY
Qty: 60 CAPSULE | Refills: 0 | Status: SHIPPED | OUTPATIENT
Start: 2025-05-07

## 2025-05-16 ENCOUNTER — HOSPITAL ENCOUNTER (OUTPATIENT)
Dept: MRI IMAGING | Age: 65
Discharge: HOME | End: 2025-05-18
Attending: PSYCHIATRY & NEUROLOGY
Payer: MEDICARE

## 2025-05-16 DIAGNOSIS — I60.01: ICD-10-CM

## 2025-05-16 DIAGNOSIS — I67.1 ANEURYSM OF INTRACRANIAL PORTION OF RIGHT INTERNAL CAROTID ARTERY: ICD-10-CM

## 2025-05-16 PROCEDURE — 70544 MR ANGIOGRAPHY HEAD W/O DYE: CPT

## 2025-06-06 ENCOUNTER — PATIENT MESSAGE (OUTPATIENT)
Dept: PRIMARY CARE | Facility: CLINIC | Age: 65
End: 2025-06-06
Payer: MEDICARE

## 2025-06-06 DIAGNOSIS — M43.10 SPONDYLOLISTHESIS, UNSPECIFIED SPINAL REGION: ICD-10-CM

## 2025-06-07 RX ORDER — GABAPENTIN 100 MG/1
200 CAPSULE ORAL NIGHTLY
Qty: 180 CAPSULE | Refills: 1 | Status: SHIPPED | OUTPATIENT
Start: 2025-06-07

## 2025-06-23 ENCOUNTER — APPOINTMENT (OUTPATIENT)
Dept: PRIMARY CARE | Facility: CLINIC | Age: 65
End: 2025-06-23
Payer: MEDICARE

## 2025-06-23 VITALS
BODY MASS INDEX: 28.35 KG/M2 | SYSTOLIC BLOOD PRESSURE: 140 MMHG | HEART RATE: 63 BPM | WEIGHT: 144.4 LBS | DIASTOLIC BLOOD PRESSURE: 98 MMHG | TEMPERATURE: 97.7 F | HEIGHT: 60 IN | OXYGEN SATURATION: 96 %

## 2025-06-23 DIAGNOSIS — Z12.31 ENCOUNTER FOR SCREENING MAMMOGRAM FOR MALIGNANT NEOPLASM OF BREAST: ICD-10-CM

## 2025-06-23 DIAGNOSIS — Z00.00 MEDICARE ANNUAL WELLNESS VISIT, SUBSEQUENT: Primary | ICD-10-CM

## 2025-06-23 DIAGNOSIS — I10 PRIMARY HYPERTENSION: ICD-10-CM

## 2025-06-23 DIAGNOSIS — Q34.1 MEDIASTINAL CYST: ICD-10-CM

## 2025-06-23 DIAGNOSIS — Z11.59 NEED FOR HEPATITIS C SCREENING TEST: ICD-10-CM

## 2025-06-23 DIAGNOSIS — E78.2 MIXED HYPERLIPIDEMIA: ICD-10-CM

## 2025-06-23 PROBLEM — R10.9 ABDOMINAL PAIN: Status: RESOLVED | Noted: 2025-01-06 | Resolved: 2025-06-23

## 2025-06-23 PROBLEM — K76.9 LESION OF LIVER: Status: RESOLVED | Noted: 2025-01-06 | Resolved: 2025-06-23

## 2025-06-23 PROBLEM — I61.5 INTRAVENTRICULAR HEMORRHAGE (MULTI): Status: RESOLVED | Noted: 2023-09-17 | Resolved: 2025-06-23

## 2025-06-23 PROBLEM — I60.9 SUBARACHNOID HEMORRHAGE (MULTI): Status: RESOLVED | Noted: 2023-09-15 | Resolved: 2025-06-23

## 2025-06-23 PROBLEM — M71.21 BAKER'S CYST OF KNEE, RIGHT: Status: RESOLVED | Noted: 2023-11-25 | Resolved: 2025-06-23

## 2025-06-23 PROBLEM — R92.8 ABNORMAL MAMMOGRAM: Status: RESOLVED | Noted: 2022-11-25 | Resolved: 2025-06-23

## 2025-06-23 PROBLEM — I60.01: Status: RESOLVED | Noted: 2023-09-16 | Resolved: 2025-06-23

## 2025-06-23 PROBLEM — M62.838 MUSCLE SPASMS OF NECK: Status: RESOLVED | Noted: 2025-01-06 | Resolved: 2025-06-23

## 2025-06-23 PROBLEM — N64.9 LESION OF BREAST: Status: RESOLVED | Noted: 2022-11-25 | Resolved: 2025-06-23

## 2025-06-23 PROBLEM — Z86.19 HISTORY OF INFECTIOUS DISEASE: Status: RESOLVED | Noted: 2025-01-06 | Resolved: 2025-06-23

## 2025-06-23 PROBLEM — Z86.69 HISTORY OF DISORDER OF GLOBE OF EYE: Status: RESOLVED | Noted: 2025-01-06 | Resolved: 2025-06-23

## 2025-06-23 PROBLEM — K58.9 IRRITABLE BOWEL SYNDROME: Status: RESOLVED | Noted: 2025-01-06 | Resolved: 2025-06-23

## 2025-06-23 PROBLEM — H91.90 HEARING LOSS: Status: RESOLVED | Noted: 2025-01-06 | Resolved: 2025-06-23

## 2025-06-23 PROBLEM — H04.129 TEAR FILM INSUFFICIENCY: Status: RESOLVED | Noted: 2025-01-06 | Resolved: 2025-06-23

## 2025-06-23 PROBLEM — K56.609 SMALL BOWEL OBSTRUCTION (MULTI): Status: RESOLVED | Noted: 2025-01-06 | Resolved: 2025-06-23

## 2025-06-23 PROCEDURE — 99214 OFFICE O/P EST MOD 30 MIN: CPT | Performed by: INTERNAL MEDICINE

## 2025-06-23 PROCEDURE — 1158F ADVNC CARE PLAN TLK DOCD: CPT | Performed by: INTERNAL MEDICINE

## 2025-06-23 PROCEDURE — G0439 PPPS, SUBSEQ VISIT: HCPCS | Performed by: INTERNAL MEDICINE

## 2025-06-23 PROCEDURE — 3080F DIAST BP >= 90 MM HG: CPT | Performed by: INTERNAL MEDICINE

## 2025-06-23 PROCEDURE — 1170F FXNL STATUS ASSESSED: CPT | Performed by: INTERNAL MEDICINE

## 2025-06-23 PROCEDURE — 3077F SYST BP >= 140 MM HG: CPT | Performed by: INTERNAL MEDICINE

## 2025-06-23 PROCEDURE — G2211 COMPLEX E/M VISIT ADD ON: HCPCS | Performed by: INTERNAL MEDICINE

## 2025-06-23 PROCEDURE — 1123F ACP DISCUSS/DSCN MKR DOCD: CPT | Performed by: INTERNAL MEDICINE

## 2025-06-23 PROCEDURE — 1159F MED LIST DOCD IN RCRD: CPT | Performed by: INTERNAL MEDICINE

## 2025-06-23 PROCEDURE — 99397 PER PM REEVAL EST PAT 65+ YR: CPT | Performed by: INTERNAL MEDICINE

## 2025-06-23 PROCEDURE — 1036F TOBACCO NON-USER: CPT | Performed by: INTERNAL MEDICINE

## 2025-06-23 PROCEDURE — 1160F RVW MEDS BY RX/DR IN RCRD: CPT | Performed by: INTERNAL MEDICINE

## 2025-06-23 PROCEDURE — 3008F BODY MASS INDEX DOCD: CPT | Performed by: INTERNAL MEDICINE

## 2025-06-23 RX ORDER — CHOLECALCIFEROL (VITAMIN D3) 25 MCG
25 TABLET ORAL DAILY
COMMUNITY

## 2025-06-23 ASSESSMENT — ACTIVITIES OF DAILY LIVING (ADL)
DOING_HOUSEWORK: INDEPENDENT
DRESSING: INDEPENDENT
BATHING: INDEPENDENT
GROCERY_SHOPPING: INDEPENDENT
TAKING_MEDICATION: INDEPENDENT
MANAGING_FINANCES: INDEPENDENT

## 2025-06-23 ASSESSMENT — PATIENT HEALTH QUESTIONNAIRE - PHQ9
1. LITTLE INTEREST OR PLEASURE IN DOING THINGS: NOT AT ALL
2. FEELING DOWN, DEPRESSED OR HOPELESS: NOT AT ALL
SUM OF ALL RESPONSES TO PHQ9 QUESTIONS 1 AND 2: 0

## 2025-06-23 ASSESSMENT — ENCOUNTER SYMPTOMS
NERVOUS/ANXIOUS: 1
CONSTIPATION: 0
SLEEP DISTURBANCE: 1
DIARRHEA: 0
ABDOMINAL PAIN: 0

## 2025-06-23 NOTE — PROGRESS NOTES
Patient is here for the medicare, follow up, and physical.    Subjective   Patient ID: Geri Christianson is a 65 y.o. female who presents for Medicare Annual Wellness Visit Subsequent, Follow-up, and Annual Exam.    The patient reports occasional anxiety episodes related to driving.  She endorses mild sleep disturbance related to over thinking, particularly at the beginning of the evening.  The patient finds that reading is helping.    The patient mentions mild hearing impairment confirmed via Audiometry a few years prior.  She states that symptoms are tolerable to date.    The patient denies any vision changes and completes regular eye exams.    The patient denies any abdominal pain or bowel problems.     The patient's maternal grandmother has a history of colon cancer.  The patient completed the last colonoscopy in 6/2021 and will be due for repeat in 6/2026.      Review of Systems   HENT:  Positive for hearing loss.    Eyes:  Negative for visual disturbance.   Gastrointestinal:  Negative for abdominal pain, constipation and diarrhea.   Psychiatric/Behavioral:  Positive for sleep disturbance. The patient is nervous/anxious.      Objective   Physical Exam  Constitutional:       Appearance: Normal appearance.   Neck:      Vascular: No carotid bruit.   Cardiovascular:      Rate and Rhythm: Normal rate and regular rhythm.      Heart sounds: Normal heart sounds.   Pulmonary:      Effort: Pulmonary effort is normal.      Breath sounds: Normal breath sounds.   Abdominal:      General: Bowel sounds are normal.      Palpations: Abdomen is soft.      Tenderness: There is no abdominal tenderness.   Skin:     General: Skin is warm and dry.   Neurological:      General: No focal deficit present.      Mental Status: She is alert and oriented to person, place, and time. Mental status is at baseline.   Psychiatric:         Mood and Affect: Mood normal.         Behavior: Behavior normal.         Assessment/Plan   Problem  List Items Addressed This Visit           ICD-10-CM    Hyperlipidemia - Primary E78.5    Relevant Orders    Lipid panel    CBC and Auto Differential    Comprehensive metabolic panel    Tsh With Reflex To Free T4 If Abnormal    Hypertension I10    Relevant Orders    Lipid panel    CBC and Auto Differential    Comprehensive metabolic panel    Tsh With Reflex To Free T4 If Abnormal     Other Visit Diagnoses         Codes      Encounter for screening mammogram for malignant neoplasm of breast     Z12.31    Relevant Orders    BI mammo bilateral screening tomosynthesis      Mediastinal cyst     Q34.1    Relevant Orders    CT chest wo IV contrast      Need for hepatitis C screening test     Z11.59    Relevant Orders    Hepatitis C antibody            Medicare Wellness Examination Done  -  Discussed healthy diet and regular exercise.    -  Physical exam overall unremarkable. Immunizations reviewed and updated accordingly. Healthy lifestyle choices discussed (tobacco avoidance, appropriate alcohol use, avoidance of illicit substances).   -  Patient is wearing seatbelt.   -  Screening lab work ordered as indicated.    -  Age appropriate screening tests reviewed with patient.       IMPRESSIONS/PLAN:       Mediastinal Cyst  - CT Chest 11/2023 showed cystic lesion in the anterior mediastinum which is unchanged when  compared to the previous cardiac score CT of 08/11/2023. Ordered repeat CT Chest for 2025.    Hepatitis C Screening  - Ordered Hepatitis C antibody.    HTN   - /98 in the clinic today, 150/90 on repeat.  Continue on triamterene-HCTZ 37.5-25mg QD.  Previously on amlodipine 2.5mg once daily.  Advised patient to monitor blood pressure at home.  Call the clinic if persistently elevated above 140/90, and will consider additional management.     HLD   - Stable per 6/2024.  Continue on simvastatin 40mg QD.  ASCVD 10.2% 16/2025.  CT Cardiac Score 202.43 per 8/2023.      Sleep Disturbance  -Worsening stress levels-  "support provided.     Right Hemisphere SAH  - Occurred 9/15/2023, treated with DSA with pipeline shield flow diverter stent embolization.  Continue with clopidogrel 75mg once daily, and ASA 81mg once daily.  Following with  from Neurology at [facility], and  from [facility].       Seasonal Allergies  - Advised the patient to try Xyzal as directed on the packaging over the counter.  Call the clinic if symptoms persist or worsen.     Thyroid Nodule   - US thyroid 1/2021 showed stable, subcentimeter nodules of left gland. Stable per 11/2022 repeat.    Chronic Cough   - CT Chest without Contrast 8/2024 showed no acute intrathoracic abnormality.     Abnormal Mammogram   - 11/25/2022 screening test found probably benign calcifications, and radiology recommended repeat in 6 months.  Benign per subsequent repeats.  Last Mammogram 6/2024.   Ordered.     Post herpetic neuralgia   - Refilled Gabapentin 100 mg as two tablets at bedtime as needed.     Health Maintenance   - Routine labs ordered including CBC, CMP, and a lipid panel to be completed in the fasting state. Added TSH.  Last Mammogram 6/2024, ordered repeat for 2025.  Last colonoscopy performed 6/2021, due for repeat 6/2026.  Shingrix UTD.  Last Tdap 8/22/2012.  Encouraged her to get this through her pharmacy.     Follow-up according to regular health maintenance, call sooner if needed.        \"I, Dr. Salcedo, personally performed the services described in the documentation as scribed by Sadaf Barahona in my presence, and confirm it is both accurate and complete.   Scribe Attestation     Scribe Attestation  By signing my name below, I, Sadaf Barahona, Scribe   attest that this documentation has been prepared under the direction and in the presence of Davon Salcedo DO.   Sadaf Barahona 06/23/25 11:31 AM   "

## 2025-06-29 DIAGNOSIS — I10 PRIMARY HYPERTENSION: ICD-10-CM

## 2025-06-30 RX ORDER — TRIAMTERENE AND HYDROCHLOROTHIAZIDE 37.5; 25 MG/1; MG/1
1 CAPSULE ORAL DAILY
Qty: 30 CAPSULE | Refills: 0 | Status: SHIPPED | OUTPATIENT
Start: 2025-06-30

## 2025-07-01 LAB
ALBUMIN SERPL-MCNC: 4.3 G/DL (ref 3.6–5.1)
ALP SERPL-CCNC: 74 U/L (ref 37–153)
ALT SERPL-CCNC: 14 U/L (ref 6–29)
ANION GAP SERPL CALCULATED.4IONS-SCNC: 9 MMOL/L (CALC) (ref 7–17)
AST SERPL-CCNC: 18 U/L (ref 10–35)
BASOPHILS # BLD AUTO: 58 CELLS/UL (ref 0–200)
BASOPHILS NFR BLD AUTO: 1 %
BILIRUB SERPL-MCNC: 0.5 MG/DL (ref 0.2–1.2)
BUN SERPL-MCNC: 22 MG/DL (ref 7–25)
CALCIUM SERPL-MCNC: 9.6 MG/DL (ref 8.6–10.4)
CHLORIDE SERPL-SCNC: 100 MMOL/L (ref 98–110)
CHOLEST SERPL-MCNC: 184 MG/DL
CHOLEST/HDLC SERPL: 3.3 (CALC)
CO2 SERPL-SCNC: 30 MMOL/L (ref 20–32)
CREAT SERPL-MCNC: 0.9 MG/DL (ref 0.5–1.05)
EGFRCR SERPLBLD CKD-EPI 2021: 70 ML/MIN/1.73M2
EOSINOPHIL # BLD AUTO: 122 CELLS/UL (ref 15–500)
EOSINOPHIL NFR BLD AUTO: 2.1 %
ERYTHROCYTE [DISTWIDTH] IN BLOOD BY AUTOMATED COUNT: 13.2 % (ref 11–15)
GLUCOSE SERPL-MCNC: 94 MG/DL (ref 65–99)
HCT VFR BLD AUTO: 46.6 % (ref 35–45)
HCV AB SERPL QL IA: NORMAL
HDLC SERPL-MCNC: 56 MG/DL
HGB BLD-MCNC: 15.2 G/DL (ref 11.7–15.5)
LDLC SERPL CALC-MCNC: 108 MG/DL (CALC)
LYMPHOCYTES # BLD AUTO: 2552 CELLS/UL (ref 850–3900)
LYMPHOCYTES NFR BLD AUTO: 44 %
MCH RBC QN AUTO: 30.7 PG (ref 27–33)
MCHC RBC AUTO-ENTMCNC: 32.6 G/DL (ref 32–36)
MCV RBC AUTO: 94.1 FL (ref 80–100)
MONOCYTES # BLD AUTO: 615 CELLS/UL (ref 200–950)
MONOCYTES NFR BLD AUTO: 10.6 %
NEUTROPHILS # BLD AUTO: 2453 CELLS/UL (ref 1500–7800)
NEUTROPHILS NFR BLD AUTO: 42.3 %
NONHDLC SERPL-MCNC: 128 MG/DL (CALC)
PLATELET # BLD AUTO: 316 THOUSAND/UL (ref 140–400)
PMV BLD REES-ECKER: 10.6 FL (ref 7.5–12.5)
POTASSIUM SERPL-SCNC: 3.7 MMOL/L (ref 3.5–5.3)
PROT SERPL-MCNC: 6.6 G/DL (ref 6.1–8.1)
RBC # BLD AUTO: 4.95 MILLION/UL (ref 3.8–5.1)
SODIUM SERPL-SCNC: 139 MMOL/L (ref 135–146)
TRIGL SERPL-MCNC: 100 MG/DL
TSH SERPL-ACNC: 1.34 MIU/L (ref 0.4–4.5)
WBC # BLD AUTO: 5.8 THOUSAND/UL (ref 3.8–10.8)

## 2025-07-28 DIAGNOSIS — E78.2 MIXED HYPERLIPIDEMIA: ICD-10-CM

## 2025-07-28 RX ORDER — SIMVASTATIN 40 MG/1
40 TABLET, FILM COATED ORAL NIGHTLY
Qty: 90 TABLET | Refills: 3 | Status: SHIPPED | OUTPATIENT
Start: 2025-07-28

## 2025-07-29 DIAGNOSIS — I10 PRIMARY HYPERTENSION: ICD-10-CM

## 2025-07-29 RX ORDER — TRIAMTERENE AND HYDROCHLOROTHIAZIDE 37.5; 25 MG/1; MG/1
1 CAPSULE ORAL DAILY
Qty: 90 CAPSULE | Refills: 3 | Status: SHIPPED | OUTPATIENT
Start: 2025-07-29

## 2025-07-30 ENCOUNTER — OFFICE VISIT (OUTPATIENT)
Dept: NEUROLOGY | Age: 65
End: 2025-07-30
Payer: MEDICARE

## 2025-07-30 VITALS
SYSTOLIC BLOOD PRESSURE: 132 MMHG | HEIGHT: 60 IN | DIASTOLIC BLOOD PRESSURE: 81 MMHG | WEIGHT: 142.8 LBS | HEART RATE: 79 BPM | BODY MASS INDEX: 28.03 KG/M2

## 2025-07-30 DIAGNOSIS — I67.1 ANEURYSM OF INTRACRANIAL PORTION OF RIGHT INTERNAL CAROTID ARTERY: Primary | ICD-10-CM

## 2025-07-30 PROCEDURE — 99215 OFFICE O/P EST HI 40 MIN: CPT | Performed by: PSYCHIATRY & NEUROLOGY

## 2025-07-30 PROCEDURE — 1123F ACP DISCUSS/DSCN MKR DOCD: CPT | Performed by: PSYCHIATRY & NEUROLOGY

## 2025-07-30 PROCEDURE — 1159F MED LIST DOCD IN RCRD: CPT | Performed by: PSYCHIATRY & NEUROLOGY

## 2025-07-30 RX ORDER — LATANOPROST 50 UG/ML
1 SOLUTION/ DROPS OPHTHALMIC NIGHTLY
COMMUNITY
Start: 2025-06-23

## 2025-07-30 RX ORDER — VIT B COMP NO.3/FOLIC/C/BIOTIN 1 MG-60 MG
1 TABLET ORAL
COMMUNITY

## 2025-07-30 NOTE — PROGRESS NOTES
[facility] - [place] [address]  [place] [address]  P: [phone]  F: [phone]      Dear Dr. Albert Horn    HPI:     History of Present Illness  Charity Galvez is a 65 y.o. 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 Doing well after March 19, 2024 DSA with no aneurysm recurrence. She presents for a 2-year follow-up since the ruptured right ICA blister aneurysm. And a a left cavernous ica outpouching    She reports no new or updated health issues. She has been supplementing with vitamin D and magnesium. She has not been using Xanax. She recently resumed driving and even managed a long-distance drive to [place] in June 2025. She recalls that during her last visit in March 2025, she was informed of a finding on the left side that required monitoring.    She mentions experiencing mood fluctuations, characterized by constant worry and a quick temper.    MEDICATIONS  CURRENT MEDS:  Vitamin D  Magnesium  PREVIOUS MEDS:  Xanax  Reason for Discontinuation: Addictive    .    Allergies   Allergen Reactions    Epinephrine Palpitations    Adhesive Tape      Other Reaction(s): Unknown    Losartan Potassium-Hctz      Other Reaction(s): Unknown    Procaine Nausea Only and Other (See Comments)     Other Reaction(s): Other: See Comments    With epinephrine  Heart racing    Lidocaine Palpitations     Other Reaction(s): Unknown, Unknown     Current Outpatient Medications   Medication Sig Dispense Refill    B Complex-C-Folic Acid (VAMSI-BRENNAN RX) 1 MG TABS Take 1 tablet by mouth daily (with breakfast)      latanoprost (XALATAN) 0.005 % ophthalmic solution Apply 1 drop to eye nightly      magnesium gluconate (MAGONATE) 500 MG tablet Take 100 mg by mouth daily      magnesium gluconate (MAGONATE) 500 MG tablet Take 200 mg by mouth nightly      NONFORMULARY       vitamin D3 (CHOLECALCIFEROL) 125 MCG (5000

## 2025-07-31 ENCOUNTER — HOSPITAL ENCOUNTER (OUTPATIENT)
Dept: RADIOLOGY | Facility: CLINIC | Age: 65
Discharge: HOME | End: 2025-07-31
Payer: MEDICARE

## 2025-07-31 ENCOUNTER — APPOINTMENT (OUTPATIENT)
Dept: RADIOLOGY | Facility: CLINIC | Age: 65
End: 2025-07-31
Payer: MEDICARE

## 2025-07-31 VITALS — WEIGHT: 142 LBS | BODY MASS INDEX: 27.88 KG/M2 | HEIGHT: 60 IN

## 2025-07-31 DIAGNOSIS — Q34.1 MEDIASTINAL CYST: ICD-10-CM

## 2025-07-31 DIAGNOSIS — Z12.31 ENCOUNTER FOR SCREENING MAMMOGRAM FOR MALIGNANT NEOPLASM OF BREAST: ICD-10-CM

## 2025-07-31 PROCEDURE — 77063 BREAST TOMOSYNTHESIS BI: CPT

## 2025-07-31 PROCEDURE — 71250 CT THORAX DX C-: CPT | Performed by: RADIOLOGY

## 2025-07-31 PROCEDURE — 71250 CT THORAX DX C-: CPT

## 2025-08-01 ASSESSMENT — ENCOUNTER SYMPTOMS
VOMITING: 0
PHOTOPHOBIA: 0
VOICE CHANGE: 0
COUGH: 0
SHORTNESS OF BREATH: 0
NAUSEA: 0
COLOR CHANGE: 0

## 2025-12-17 ENCOUNTER — APPOINTMENT (OUTPATIENT)
Dept: PRIMARY CARE | Facility: CLINIC | Age: 65
End: 2025-12-17
Payer: MEDICARE

## 2026-01-12 ENCOUNTER — APPOINTMENT (OUTPATIENT)
Dept: CARDIOLOGY | Facility: CLINIC | Age: 66
End: 2026-01-12
Payer: MEDICARE

## 2026-01-19 ENCOUNTER — APPOINTMENT (OUTPATIENT)
Dept: CARDIOLOGY | Facility: CLINIC | Age: 66
End: 2026-01-19
Payer: MEDICARE

## 2026-06-24 ENCOUNTER — APPOINTMENT (OUTPATIENT)
Dept: PRIMARY CARE | Facility: CLINIC | Age: 66
End: 2026-06-24
Payer: MEDICARE